# Patient Record
Sex: MALE | Race: WHITE | NOT HISPANIC OR LATINO | Employment: OTHER | URBAN - METROPOLITAN AREA
[De-identification: names, ages, dates, MRNs, and addresses within clinical notes are randomized per-mention and may not be internally consistent; named-entity substitution may affect disease eponyms.]

---

## 2023-04-14 ENCOUNTER — TELEPHONE (OUTPATIENT)
Dept: PRIMARY CARE | Facility: CLINIC | Age: 73
End: 2023-04-14
Payer: MEDICARE

## 2023-04-14 DIAGNOSIS — J43.2 CENTRILOBULAR EMPHYSEMA (MULTI): Primary | ICD-10-CM

## 2023-04-14 LAB
ALANINE AMINOTRANSFERASE (SGPT) (U/L) IN SER/PLAS: 12 U/L (ref 10–52)
ALBUMIN (G/DL) IN SER/PLAS: 3.9 G/DL (ref 3.4–5)
ALKALINE PHOSPHATASE (U/L) IN SER/PLAS: 66 U/L (ref 33–136)
ANION GAP IN SER/PLAS: 19 MMOL/L (ref 10–20)
ASPARTATE AMINOTRANSFERASE (SGOT) (U/L) IN SER/PLAS: 9 U/L (ref 9–39)
BASOPHILS (10*3/UL) IN BLOOD BY AUTOMATED COUNT: 0.05 X10E9/L (ref 0–0.1)
BASOPHILS/100 LEUKOCYTES IN BLOOD BY AUTOMATED COUNT: 0.6 % (ref 0–2)
BILIRUBIN TOTAL (MG/DL) IN SER/PLAS: 0.5 MG/DL (ref 0–1.2)
CALCIDIOL (25 OH VITAMIN D3) (NG/ML) IN SER/PLAS: 51 NG/ML
CALCIUM (MG/DL) IN SER/PLAS: 9 MG/DL (ref 8.6–10.3)
CARBON DIOXIDE, TOTAL (MMOL/L) IN SER/PLAS: 27 MMOL/L (ref 21–32)
CHLORIDE (MMOL/L) IN SER/PLAS: 104 MMOL/L (ref 98–107)
CHOLESTEROL (MG/DL) IN SER/PLAS: 112 MG/DL (ref 0–199)
CHOLESTEROL IN HDL (MG/DL) IN SER/PLAS: 26.6 MG/DL
CHOLESTEROL/HDL RATIO: 4.2
COBALAMIN (VITAMIN B12) (PG/ML) IN SER/PLAS: 1202 PG/ML (ref 211–911)
CREATININE (MG/DL) IN SER/PLAS: 12.03 MG/DL (ref 0.5–1.3)
EOSINOPHILS (10*3/UL) IN BLOOD BY AUTOMATED COUNT: 1.04 X10E9/L (ref 0–0.4)
EOSINOPHILS/100 LEUKOCYTES IN BLOOD BY AUTOMATED COUNT: 11.9 % (ref 0–6)
ERYTHROCYTE DISTRIBUTION WIDTH (RATIO) BY AUTOMATED COUNT: 16.7 % (ref 11.5–14.5)
ERYTHROCYTE MEAN CORPUSCULAR HEMOGLOBIN CONCENTRATION (G/DL) BY AUTOMATED: 30 G/DL (ref 32–36)
ERYTHROCYTE MEAN CORPUSCULAR VOLUME (FL) BY AUTOMATED COUNT: 102 FL (ref 80–100)
ERYTHROCYTES (10*6/UL) IN BLOOD BY AUTOMATED COUNT: 3.6 X10E12/L (ref 4.5–5.9)
GFR MALE: 4 ML/MIN/1.73M2
GLUCOSE (MG/DL) IN SER/PLAS: 88 MG/DL (ref 74–99)
HEMATOCRIT (%) IN BLOOD BY AUTOMATED COUNT: 36.7 % (ref 41–52)
HEMOGLOBIN (G/DL) IN BLOOD: 11 G/DL (ref 13.5–17.5)
IMMATURE GRANULOCYTES/100 LEUKOCYTES IN BLOOD BY AUTOMATED COUNT: 0.3 % (ref 0–0.9)
LDL: 53 MG/DL (ref 0–99)
LEUKOCYTES (10*3/UL) IN BLOOD BY AUTOMATED COUNT: 8.7 X10E9/L (ref 4.4–11.3)
LYMPHOCYTES (10*3/UL) IN BLOOD BY AUTOMATED COUNT: 1.23 X10E9/L (ref 0.8–3)
LYMPHOCYTES/100 LEUKOCYTES IN BLOOD BY AUTOMATED COUNT: 14.1 % (ref 13–44)
MONOCYTES (10*3/UL) IN BLOOD BY AUTOMATED COUNT: 0.86 X10E9/L (ref 0.05–0.8)
MONOCYTES/100 LEUKOCYTES IN BLOOD BY AUTOMATED COUNT: 9.9 % (ref 2–10)
NEUTROPHILS (10*3/UL) IN BLOOD BY AUTOMATED COUNT: 5.52 X10E9/L (ref 1.6–5.5)
NEUTROPHILS/100 LEUKOCYTES IN BLOOD BY AUTOMATED COUNT: 63.2 % (ref 40–80)
PLATELETS (10*3/UL) IN BLOOD AUTOMATED COUNT: 208 X10E9/L (ref 150–450)
POTASSIUM (MMOL/L) IN SER/PLAS: 3.9 MMOL/L (ref 3.5–5.3)
PROTEIN TOTAL: 6.6 G/DL (ref 6.4–8.2)
SODIUM (MMOL/L) IN SER/PLAS: 146 MMOL/L (ref 136–145)
THYROTROPIN (MIU/L) IN SER/PLAS BY DETECTION LIMIT <= 0.05 MIU/L: 1.58 MIU/L (ref 0.44–3.98)
THYROXINE (T4) FREE (NG/DL) IN SER/PLAS: 1.08 NG/DL (ref 0.61–1.12)
TRIGLYCERIDE (MG/DL) IN SER/PLAS: 161 MG/DL (ref 0–149)
UREA NITROGEN (MG/DL) IN SER/PLAS: 79 MG/DL (ref 6–23)
VLDL: 32 MG/DL (ref 0–40)

## 2023-04-17 PROBLEM — B35.1 ONYCHOMYCOSIS OF TOENAIL: Status: ACTIVE | Noted: 2023-04-17

## 2023-04-17 PROBLEM — I10 HYPERTENSION: Status: ACTIVE | Noted: 2023-04-17

## 2023-04-17 PROBLEM — J33.9 NASAL POLYPS: Status: ACTIVE | Noted: 2023-04-17

## 2023-04-17 PROBLEM — F17.200 NEEDS SMOKING CESSATION EDUCATION: Status: ACTIVE | Noted: 2023-04-17

## 2023-04-17 PROBLEM — I70.0 ATHEROSCLEROSIS OF AORTA (CMS-HCC): Status: ACTIVE | Noted: 2023-04-17

## 2023-04-17 PROBLEM — E05.00 GOITER WITH HYPERTHYROIDISM: Status: ACTIVE | Noted: 2023-04-17

## 2023-04-17 PROBLEM — I65.22 STENOSIS OF LEFT EXTERNAL CAROTID ARTERY: Status: ACTIVE | Noted: 2023-04-17

## 2023-04-17 PROBLEM — L29.8 UREMIC PRURITUS: Status: ACTIVE | Noted: 2023-04-17

## 2023-04-17 PROBLEM — E78.5 DYSLIPIDEMIA, GOAL LDL BELOW 70: Status: ACTIVE | Noted: 2023-04-17

## 2023-04-17 PROBLEM — J96.11 CHRONIC RESPIRATORY FAILURE WITH HYPOXIA (MULTI): Status: ACTIVE | Noted: 2023-04-17

## 2023-04-17 PROBLEM — G45.3 AMAUROSIS FUGAX: Status: ACTIVE | Noted: 2023-04-17

## 2023-04-17 PROBLEM — J30.2 PERENNIAL ALLERGIC RHINITIS WITH SEASONAL VARIATION: Status: ACTIVE | Noted: 2023-04-17

## 2023-04-17 PROBLEM — J43.2 CENTRILOBULAR EMPHYSEMA (MULTI): Status: ACTIVE | Noted: 2023-04-17

## 2023-04-17 PROBLEM — D51.3 OTHER DIETARY VITAMIN B12 DEFICIENCY ANEMIA: Status: ACTIVE | Noted: 2023-04-17

## 2023-04-17 PROBLEM — N18.6 END STAGE RENAL DISEASE (MULTI): Status: ACTIVE | Noted: 2023-04-17

## 2023-04-17 PROBLEM — I48.92 ATRIAL FLUTTER, PAROXYSMAL (MULTI): Status: ACTIVE | Noted: 2023-04-17

## 2023-04-17 PROBLEM — J30.89 PERENNIAL ALLERGIC RHINITIS WITH SEASONAL VARIATION: Status: ACTIVE | Noted: 2023-04-17

## 2023-04-17 PROBLEM — L24.9 IRRITANT DERMATITIS: Status: ACTIVE | Noted: 2023-04-17

## 2023-04-17 PROBLEM — J30.89 OTHER ALLERGIC RHINITIS: Status: ACTIVE | Noted: 2023-04-17

## 2023-04-17 PROBLEM — C44.319 BASAL CELL CARCINOMA (BCC) OF CHEEK: Status: ACTIVE | Noted: 2023-04-17

## 2023-04-17 PROBLEM — L29.89 UREMIC PRURITUS: Status: ACTIVE | Noted: 2023-04-17

## 2023-04-17 PROBLEM — J32.8 OTHER CHRONIC SINUSITIS: Status: ACTIVE | Noted: 2023-04-17

## 2023-04-17 PROBLEM — J44.9 COPD, SEVERE (MULTI): Status: ACTIVE | Noted: 2023-04-17

## 2023-04-17 PROBLEM — E55.9 VITAMIN D DEFICIENCY: Status: ACTIVE | Noted: 2023-04-17

## 2023-04-17 PROBLEM — E04.9 RETROSTERNAL THYROID GOITER: Status: ACTIVE | Noted: 2023-04-17

## 2023-04-17 PROBLEM — L85.3 XEROSIS OF SKIN: Status: ACTIVE | Noted: 2023-04-17

## 2023-04-17 RX ORDER — CALCIUM ACETATE 667 MG/1
CAPSULE ORAL
COMMUNITY
End: 2023-12-06 | Stop reason: WASHOUT

## 2023-04-17 RX ORDER — METOPROLOL TARTRATE 50 MG/1
1 TABLET ORAL 2 TIMES DAILY
COMMUNITY
Start: 2020-01-23 | End: 2023-10-04 | Stop reason: ENTERED-IN-ERROR

## 2023-04-17 RX ORDER — AMIODARONE HYDROCHLORIDE 200 MG/1
1 TABLET ORAL DAILY
COMMUNITY
End: 2023-04-25

## 2023-04-17 RX ORDER — FLUTICASONE FUROATE, UMECLIDINIUM BROMIDE AND VILANTEROL TRIFENATATE 100; 62.5; 25 UG/1; UG/1; UG/1
POWDER RESPIRATORY (INHALATION)
COMMUNITY
Start: 2020-01-23 | End: 2023-04-17 | Stop reason: SDUPTHER

## 2023-04-17 RX ORDER — TRIAMCINOLONE ACETONIDE 1 MG/G
CREAM TOPICAL 2 TIMES DAILY
COMMUNITY
Start: 2021-04-07 | End: 2023-04-25

## 2023-04-17 RX ORDER — APIXABAN 5 MG/1
5 TABLET, FILM COATED ORAL 2 TIMES DAILY
COMMUNITY
End: 2024-04-29 | Stop reason: SDUPTHER

## 2023-04-17 RX ORDER — IPRATROPIUM BROMIDE AND ALBUTEROL SULFATE 2.5; .5 MG/3ML; MG/3ML
3 SOLUTION RESPIRATORY (INHALATION) EVERY 4 HOURS PRN
COMMUNITY
Start: 2020-05-27 | End: 2023-04-25

## 2023-04-17 RX ORDER — FAMOTIDINE 40 MG/1
1 TABLET, FILM COATED ORAL 2 TIMES DAILY
COMMUNITY
Start: 2023-02-02 | End: 2023-11-15 | Stop reason: SDUPTHER

## 2023-04-17 RX ORDER — MONTELUKAST SODIUM 10 MG/1
10 TABLET ORAL NIGHTLY
COMMUNITY
End: 2023-06-07 | Stop reason: SDUPTHER

## 2023-04-17 RX ORDER — FUROSEMIDE 80 MG/1
1 TABLET ORAL 2 TIMES DAILY
COMMUNITY
Start: 2022-08-15 | End: 2023-11-15 | Stop reason: WASHOUT

## 2023-04-17 RX ORDER — EMOLLIENT COMBINATION NO.32
EMULSION, EXTENDED RELEASE TOPICAL
COMMUNITY
Start: 2021-09-27 | End: 2023-04-25

## 2023-04-17 RX ORDER — ERGOCALCIFEROL 1.25 MG/1
1 CAPSULE ORAL
COMMUNITY
End: 2023-11-15 | Stop reason: SDUPTHER

## 2023-04-17 RX ORDER — LISINOPRIL 20 MG/1
1 TABLET ORAL DAILY
COMMUNITY
Start: 2023-02-08 | End: 2023-10-04 | Stop reason: SDUPTHER

## 2023-04-17 RX ORDER — FLUTICASONE FUROATE, UMECLIDINIUM BROMIDE AND VILANTEROL TRIFENATATE 100; 62.5; 25 UG/1; UG/1; UG/1
1 POWDER RESPIRATORY (INHALATION)
Qty: 1 EACH | Refills: 11 | Status: SHIPPED | OUTPATIENT
Start: 2023-04-17 | End: 2024-04-29 | Stop reason: SDUPTHER

## 2023-04-17 RX ORDER — ASPIRIN 81 MG/1
1 TABLET ORAL DAILY
COMMUNITY
Start: 2020-05-27 | End: 2023-04-25

## 2023-04-17 RX ORDER — CHOLECALCIFEROL (VITAMIN D3) 25 MCG
1 TABLET,CHEWABLE ORAL DAILY
COMMUNITY
End: 2023-10-11

## 2023-04-17 RX ORDER — VIT B COMP NO.3/FOLIC/C/BIOTIN 1 MG-60 MG
1 TABLET ORAL DAILY
COMMUNITY
Start: 2023-02-10

## 2023-04-17 RX ORDER — WARFARIN 2.5 MG/1
TABLET ORAL
COMMUNITY
Start: 2020-05-26 | End: 2023-04-25

## 2023-04-17 RX ORDER — ATORVASTATIN CALCIUM 40 MG/1
40 TABLET, FILM COATED ORAL NIGHTLY
COMMUNITY
End: 2023-09-19

## 2023-04-26 ENCOUNTER — APPOINTMENT (OUTPATIENT)
Dept: PRIMARY CARE | Facility: CLINIC | Age: 73
End: 2023-04-26
Payer: MEDICARE

## 2023-06-07 DIAGNOSIS — J30.2 PERENNIAL ALLERGIC RHINITIS WITH SEASONAL VARIATION: Primary | ICD-10-CM

## 2023-06-07 DIAGNOSIS — J30.89 PERENNIAL ALLERGIC RHINITIS WITH SEASONAL VARIATION: Primary | ICD-10-CM

## 2023-06-07 RX ORDER — MONTELUKAST SODIUM 10 MG/1
10 TABLET ORAL NIGHTLY
Qty: 90 TABLET | Refills: 2 | Status: SHIPPED | OUTPATIENT
Start: 2023-06-07 | End: 2024-04-08

## 2023-06-26 PROBLEM — R33.8 ACUTE URINARY RETENTION: Status: ACTIVE | Noted: 2023-06-26

## 2023-06-26 PROBLEM — T83.018A: Status: ACTIVE | Noted: 2023-06-26

## 2023-06-26 RX ORDER — TRIAMCINOLONE ACETONIDE 1 MG/G
CREAM TOPICAL 2 TIMES DAILY
COMMUNITY
Start: 2021-04-07 | End: 2023-11-15 | Stop reason: WASHOUT

## 2023-06-26 RX ORDER — METHIMAZOLE 5 MG/1
5 TABLET ORAL
COMMUNITY
Start: 2016-11-02 | End: 2023-11-15 | Stop reason: SDUPTHER

## 2023-06-28 ENCOUNTER — APPOINTMENT (OUTPATIENT)
Dept: PRIMARY CARE | Facility: CLINIC | Age: 73
End: 2023-06-28
Payer: MEDICARE

## 2023-06-29 LAB
GRAM STAIN: NORMAL
TISSUE/WOUND CULTURE/SMEAR: NORMAL

## 2023-07-05 ENCOUNTER — PATIENT OUTREACH (OUTPATIENT)
Dept: PRIMARY CARE | Facility: CLINIC | Age: 73
End: 2023-07-05
Payer: MEDICARE

## 2023-07-05 NOTE — PROGRESS NOTES
Discharge Facility: [facility]  Discharge Diagnosis: Sepsis  Admission Date: 6-23-23  Discharge Date: 7-3-23    PCP Appointment Date: Pt. Declined apt. At this time. Pt. At dialysis   Specialist Appointment Date: Unknown   Hospital Encounter and Summary: Linked   See discharge assessment below for further details  Engagement  Call Start Time: 0921 (7/5/2023  9:23 AM)    Medications  Medications reviewed with patient/caregiver?: Yes (7/5/2023  9:23 AM)  Is the patient having any side effects they believe may be caused by any medication additions or changes?: No (7/5/2023  9:23 AM)  Does the patient have all medications ordered at discharge?: Yes (7/5/2023  9:23 AM)  Care Management Interventions: No intervention needed (7/5/2023  9:23 AM)  Is the patient taking all medications as directed (includes completed medication regime)?: Yes (7/5/2023  9:23 AM)  Care Management Interventions: Provided patient education (7/5/2023  9:23 AM)    Appointments  Does the patient have a primary care provider?: Yes (7/5/2023  9:23 AM)  Care Management Interventions: Educated patient on importance of making appointment (7/5/2023  9:23 AM)  Has the patient kept scheduled appointments due by today?: Yes (7/5/2023  9:23 AM)    Self Management  Has home health visited the patient within 72 hours of discharge?: Not applicable (7/5/2023  9:23 AM)    Patient Teaching  Does the patient have access to their discharge instructions?: Yes (7/5/2023  9:23 AM)  Care Management Interventions: Reviewed instructions with patient (7/5/2023  9:23 AM)  What is the patient's perception of their health status since discharge?: Improving (7/5/2023  9:23 AM)  Is the patient/caregiver able to teach back the hierarchy of who to call/visit for symptoms/problems? PCP, Specialist, Home Health nurse, Urgent Care, ED, 911: Yes (7/5/2023  9:23 AM)    Kaylie Dhillon LPN

## 2023-08-09 ENCOUNTER — TELEPHONE (OUTPATIENT)
Dept: PRIMARY CARE | Facility: CLINIC | Age: 73
End: 2023-08-09
Payer: MEDICARE

## 2023-08-09 ENCOUNTER — PATIENT OUTREACH (OUTPATIENT)
Dept: PRIMARY CARE | Facility: CLINIC | Age: 73
End: 2023-08-09
Payer: MEDICARE

## 2023-08-09 RX ORDER — LEVOFLOXACIN 500 MG/1
500 TABLET, FILM COATED ORAL
Qty: 3 TABLET | Refills: 0 | COMMUNITY
Start: 2023-08-08 | End: 2023-08-15 | Stop reason: ALTCHOICE

## 2023-08-09 RX ORDER — FLUTICASONE PROPIONATE 50 MCG
SPRAY, SUSPENSION (ML) NASAL
COMMUNITY
Start: 2023-08-03 | End: 2023-08-15 | Stop reason: ALTCHOICE

## 2023-08-09 RX ORDER — CEFEPIME HYDROCHLORIDE 1 G/1
INJECTION, POWDER, FOR SOLUTION INTRAMUSCULAR; INTRAVENOUS
COMMUNITY
Start: 2023-08-03 | End: 2023-12-06 | Stop reason: WASHOUT

## 2023-08-09 NOTE — PROGRESS NOTES
Discharge Facility: [facility]  Discharge Diagnosis: Dysphagia   Admission Date: 8-5-23  Discharge Date: 8-8-23    PCP Appointment Date:  TCM complete. Patient is NOT scheduled for a hospital follow up due to no available appointments, task to office.  Patient discharged (8-8-23).  In order to bill as a TCM, the patient needs to be seen by (8-22-23).    Moderately complex & follow-up within 14 days- bill [id number] for hospital follow up.   Highly complex and follow-up visit within 7 days-can bill [id number] for hospital follow up    *virtual follow up needs modifier added (95 or GT)   *AWV AND TCM CAN BE BILLED TOGETHER WITH 25 MODIFIER    Specialist Appointment Date:   PCP Requested Referral   Follow-Up Appointment   When: In 2 weeks   Patient/Parents to call for appointment?: Yes   Bo Palencia MD   [phone] [address] [facility] [address]   Hospital Encounter and Summary: not available at this time   See discharge assessment below for further details  Engagement  Call Start Time: 0931 (8/9/2023  9:35 AM)    Medications  Medications reviewed with patient/caregiver?: Yes (8/9/2023  9:35 AM)  Is the patient having any side effects they believe may be caused by any medication additions or changes?: No (8/9/2023  9:35 AM)  Does the patient have all medications ordered at discharge?: Yes (8/9/2023  9:35 AM)  Care Management Interventions: No intervention needed (8/9/2023  9:35 AM)  Is the patient taking all medications as directed (includes completed medication regime)?: Yes (8/9/2023  9:35 AM)  Care Management Interventions: Provided patient education (8/9/2023  9:35 AM)    Appointments  Does the patient have a primary care provider?: Yes (8/9/2023  9:35 AM)  Care Management Interventions: Educated patient on importance of making appointment (8/9/2023  9:35 AM)  Has the patient kept scheduled appointments due by today?: Not applicable (8/9/2023  9:35 AM)    Self Management  Has home health visited  the patient within 72 hours of discharge?: Not applicable (8/9/2023  9:35 AM)    Patient Teaching  Does the patient have access to their discharge instructions?: Yes (8/9/2023  9:35 AM)  Care Management Interventions: Reviewed instructions with patient (8/9/2023  9:35 AM)  What is the patient's perception of their health status since discharge?: Improving (8/9/2023  9:35 AM)  Is the patient/caregiver able to teach back the hierarchy of who to call/visit for symptoms/problems? PCP, Specialist, Home Health nurse, Urgent Care, ED, 911: Yes (8/9/2023  9:35 AM)    Kaylie Dhillon LPN

## 2023-08-15 ENCOUNTER — OFFICE VISIT (OUTPATIENT)
Dept: PRIMARY CARE | Facility: CLINIC | Age: 73
End: 2023-08-15
Payer: MEDICARE

## 2023-08-15 VITALS
SYSTOLIC BLOOD PRESSURE: 108 MMHG | WEIGHT: 171 LBS | DIASTOLIC BLOOD PRESSURE: 60 MMHG | HEIGHT: 69 IN | BODY MASS INDEX: 25.33 KG/M2 | HEART RATE: 72 BPM

## 2023-08-15 DIAGNOSIS — E05.00 GOITER WITH HYPERTHYROIDISM: ICD-10-CM

## 2023-08-15 DIAGNOSIS — I70.0 ATHEROSCLEROSIS OF AORTA (CMS-HCC): ICD-10-CM

## 2023-08-15 DIAGNOSIS — N18.6 END STAGE RENAL DISEASE (MULTI): Primary | ICD-10-CM

## 2023-08-15 DIAGNOSIS — J44.9 COPD, SEVERE (MULTI): ICD-10-CM

## 2023-08-15 DIAGNOSIS — J43.2 CENTRILOBULAR EMPHYSEMA (MULTI): ICD-10-CM

## 2023-08-15 DIAGNOSIS — J96.11 CHRONIC RESPIRATORY FAILURE WITH HYPOXIA (MULTI): ICD-10-CM

## 2023-08-15 DIAGNOSIS — E55.9 VITAMIN D DEFICIENCY: ICD-10-CM

## 2023-08-15 PROCEDURE — 1159F MED LIST DOCD IN RCRD: CPT | Performed by: INTERNAL MEDICINE

## 2023-08-15 PROCEDURE — 99496 TRANSJ CARE MGMT HIGH F2F 7D: CPT | Performed by: INTERNAL MEDICINE

## 2023-08-15 PROCEDURE — 3074F SYST BP LT 130 MM HG: CPT | Performed by: INTERNAL MEDICINE

## 2023-08-15 PROCEDURE — 1036F TOBACCO NON-USER: CPT | Performed by: INTERNAL MEDICINE

## 2023-08-15 PROCEDURE — 3078F DIAST BP <80 MM HG: CPT | Performed by: INTERNAL MEDICINE

## 2023-08-15 PROCEDURE — 1160F RVW MEDS BY RX/DR IN RCRD: CPT | Performed by: INTERNAL MEDICINE

## 2023-08-15 ASSESSMENT — ENCOUNTER SYMPTOMS
OCCASIONAL FEELINGS OF UNSTEADINESS: 0
LOSS OF SENSATION IN FEET: 0
DEPRESSION: 0

## 2023-08-15 ASSESSMENT — ANXIETY QUESTIONNAIRES
6. BECOMING EASILY ANNOYED OR IRRITABLE: NOT AT ALL
GAD7 TOTAL SCORE: 0
IF YOU CHECKED OFF ANY PROBLEMS ON THIS QUESTIONNAIRE, HOW DIFFICULT HAVE THESE PROBLEMS MADE IT FOR YOU TO DO YOUR WORK, TAKE CARE OF THINGS AT HOME, OR GET ALONG WITH OTHER PEOPLE: NOT DIFFICULT AT ALL
3. WORRYING TOO MUCH ABOUT DIFFERENT THINGS: NOT AT ALL
7. FEELING AFRAID AS IF SOMETHING AWFUL MIGHT HAPPEN: NOT AT ALL
1. FEELING NERVOUS, ANXIOUS, OR ON EDGE: NOT AT ALL
5. BEING SO RESTLESS THAT IT IS HARD TO SIT STILL: NOT AT ALL
4. TROUBLE RELAXING: NOT AT ALL
2. NOT BEING ABLE TO STOP OR CONTROL WORRYING: NOT AT ALL

## 2023-08-15 ASSESSMENT — PATIENT HEALTH QUESTIONNAIRE - PHQ9
1. LITTLE INTEREST OR PLEASURE IN DOING THINGS: NOT AT ALL
2. FEELING DOWN, DEPRESSED OR HOPELESS: NOT AT ALL
SUM OF ALL RESPONSES TO PHQ9 QUESTIONS 1 AND 2: 0

## 2023-08-15 NOTE — PROGRESS NOTES
"Patient: Renard Ward  : 1950  PCP: Leandro Bassett DO  MRN: [id number]  Program: No linked episodes     Renard Ward is a 73 y.o. male presenting today for follow-up after being discharged from the hospital 6 days ago. The main problem requiring admission was aspiration pneumonia . The discharge summary and/or Transitional Care Management documentation was reviewed. Medication reconciliation was performed as indicated via the \"Rafy as Reviewed\" timestamp.     Renard Ward was contacted by Transitional Care Management services two days after his discharge. This encounter and supporting documentation was reviewed.    The complexity of medical decision making for this patient's transitional care is high.    Physical Exam  Vitals and nursing note reviewed.   Constitutional:       General: He is not in acute distress.     Appearance: Normal appearance. He is well-developed. He is not toxic-appearing.   HENT:      Head: Normocephalic and atraumatic.      Right Ear: Tympanic membrane and external ear normal.      Left Ear: Tympanic membrane and external ear normal.      Nose: Nose normal.      Mouth/Throat:      Mouth: Mucous membranes are moist.      Pharynx: Oropharynx is clear. No oropharyngeal exudate or posterior oropharyngeal erythema.      Tonsils: No tonsillar exudate. 2+ on the right. 2+ on the left.   Eyes:      Extraocular Movements: Extraocular movements intact.      Conjunctiva/sclera: Conjunctivae normal.   Cardiovascular:      Rate and Rhythm: Normal rate and regular rhythm.      Pulses: Normal pulses.      Heart sounds: Normal heart sounds. No murmur heard.  Pulmonary:      Effort: Pulmonary effort is normal.      Breath sounds: Normal breath sounds.   Abdominal:      General: Abdomen is flat. Bowel sounds are normal.      Palpations: Abdomen is soft.   Musculoskeletal:      Cervical back: Neck supple.   Feet:      Right foot:      Skin integrity: Skin integrity normal. No ulcer, " blister, skin breakdown, erythema, warmth or callus.      Toenail Condition: Right toenails are normal.      Left foot:      Skin integrity: Skin integrity normal. No ulcer, blister, skin breakdown, erythema, warmth or callus.      Toenail Condition: Left toenails are normal.   Lymphadenopathy:      Cervical: No cervical adenopathy.   Skin:     General: Skin is warm and dry.      Capillary Refill: Capillary refill takes more than 3 seconds.      Findings: No rash.   Neurological:      Mental Status: He is alert. Mental status is at baseline.      Sensory: Sensation is intact.   Psychiatric:         Mood and Affect: Mood normal.         Behavior: Behavior normal.         Thought Content: Thought content normal.         Judgment: Judgment normal.         Assessment/Plan   Problem List Items Addressed This Visit       Atherosclerosis of aorta (CMS/HCC)    Centrilobular emphysema (CMS/HCC)    Chronic respiratory failure with hypoxia (CMS/HCC) - Primary    COPD, severe (CMS/HCC)    End stage renal disease (CMS/HCC)    Goiter with hyperthyroidism    Vitamin D deficiency       Review of Systems    Family History   Family history unknown: Yes       Engagement  Call Start Time: 0931 (8/9/2023  9:35 AM)    Medications  Medications reviewed with patient/caregiver?: Yes (8/9/2023  9:35 AM)  Is the patient having any side effects they believe may be caused by any medication additions or changes?: No (8/9/2023  9:35 AM)  Does the patient have all medications ordered at discharge?: Yes (8/9/2023  9:35 AM)  Care Management Interventions: No intervention needed (8/9/2023  9:35 AM)  Is the patient taking all medications as directed (includes completed medication regime)?: Yes (8/9/2023  9:35 AM)  Care Management Interventions: Provided patient education (8/9/2023  9:35 AM)    Appointments  Does the patient have a primary care provider?: Yes (8/9/2023  9:35 AM)  Care Management Interventions: Educated patient on importance of making  appointment (8/9/2023  9:35 AM)  Has the patient kept scheduled appointments due by today?: Not applicable (8/9/2023  9:35 AM)    Self Management  Has home health visited the patient within 72 hours of discharge?: Not applicable (8/9/2023  9:35 AM)    Patient Teaching  Does the patient have access to their discharge instructions?: Yes (8/9/2023  9:35 AM)  Care Management Interventions: Reviewed instructions with patient (8/9/2023  9:35 AM)  What is the patient's perception of their health status since discharge?: Improving (8/9/2023  9:35 AM)  Is the patient/caregiver able to teach back the hierarchy of who to call/visit for symptoms/problems? PCP, Specialist, Home Health nurse, Urgent Care, ED, 911: Yes (8/9/2023  9:35 AM)        No follow-ups on file.

## 2023-08-15 NOTE — PROGRESS NOTES
"Subjective   Patient ID: Renard Ward is a 73 y.o. male who presents for [facility] Follow up and Pneumonia.    HPI     Review of Systems    Objective   /60 (BP Location: Left arm, Patient Position: Sitting)   Pulse 72   Ht 1.753 m (5' 9\")   Wt 77.6 kg (171 lb)   BMI 25.25 kg/m²     Physical Exam    Assessment/Plan          "

## 2023-08-23 ENCOUNTER — PATIENT OUTREACH (OUTPATIENT)
Dept: PRIMARY CARE | Facility: CLINIC | Age: 73
End: 2023-08-23
Payer: MEDICARE

## 2023-08-23 NOTE — PROGRESS NOTES
Call regarding appt. with PCP on (8-15-23) after hospitalization.  At time of outreach call the patient feels as if their condition has (improved) since last visit.  Reviewed the PCP appointment with the pt and addressed any questions or concerns.   Kaylie Dhillon LPN

## 2023-09-05 ENCOUNTER — PATIENT OUTREACH (OUTPATIENT)
Dept: PRIMARY CARE | Facility: CLINIC | Age: 73
End: 2023-09-05
Payer: MEDICARE

## 2023-09-05 NOTE — PROGRESS NOTES
Discharge Facility:  [place]   Discharge Diagnosis: Hypokalemia  Admission Date: 8-29-23  Discharge Date: 9-2-23     PCP Appointment Date:  Specialist Appointment Date:   Hospital Encounter and Summary: Linked   See discharge assessment below for further details

## 2023-09-19 DIAGNOSIS — E78.5 DYSLIPIDEMIA, GOAL LDL BELOW 70: ICD-10-CM

## 2023-09-19 RX ORDER — ATORVASTATIN CALCIUM 40 MG/1
40 TABLET, FILM COATED ORAL NIGHTLY
Qty: 90 TABLET | Refills: 0 | Status: SHIPPED | OUTPATIENT
Start: 2023-09-19 | End: 2023-11-15 | Stop reason: SDUPTHER

## 2023-09-21 PROBLEM — I95.9 HYPOTENSION: Status: ACTIVE | Noted: 2023-09-21

## 2023-09-21 PROBLEM — I31.39 PERICARDIAL EFFUSION (HHS-HCC): Status: ACTIVE | Noted: 2023-06-08

## 2023-09-21 PROBLEM — J96.11 CHRONIC RESPIRATORY FAILURE WITH HYPOXIA (MULTI): Status: ACTIVE | Noted: 2023-09-21

## 2023-09-21 PROBLEM — H72.91 TYMPANIC MEMBRANE PERFORATION, RIGHT: Status: ACTIVE | Noted: 2023-08-06

## 2023-09-21 PROBLEM — J34.3 HYPERTROPHY OF NASAL TURBINATES: Status: ACTIVE | Noted: 2023-09-21

## 2023-09-21 PROBLEM — D72.829 LEUKOCYTOSIS: Status: ACTIVE | Noted: 2023-09-21

## 2023-09-21 PROBLEM — J96.21 ACUTE AND CHRONIC RESPIRATORY FAILURE WITH HYPOXIA (MULTI): Status: ACTIVE | Noted: 2020-05-27

## 2023-09-21 PROBLEM — E87.6 HYPOKALEMIA: Status: ACTIVE | Noted: 2023-09-21

## 2023-09-21 PROBLEM — Z99.2 PERITONEAL DIALYSIS CATHETER IN PLACE (CMS-HCC): Status: ACTIVE | Noted: 2020-09-24

## 2023-09-21 PROBLEM — R00.0 TACHYCARDIA: Status: ACTIVE | Noted: 2023-09-21

## 2023-09-21 PROBLEM — E83.51 HYPOCALCEMIA: Status: ACTIVE | Noted: 2023-09-21

## 2023-09-21 PROBLEM — E04.8 OTHER SPECIFIED NONTOXIC GOITER: Status: ACTIVE | Noted: 2020-05-27

## 2023-09-21 PROBLEM — N18.6 ESRD (END STAGE RENAL DISEASE) (MULTI): Status: ACTIVE | Noted: 2020-05-27

## 2023-09-21 PROBLEM — A41.9 SEPSIS (MULTI): Status: ACTIVE | Noted: 2023-09-21

## 2023-09-21 PROBLEM — I10 HTN (HYPERTENSION): Status: ACTIVE | Noted: 2020-05-27

## 2023-09-21 PROBLEM — R65.10 SIRS (SYSTEMIC INFLAMMATORY RESPONSE SYNDROME) (MULTI): Status: ACTIVE | Noted: 2023-09-21

## 2023-09-21 PROBLEM — N18.6 ANEMIA IN ESRD (END-STAGE RENAL DISEASE) (MULTI): Status: ACTIVE | Noted: 2023-09-21

## 2023-09-21 PROBLEM — R33.9 URINARY RETENTION: Status: ACTIVE | Noted: 2023-06-26

## 2023-09-21 PROBLEM — I48.92 ATRIAL FLUTTER (MULTI): Status: ACTIVE | Noted: 2023-06-06

## 2023-09-21 PROBLEM — E78.5 DYSLIPIDEMIA, GOAL LDL BELOW 70: Status: ACTIVE | Noted: 2023-09-21

## 2023-09-21 PROBLEM — D63.1 ANEMIA IN ESRD (END-STAGE RENAL DISEASE) (MULTI): Status: ACTIVE | Noted: 2023-09-21

## 2023-09-21 PROBLEM — J32.0 CHRONIC MAXILLARY SINUSITIS: Status: ACTIVE | Noted: 2023-04-17

## 2023-09-21 PROBLEM — J44.9 COPD (CHRONIC OBSTRUCTIVE PULMONARY DISEASE) (MULTI): Status: ACTIVE | Noted: 2020-05-27

## 2023-09-21 PROBLEM — J38.01 PARALYSIS OF RIGHT VOCAL CORD: Status: ACTIVE | Noted: 2023-08-06

## 2023-09-21 PROBLEM — E05.90 THYROTOXICOSIS, UNSPECIFIED WITHOUT THYROTOXIC CRISIS OR STORM: Status: ACTIVE | Noted: 2020-05-27

## 2023-09-21 PROBLEM — E05.90 HYPERTHYROIDISM: Status: ACTIVE | Noted: 2023-04-17

## 2023-09-21 PROBLEM — G45.9 TIA (TRANSIENT ISCHEMIC ATTACK): Status: ACTIVE | Noted: 2023-06-08

## 2023-09-21 LAB
ANION GAP IN SER/PLAS: 13 MMOL/L (ref 10–20)
CALCIUM (MG/DL) IN SER/PLAS: 8.8 MG/DL (ref 8.6–10.3)
CARBON DIOXIDE, TOTAL (MMOL/L) IN SER/PLAS: 32 MMOL/L (ref 21–32)
CHLORIDE (MMOL/L) IN SER/PLAS: 100 MMOL/L (ref 98–107)
CREATININE (MG/DL) IN SER/PLAS: 6.25 MG/DL (ref 0.5–1.3)
ERYTHROCYTE DISTRIBUTION WIDTH (RATIO) BY AUTOMATED COUNT: 15.8 % (ref 11.5–14.5)
ERYTHROCYTE MEAN CORPUSCULAR HEMOGLOBIN CONCENTRATION (G/DL) BY AUTOMATED: 30.4 G/DL (ref 32–36)
ERYTHROCYTE MEAN CORPUSCULAR VOLUME (FL) BY AUTOMATED COUNT: 96 FL (ref 80–100)
ERYTHROCYTES (10*6/UL) IN BLOOD BY AUTOMATED COUNT: 3.3 X10E12/L (ref 4.5–5.9)
GFR MALE: 9 ML/MIN/1.73M2
GLUCOSE (MG/DL) IN SER/PLAS: 81 MG/DL (ref 74–99)
HEMATOCRIT (%) IN BLOOD BY AUTOMATED COUNT: 31.6 % (ref 41–52)
HEMOGLOBIN (G/DL) IN BLOOD: 9.6 G/DL (ref 13.5–17.5)
INR IN PPP BY COAGULATION ASSAY: 1.4 (ref 0.9–1.1)
LEUKOCYTES (10*3/UL) IN BLOOD BY AUTOMATED COUNT: 8.9 X10E9/L (ref 4.4–11.3)
PLATELETS (10*3/UL) IN BLOOD AUTOMATED COUNT: 211 X10E9/L (ref 150–450)
POTASSIUM (MMOL/L) IN SER/PLAS: 4.7 MMOL/L (ref 3.5–5.3)
PROTHROMBIN TIME (PT) IN PPP BY COAGULATION ASSAY: 15.7 SEC (ref 9.8–12.8)
SODIUM (MMOL/L) IN SER/PLAS: 140 MMOL/L (ref 136–145)
UREA NITROGEN (MG/DL) IN SER/PLAS: 27 MG/DL (ref 6–23)

## 2023-09-21 RX ORDER — LISINOPRIL 10 MG/1
10 TABLET ORAL DAILY
COMMUNITY
End: 2023-10-04 | Stop reason: DRUGHIGH

## 2023-09-21 RX ORDER — CARVEDILOL 6.25 MG/1
6.25 TABLET ORAL 2 TIMES DAILY
COMMUNITY
Start: 2023-08-23 | End: 2023-12-06 | Stop reason: WASHOUT

## 2023-09-21 RX ORDER — TAMSULOSIN HYDROCHLORIDE 0.4 MG/1
0.4 CAPSULE ORAL NIGHTLY
COMMUNITY
Start: 2023-08-16 | End: 2023-11-15 | Stop reason: WASHOUT

## 2023-09-21 RX ORDER — TORSEMIDE 20 MG/1
20 TABLET ORAL 2 TIMES DAILY
COMMUNITY
Start: 2023-09-02 | End: 2024-05-15 | Stop reason: SDUPTHER

## 2023-09-22 ENCOUNTER — HOSPITAL ENCOUNTER (OUTPATIENT)
Dept: DATA CONVERSION | Facility: HOSPITAL | Age: 73
End: 2023-09-22
Attending: INTERNAL MEDICINE | Admitting: INTERNAL MEDICINE
Payer: MEDICARE

## 2023-09-22 DIAGNOSIS — I48.92 UNSPECIFIED ATRIAL FLUTTER (MULTI): ICD-10-CM

## 2023-09-29 VITALS — WEIGHT: 170.19 LBS | HEIGHT: 69 IN | BODY MASS INDEX: 25.21 KG/M2

## 2023-09-30 NOTE — H&P
History & Physical Reviewed:   I have reviewed the History and Physical dated:  01-Sep-2023   History and Physical reviewed and relevant findings noted. Patient examined to review pertinent physical  findings.: No significant changes   Home Medications Reviewed: no changes noted   Allergies Reviewed: no changes noted       Airway/Sedation Assessment:  ·  Emotional Status calm   ·  Neurologic alert & oriented x 3   ·  Respiratory clear to auscultation   ·  Cardiovascular rhythm & rate regular  right upper chest HD cath   ·  GI/ soft, nontender     · Pulses present: Pedal Left, Pedal Right, Radial Left, Radial Right    AS [facility] phys assess pulse FT left pedal 1+, right pedal 2+, radial 2+ bilateral     ·  Mouth Opening OK yes   ·  Neck Flexibility OK yes   ·  Loose Teeth no   ·  Oropharyngeal Classification Class III   ·  ASA PS Classification ASA III   ·  Sedation Plan moderate sedation       ERAS (Enhanced Recovery After Surgery):  ·  ERAS Patient: no     Consent:   COVID-19 Consent:  ·  COVID-19 Risk Consent Surgeon has reviewed key risks related to the risk of danae COVID-19 and if they contract COVID-19 what the risks are.     Assessment/Plan:   ·  Assessment and Plan    GENEVA MCGEE is a 73 year old male who presents for aflutter RFCA with Dr. Vega      Electronic Signatures:  Loco Velasquez (JENNY, APRN-CNP)  (Signed 22-Sep-2023 09:23)   Authored: History & Physical Reviewed, Airway/Sedation,  ERAS, Consent, Assessment/Plan, Note Completion      Last Updated: 22-Sep-2023 09:23 by Loco Velasquez (JENNY, APRN-CNP)

## 2023-10-04 ENCOUNTER — OFFICE VISIT (OUTPATIENT)
Dept: CARDIOLOGY | Facility: CLINIC | Age: 73
End: 2023-10-04
Payer: MEDICARE

## 2023-10-04 VITALS
BODY MASS INDEX: 25.48 KG/M2 | SYSTOLIC BLOOD PRESSURE: 130 MMHG | WEIGHT: 172 LBS | HEIGHT: 69 IN | HEART RATE: 78 BPM | DIASTOLIC BLOOD PRESSURE: 62 MMHG

## 2023-10-04 DIAGNOSIS — I48.3 TYPICAL ATRIAL FLUTTER (MULTI): Primary | ICD-10-CM

## 2023-10-04 DIAGNOSIS — I48.92 ATRIAL FLUTTER, PAROXYSMAL (MULTI): ICD-10-CM

## 2023-10-04 PROCEDURE — 3078F DIAST BP <80 MM HG: CPT | Performed by: NURSE PRACTITIONER

## 2023-10-04 PROCEDURE — 1160F RVW MEDS BY RX/DR IN RCRD: CPT | Performed by: NURSE PRACTITIONER

## 2023-10-04 PROCEDURE — 1159F MED LIST DOCD IN RCRD: CPT | Performed by: NURSE PRACTITIONER

## 2023-10-04 PROCEDURE — 99214 OFFICE O/P EST MOD 30 MIN: CPT | Performed by: NURSE PRACTITIONER

## 2023-10-04 PROCEDURE — 3075F SYST BP GE 130 - 139MM HG: CPT | Performed by: NURSE PRACTITIONER

## 2023-10-04 PROCEDURE — 1036F TOBACCO NON-USER: CPT | Performed by: NURSE PRACTITIONER

## 2023-10-04 NOTE — PATIENT INSTRUCTIONS
Patient to continue current medications with no interruption of Eliquis for 90 days after his ablation. We will follow up in 2 months

## 2023-10-04 NOTE — PROGRESS NOTES
Referred  for   Chief Complaint   Patient presents with    Rapid Heart Rate    Atrial Flutter        Renard Ward is a 73 y.o. year old male patient with    1. ESRD- currently using PD. He does have left subclavian port as he needed HD when he was hospitalized over this summer.    2. TIA-possibly related to disruption of oral anticoagulants which has been resumed now    3. Atrial flutter: s/p multiple cardioversions. They usually would last ~5 years each but recurrent AFL in 2020 and had CTI ablation in 2020. Patient claims he has done well until he had recent hospital admission in early July 2023 for sepsis from UTI and then again in early August for pneumonia. He denies palpitations though has intermittent dizziness for the past month. Follow up with general cardiology showed atrial flutter. Last ECG in sinus rhythm was June 2023. LV function was reduced 45-50%. After reviewed ECGs AFL was different than his prior AFL that was ablated, possible reverse typical atrial flutter now. He was agreeable to ablation as AAD therapy would be difficult due to CKD.     9/22/2023 Successful typical flutter ablation. During the EP study the arrhythmia appeared to typical flutter breaking through anterior aspect of prior CTI line    Patient here for follow up post ablation. He reports he has been feeling well. He had palpitations for a few hours the first or second day after his ablation though no further palpitations. He denies SOB or chest pain.       PMHx/PSHx: As above  Tobacco Quit 2020, prior 2 packs/day, Alcohol Denies, Caffeine use  Denies, Drug use  Denies  FamHx: Mother with valve replacement     Allergies:  Allergies   Allergen Reactions    Penicillins Anaphylaxis and Hives        Outpatient Medications:  Current Outpatient Medications   Medication Instructions    albuterol 90 mcg/actuation inhaler INHALE 2 PUFFS BY MOUTH FOUR TIMES A DAY AS NEEDED FOR SHORTNESS OF BREATH    atorvastatin (LIPITOR) 40 mg, oral,  "Nightly    calcium acetate (Phoslo) 667 mg capsule oral    carvedilol (COREG) 6.25 mg, oral, 2 times daily, (STOP METOPROLOL)    cefepime (Maxipime) 1 gram injection intravenous    cephalexin (Keflex) 500 mg capsule TAKE 1 CAPSULE BY MOUTH EVERY 12 HOURS FOR 14 DAYS. TAKE AFTER DIALYSIS AND DAYS OF DIALYSIS.    doxycycline (Vibramycin) 100 mg capsule TAKE 1 CAPSULE BY MOUTH EVERY 12 HOURS    Eliquis 5 mg, oral, 2 times daily    ergocalciferol (Vitamin D-2) 1.25 MG (03698 UT) capsule 1 capsule, oral    famotidine (Pepcid) 40 mg tablet 1 tablet, oral, 2 times daily    fluticasone-umeclidin-vilanter (Trelegy Ellipta) 100-62.5-25 mcg blister with device 1 puff, inhalation, Daily RT    furosemide (Lasix) 80 mg tablet 1 tablet, oral, 2 times daily    lisinopril 20 mg tablet 1 tablet, oral, Daily    lisinopril 10 mg, oral, Daily    methIMAzole (TAPAZOLE) 5 mg    montelukast (SINGULAIR) 10 mg, oral, Nightly    Mary Grace-Darryl Rx 1- mg-mg-mcg tablet 1 tablet, oral, Daily    tamsulosin (FLOMAX) 0.4 mg, oral, Nightly    torsemide (Demadex) 20 mg tablet TAKE 1 TABLET BY MOUTH TWO TIMES A DAY    torsemide (DEMADEX) 20 mg, oral, 2 times daily    triamcinolone (Kenalog) 0.1 % cream Topical, 2 times daily    Vitamin B-12 1,000 mcg tablet extended release 1 tablet, oral, Daily         Last Recorded Vitals:      10/19/2022     2:20 PM 1/20/2023     1:01 PM 6/15/2023     3:59 PM 8/15/2023    11:48 AM 8/15/2023    11:50 AM 9/22/2023    10:13 AM 10/4/2023    10:10 AM   Vitals   Systolic 132 149 128 108   130   Diastolic 60 77 62 60   62   Heart Rate 84 85 72 72   78   Temp  36.8 °C (98.3 °F)        Resp 18 18        Height (in) 1.753 m (5' 9\")  1.753 m (5' 9\") 1.753 m (5' 9\") 1.753 m (5' 9\") 1.752 m (5' 8.98\") 1.753 m (5' 9\")   Weight (lb) 211 207.5 197 171 171 170.2 172   BMI 31.16 kg/m2 30.64 kg/m2 29.09 kg/m2 25.25 kg/m2 25.25 kg/m2 25.15 kg/m2 25.4 kg/m2   BSA (m2) 2.16 m2 2.14 m2 2.09 m2 1.94 m2 1.94 m2 1.94 m2 1.95 m2   Visit " "Report    Report Report  Report    Visit Vitals  /62   Pulse 78   Ht 1.753 m (5' 9\")   Wt 78 kg (172 lb)   BMI 25.40 kg/m²   Smoking Status Former   BSA 1.95 m²        Physical Exam  Constitutional:       Appearance: Normal appearance.   Eyes:      Pupils: Pupils are equal, round, and reactive to light.   Cardiovascular:      Rate and Rhythm: Normal rate and regular rhythm.      Heart sounds: Normal heart sounds.   Pulmonary:      Effort: Pulmonary effort is normal.      Breath sounds: Normal breath sounds.   Musculoskeletal:         General: Normal range of motion.   Skin:     General: Skin is warm and dry.   Neurological:      Mental Status: He is alert and oriented to person, place, and time.           My Interpretation of Reviewed Study(s):  Echo(July/2020): LV function reduced of 45-50% EF   Prior ECGs (reviewed and my interpretation): ECG personally reviewed today showing sinus rhythm with heart rate of 78 bpm.    Lab Results   Component Value Date    WBC 8.9 09/21/2023    HGB 9.6 (L) 09/21/2023    HCT 31.6 (L) 09/21/2023     09/21/2023    ALT 13 08/29/2023    AST 12 08/29/2023     09/21/2023    K 4.7 09/21/2023     09/21/2023    CREATININE 6.25 (H) 09/21/2023    BUN 27 (H) 09/21/2023    CO2 32 09/21/2023    TSH 0.08 (L) 08/31/2023    INR 1.4 (H) 09/21/2023       Assessment  Patient maintaining sinus rhythm since his ablation last month. ECG today shows sinus rhythm. We will continue current medications. Also stressed the importance of continued Eliquis without interruption for 90 day post ablation to mitigate the increased risk of stroke. We will follow up in 2 months. Patient in agreement with plan.     PLAN  Continue carvedilol and Eliquis  Follow up in 2 months      Exclusive of any other services or procedures performed, Fidelia MOLINA APRN-CNP , spent 30 minutes in duration for this visit today.  This time consisted of chart review, obtaining history, and/or performing " the exam as documented above as well as documenting the clinical information for the encounter in the electronic record, discussing treatment options, plans, and/or goals with patient, family, and/or caregiver, refilling medications, updating the electronic record, ordering medicines, lab work, imaging, referrals, and/or procedures as documented above and communicating with other [facility] professionals. I have discussed the results of laboratory, radiology, and cardiology studies with the patient and their family/caregiver.

## 2023-10-11 DIAGNOSIS — D51.3 OTHER DIETARY VITAMIN B12 DEFICIENCY ANEMIA: ICD-10-CM

## 2023-10-11 RX ORDER — CHOLECALCIFEROL (VITAMIN D3) 25 MCG
1 TABLET,CHEWABLE ORAL DAILY
Qty: 90 TABLET | Refills: 0 | Status: SHIPPED | OUTPATIENT
Start: 2023-10-11 | End: 2023-11-15 | Stop reason: SDUPTHER

## 2023-10-20 ENCOUNTER — APPOINTMENT (OUTPATIENT)
Dept: PULMONOLOGY | Facility: HOSPITAL | Age: 73
End: 2023-10-20
Payer: MEDICARE

## 2023-11-10 DIAGNOSIS — Z23 FLU VACCINE NEED: ICD-10-CM

## 2023-11-15 ENCOUNTER — APPOINTMENT (OUTPATIENT)
Dept: PRIMARY CARE | Facility: CLINIC | Age: 73
End: 2023-11-15
Payer: MEDICARE

## 2023-11-15 ENCOUNTER — OFFICE VISIT (OUTPATIENT)
Dept: PRIMARY CARE | Facility: CLINIC | Age: 73
End: 2023-11-15
Payer: MEDICARE

## 2023-11-15 VITALS
HEART RATE: 68 BPM | SYSTOLIC BLOOD PRESSURE: 132 MMHG | BODY MASS INDEX: 26.36 KG/M2 | DIASTOLIC BLOOD PRESSURE: 70 MMHG | WEIGHT: 178 LBS | HEIGHT: 69 IN

## 2023-11-15 DIAGNOSIS — D63.1 ANEMIA IN ESRD (END-STAGE RENAL DISEASE) (MULTI): ICD-10-CM

## 2023-11-15 DIAGNOSIS — N18.6 ANEMIA IN ESRD (END-STAGE RENAL DISEASE) (MULTI): ICD-10-CM

## 2023-11-15 DIAGNOSIS — I48.92 ATRIAL FLUTTER, PAROXYSMAL (MULTI): ICD-10-CM

## 2023-11-15 DIAGNOSIS — N18.6 END STAGE RENAL DISEASE (MULTI): ICD-10-CM

## 2023-11-15 DIAGNOSIS — E78.5 DYSLIPIDEMIA, GOAL LDL BELOW 70: ICD-10-CM

## 2023-11-15 DIAGNOSIS — I70.0 ATHEROSCLEROSIS OF AORTA (CMS-HCC): ICD-10-CM

## 2023-11-15 DIAGNOSIS — N25.81 SECONDARY HYPERPARATHYROIDISM OF RENAL ORIGIN (MULTI): ICD-10-CM

## 2023-11-15 DIAGNOSIS — E05.00 GOITER WITH HYPERTHYROIDISM: ICD-10-CM

## 2023-11-15 DIAGNOSIS — E55.9 VITAMIN D DEFICIENCY: ICD-10-CM

## 2023-11-15 DIAGNOSIS — N18.6 ESRD (END STAGE RENAL DISEASE) (MULTI): ICD-10-CM

## 2023-11-15 DIAGNOSIS — J44.9 COPD, SEVERE (MULTI): ICD-10-CM

## 2023-11-15 DIAGNOSIS — D51.3 OTHER DIETARY VITAMIN B12 DEFICIENCY ANEMIA: ICD-10-CM

## 2023-11-15 DIAGNOSIS — Z00.00 MEDICARE ANNUAL WELLNESS VISIT, SUBSEQUENT: Primary | ICD-10-CM

## 2023-11-15 PROBLEM — E87.6 HYPOKALEMIA: Status: RESOLVED | Noted: 2023-09-21 | Resolved: 2023-11-15

## 2023-11-15 PROBLEM — I31.39 PERICARDIAL EFFUSION (HHS-HCC): Status: RESOLVED | Noted: 2023-06-08 | Resolved: 2023-11-15

## 2023-11-15 PROBLEM — E04.8 OTHER SPECIFIED NONTOXIC GOITER: Status: RESOLVED | Noted: 2020-05-27 | Resolved: 2023-11-15

## 2023-11-15 PROBLEM — R00.0 TACHYCARDIA: Status: RESOLVED | Noted: 2023-09-21 | Resolved: 2023-11-15

## 2023-11-15 PROBLEM — L29.89 UREMIC PRURITUS: Status: RESOLVED | Noted: 2023-04-17 | Resolved: 2023-11-15

## 2023-11-15 PROBLEM — J96.11 CHRONIC RESPIRATORY FAILURE WITH HYPOXIA (MULTI): Status: RESOLVED | Noted: 2023-09-21 | Resolved: 2023-11-15

## 2023-11-15 PROBLEM — L29.8 UREMIC PRURITUS: Status: RESOLVED | Noted: 2023-04-17 | Resolved: 2023-11-15

## 2023-11-15 PROBLEM — J96.21 ACUTE AND CHRONIC RESPIRATORY FAILURE WITH HYPOXIA (MULTI): Status: RESOLVED | Noted: 2020-05-27 | Resolved: 2023-11-15

## 2023-11-15 PROBLEM — A41.9 SEPSIS (MULTI): Status: RESOLVED | Noted: 2023-09-21 | Resolved: 2023-11-15

## 2023-11-15 PROBLEM — Z99.2 PERITONEAL DIALYSIS CATHETER IN PLACE (CMS-HCC): Status: RESOLVED | Noted: 2020-09-24 | Resolved: 2023-11-15

## 2023-11-15 PROBLEM — R33.9 URINARY RETENTION: Status: RESOLVED | Noted: 2023-06-26 | Resolved: 2023-11-15

## 2023-11-15 PROBLEM — E83.51 HYPOCALCEMIA: Status: RESOLVED | Noted: 2023-09-21 | Resolved: 2023-11-15

## 2023-11-15 PROBLEM — I10 HTN (HYPERTENSION): Status: RESOLVED | Noted: 2020-05-27 | Resolved: 2023-11-15

## 2023-11-15 PROBLEM — L24.9 IRRITANT DERMATITIS: Status: RESOLVED | Noted: 2023-04-17 | Resolved: 2023-11-15

## 2023-11-15 PROBLEM — E05.90 THYROTOXICOSIS, UNSPECIFIED WITHOUT THYROTOXIC CRISIS OR STORM: Status: RESOLVED | Noted: 2020-05-27 | Resolved: 2023-11-15

## 2023-11-15 PROBLEM — B35.1 ONYCHOMYCOSIS OF TOENAIL: Status: RESOLVED | Noted: 2023-04-17 | Resolved: 2023-11-15

## 2023-11-15 PROBLEM — T83.018A: Status: RESOLVED | Noted: 2023-06-26 | Resolved: 2023-11-15

## 2023-11-15 PROBLEM — I95.9 HYPOTENSION: Status: RESOLVED | Noted: 2023-09-21 | Resolved: 2023-11-15

## 2023-11-15 PROBLEM — J43.2 CENTRILOBULAR EMPHYSEMA (MULTI): Status: RESOLVED | Noted: 2023-04-17 | Resolved: 2023-11-15

## 2023-11-15 PROBLEM — R65.10 SIRS (SYSTEMIC INFLAMMATORY RESPONSE SYNDROME) (MULTI): Status: RESOLVED | Noted: 2023-09-21 | Resolved: 2023-11-15

## 2023-11-15 PROBLEM — D72.829 LEUKOCYTOSIS: Status: RESOLVED | Noted: 2023-09-21 | Resolved: 2023-11-15

## 2023-11-15 PROCEDURE — G0439 PPPS, SUBSEQ VISIT: HCPCS | Performed by: INTERNAL MEDICINE

## 2023-11-15 PROCEDURE — 1160F RVW MEDS BY RX/DR IN RCRD: CPT | Performed by: INTERNAL MEDICINE

## 2023-11-15 PROCEDURE — 99497 ADVNCD CARE PLAN 30 MIN: CPT | Performed by: INTERNAL MEDICINE

## 2023-11-15 PROCEDURE — 99397 PER PM REEVAL EST PAT 65+ YR: CPT | Performed by: INTERNAL MEDICINE

## 2023-11-15 PROCEDURE — G0446 INTENS BEHAVE THER CARDIO DX: HCPCS | Performed by: INTERNAL MEDICINE

## 2023-11-15 PROCEDURE — 99215 OFFICE O/P EST HI 40 MIN: CPT | Performed by: INTERNAL MEDICINE

## 2023-11-15 PROCEDURE — 1159F MED LIST DOCD IN RCRD: CPT | Performed by: INTERNAL MEDICINE

## 2023-11-15 PROCEDURE — G0444 DEPRESSION SCREEN ANNUAL: HCPCS | Performed by: INTERNAL MEDICINE

## 2023-11-15 PROCEDURE — 1170F FXNL STATUS ASSESSED: CPT | Performed by: INTERNAL MEDICINE

## 2023-11-15 PROCEDURE — 1036F TOBACCO NON-USER: CPT | Performed by: INTERNAL MEDICINE

## 2023-11-15 RX ORDER — METHIMAZOLE 5 MG/1
5 TABLET ORAL DAILY
Qty: 30 TABLET | Refills: 3 | Status: SHIPPED | OUTPATIENT
Start: 2023-11-15 | End: 2024-03-19 | Stop reason: SDUPTHER

## 2023-11-15 RX ORDER — ERGOCALCIFEROL 1.25 MG/1
1 CAPSULE ORAL
Qty: 12 CAPSULE | Refills: 3 | Status: SHIPPED | OUTPATIENT
Start: 2023-11-15 | End: 2024-04-04 | Stop reason: WASHOUT

## 2023-11-15 RX ORDER — FAMOTIDINE 40 MG/1
40 TABLET, FILM COATED ORAL 2 TIMES DAILY
Qty: 180 TABLET | Refills: 3 | Status: SHIPPED | OUTPATIENT
Start: 2023-11-15

## 2023-11-15 RX ORDER — ACETAMINOPHEN, DEXTROMETHORPHAN HBR, DOXYLAMINE SUCCINATE, PHENYLEPHRINE HCL 650; 20; 12.5; 1 MG/30ML; MG/30ML; MG/30ML; MG/30ML
1 SOLUTION ORAL DAILY
Qty: 90 TABLET | Refills: 0 | Status: SHIPPED | OUTPATIENT
Start: 2023-11-15 | End: 2024-04-29 | Stop reason: SDUPTHER

## 2023-11-15 RX ORDER — ATORVASTATIN CALCIUM 40 MG/1
40 TABLET, FILM COATED ORAL NIGHTLY
Qty: 90 TABLET | Refills: 0 | Status: SHIPPED | OUTPATIENT
Start: 2023-11-15 | End: 2023-12-06 | Stop reason: WASHOUT

## 2023-11-15 ASSESSMENT — ANXIETY QUESTIONNAIRES
5. BEING SO RESTLESS THAT IT IS HARD TO SIT STILL: NOT AT ALL
7. FEELING AFRAID AS IF SOMETHING AWFUL MIGHT HAPPEN: NOT AT ALL
1. FEELING NERVOUS, ANXIOUS, OR ON EDGE: NOT AT ALL
2. NOT BEING ABLE TO STOP OR CONTROL WORRYING: NOT AT ALL
IF YOU CHECKED OFF ANY PROBLEMS ON THIS QUESTIONNAIRE, HOW DIFFICULT HAVE THESE PROBLEMS MADE IT FOR YOU TO DO YOUR WORK, TAKE CARE OF THINGS AT HOME, OR GET ALONG WITH OTHER PEOPLE: NOT DIFFICULT AT ALL
6. BECOMING EASILY ANNOYED OR IRRITABLE: NOT AT ALL
3. WORRYING TOO MUCH ABOUT DIFFERENT THINGS: NOT AT ALL
4. TROUBLE RELAXING: NOT AT ALL
GAD7 TOTAL SCORE: 0

## 2023-11-15 ASSESSMENT — ACTIVITIES OF DAILY LIVING (ADL)
BATHING: INDEPENDENT
MANAGING_FINANCES: INDEPENDENT
TAKING_MEDICATION: INDEPENDENT
DRESSING: INDEPENDENT
GROCERY_SHOPPING: INDEPENDENT
DOING_HOUSEWORK: INDEPENDENT

## 2023-11-15 ASSESSMENT — ENCOUNTER SYMPTOMS
OCCASIONAL FEELINGS OF UNSTEADINESS: 0
LOSS OF SENSATION IN FEET: 0
DEPRESSION: 0

## 2023-11-15 NOTE — PROGRESS NOTES
"Depression and anxiety screening completed   PHQ9 score   GAD7 score   I spent 15 minutes obtaining and discussing depression screening using PHQ 2 questions with results documented in chart.  Screening using PHQ-9 and ALEXIA-7 scores were used for follow-up with treatment and referral plan discussed.      I spent 15 minutes face-to-face with this individual discussing the cardiovascular risk and behavioral therapies of nutritional choices exercise and elimination of habits contributing to risk .We agreed on a plan how they may be able to reduce  current cardiovascular risk.  Per patient with calculation greater than 10% aspirin use was discussed and encouraged unless known allergy or increased risk of bleeding contraindicates use patient's 10-year CV risk estimate calculates:I spent greater than 15 minutes discussing advance care planning including the explanation and discussion of advanced directives.  If patient does not have current up-to-date documents examples and information provided on how to create both living will and power of . [facility] toolkit was given to patient and was encouraged to work out completing these documents.Subjective   Reason for Visit: Renard Ward is an 73 y.o. male here for a Medicare Wellness visit.     Past Medical, Surgical, and Family History reviewed and updated in chart.         HPI    Patient Care Team:  Leandro Bassett DO as PCP - General  Leandro Bassett DO as PCP - [employer] PCP     Review of Systems    Objective   Vitals:  /70 (BP Location: Right arm, Patient Position: Sitting)   Pulse 68   Ht 1.753 m (5' 9\")   Wt 80.7 kg (178 lb)   BMI 26.29 kg/m²       Physical Exam    Assessment/Plan   Problem List Items Addressed This Visit       Atherosclerosis of aorta (CMS/HCC)    Relevant Medications    famotidine (Pepcid) 40 mg tablet    methIMAzole (Tapazole) 5 mg tablet    Other Relevant Orders    Tsh With Reflex To Free T4 If Abnormal    Lipid " Panel    PTH, intact    Vitamin D 25-Hydroxy,Total (for eval of Vitamin D levels)    Vitamin B12    Iron and TIBC    Follow Up In [facility] - PCP - Established    Atrial flutter, paroxysmal (CMS/HCC)    Current Assessment & Plan     Patient had cardio ablation completed on September 22, 2023 with good results current normal sinus rhythm continue with chronic anticoagulation with history of stroke on apixaban 5 mg twice a day consider reduction in dose in the setting of end-stage renal disease         ESRD (end stage renal disease) (CMS/HCC)    Overview     Last Assessment & Plan: Formatting of this note might be different from the original. Assessment: On peritoneal dialysis, reports compliance with treatments. Follows with local nephrologist.         Current Assessment & Plan     Patient currently on home hemodialysis and doing very well continue to monitor closely with nephrology         Relevant Medications    famotidine (Pepcid) 40 mg tablet    methIMAzole (Tapazole) 5 mg tablet    Other dietary vitamin B12 deficiency anemia    Current Assessment & Plan     Reevaluate B12 levels         Relevant Medications    cyanocobalamin, vitamin B-12, (Vitamin B-12) 1,000 mcg tablet extended release    famotidine (Pepcid) 40 mg tablet    methIMAzole (Tapazole) 5 mg tablet    Other Relevant Orders    Tsh With Reflex To Free T4 If Abnormal    Lipid Panel    PTH, intact    Vitamin D 25-Hydroxy,Total (for eval of Vitamin D levels)    Vitamin B12    Iron and TIBC    Follow Up In [facility] - PCP - Established    Vitamin D deficiency    Relevant Medications    ergocalciferol (Vitamin D-2) 1.25 MG (14871 UT) capsule    famotidine (Pepcid) 40 mg tablet    methIMAzole (Tapazole) 5 mg tablet    Other Relevant Orders    Tsh With Reflex To Free T4 If Abnormal    Lipid Panel    PTH, intact    Vitamin D 25-Hydroxy,Total (for eval of Vitamin D levels)    Vitamin B12    Iron and TIBC    Follow Up In [facility] - PCP - Established    Goiter with hyperthyroidism    Overview     >>OVERVIEW FOR HYPERTHYROIDISM WRITTEN ON 9/21/2023  4:09 AM BY LIZETT HARRINGTON    Last Assessment & Plan: Formatting of this note might be different from the original. Assessment: Continue methimazole (tapazole), denies any current thyroid symptoms. No results found for: TSH)         Current Assessment & Plan     Continue methimazole 5 mg daily reevaluate thyroid function         Relevant Medications    famotidine (Pepcid) 40 mg tablet    methIMAzole (Tapazole) 5 mg tablet    Other Relevant Orders    Tsh With Reflex To Free T4 If Abnormal    Lipid Panel    PTH, intact    Vitamin D 25-Hydroxy,Total (for eval of Vitamin D levels)    Vitamin B12    Iron and TIBC    Follow Up In [facility] - PCP - Established    Anemia in ESRD (end-stage renal disease) (CMS/formerly [facility])    Current Assessment & Plan     Could benefit from iron replacement therapy with hemoglobin of 9.6 consider Neupogen derivatives in the setting of dialysis and consider IV iron supplementation reevaluate with next blood work         Dyslipidemia, goal LDL below 70    Current Assessment & Plan     Reevaluate lipid profile with LDL goal less than 70 on atorvastatin 40 mg daily         Relevant Medications    atorvastatin (Lipitor) 40 mg tablet    famotidine (Pepcid) 40 mg tablet    methIMAzole (Tapazole) 5 mg tablet    Other Relevant Orders    Tsh With Reflex To Free T4 If Abnormal    Lipid Panel    PTH, intact    Vitamin D 25-Hydroxy,Total (for eval of Vitamin D levels)    Vitamin B12    Iron and TIBC    Follow Up In [facility] - PCP - Established    Medicare annual wellness visit, subsequent - Primary    Current Assessment & Plan     Up-to-date with screening vaccinations advance medical directives completed         Relevant Orders    Tsh With Reflex To Free T4 If Abnormal    Lipid Panel    PTH, intact    Vitamin D 25-Hydroxy,Total (for eval of Vitamin D levels)     Vitamin B12    Iron and TIBC    Follow Up In [facility] - PCP - Established    COPD, severe (CMS/[facility])    Overview     >>OVERVIEW FOR COPD (CHRONIC OBSTRUCTIVE PULMONARY DISEASE) (CMS/[facility]) WRITTEN ON 9/21/2023  4:09 AM BY LIZETT Nichole Assessment & Plan: Formatting of this note might be different from the original. Images from the original note were not included. Assessment: Severe COPD. Follows with Tona Dhillon APRN-CNP  pulmonology, last OV 1/20/2023. Compliant on inhalers. Denies recent exacerbations or hospitalizations. Previously required home oxygen use, no longer requires. Recent pulmonary testing PFT's below. Lungs CTA. No longer uses tobacco. 11/21/2022:         Current Assessment & Plan     Patient no longer requiring use of home oxygen maintaining oxygen levels with hemodialysis improvement in fluid overload continue with nebulizer therapy reformed smoker since last year and maintenance for over 1 year improvementCompliant on inhalers. Denies recent exacerbations or hospitalizations. Previously required home oxygen use, no longer requires. Recent pulmonary testing PFT's below. Lungs CTA. No longer uses tobacco. 11/21/2022: Continue on Trelegy inhaler         Relevant Medications    famotidine (Pepcid) 40 mg tablet    methIMAzole (Tapazole) 5 mg tablet    Other Relevant Orders    Tsh With Reflex To Free T4 If Abnormal    Lipid Panel    PTH, intact    Vitamin D 25-Hydroxy,Total (for eval of Vitamin D levels)    Vitamin B12    Iron and TIBC    Follow Up In [facility] - PCP - Established    RESOLVED: End stage renal disease (CMS/[facility])    Relevant Medications    famotidine (Pepcid) 40 mg tablet    methIMAzole (Tapazole) 5 mg tablet    Other Relevant Orders    Tsh With Reflex To Free T4 If Abnormal    Lipid Panel    PTH, intact    Vitamin D 25-Hydroxy,Total (for eval of Vitamin D levels)    Vitamin B12    Iron and TIBC    Follow Up In [facility] - PCP - Established     Secondary hyperparathyroidism of renal origin (CMS/HCC)    Current Assessment & Plan     Reevaluate vitamin D levels intact PTH continue management with nephrologist         Relevant Orders    Tsh With Reflex To Free T4 If Abnormal    Lipid Panel    PTH, intact    Vitamin D 25-Hydroxy,Total (for eval of Vitamin D levels)    Vitamin B12    Iron and TIBC    Follow Up In [facility] - PCP - Established

## 2023-11-15 NOTE — PROGRESS NOTES
"Subjective   Reason for Visit: Renard Ward is an 73 y.o. male here for a Medicare Wellness visit.          Reviewed all medications by prescribing practitioner or clinical pharmacist (such as prescriptions, OTCs, herbal therapies and supplements) and documented in the medical record.    HPI    Patient Care Team:  Leandro Bassett DO as PCP - General  Leandro Bassett DO as PCP - [employer] PCP     Review of Systems    Objective   Vitals:  /70 (BP Location: Right arm, Patient Position: Sitting)   Pulse 68   Ht 1.753 m (5' 9\")   Wt 80.7 kg (178 lb)   BMI 26.29 kg/m²       Physical Exam    Assessment/Plan   Problem List Items Addressed This Visit       Atherosclerosis of aorta (CMS/HCC)    Other dietary vitamin B12 deficiency anemia    Vitamin D deficiency    Chronic respiratory failure with hypoxia (CMS/HCC)    Dyslipidemia, goal LDL below 70     Other Visit Diagnoses       COPD, severe (CMS/HCC)        End stage renal disease (CMS/HCC)        Goiter with hyperthyroidism                   "

## 2023-11-17 PROBLEM — F17.200 NEEDS SMOKING CESSATION EDUCATION: Status: RESOLVED | Noted: 2023-04-17 | Resolved: 2023-11-17

## 2023-11-17 PROBLEM — N18.6 END STAGE RENAL DISEASE (MULTI): Status: RESOLVED | Noted: 2023-11-15 | Resolved: 2023-11-17

## 2023-11-17 PROBLEM — J44.9 COPD, SEVERE (MULTI): Status: ACTIVE | Noted: 2020-05-27

## 2023-11-17 NOTE — ASSESSMENT & PLAN NOTE
Patient had cardio ablation completed on September 22, 2023 with good results current normal sinus rhythm continue with chronic anticoagulation with history of stroke on apixaban 5 mg twice a day consider reduction in dose in the setting of end-stage renal disease

## 2023-11-17 NOTE — ASSESSMENT & PLAN NOTE
Patient no longer requiring use of home oxygen maintaining oxygen levels with hemodialysis improvement in fluid overload continue with nebulizer therapy reformed smoker since last year and maintenance for over 1 year improvementCompliant on inhalers. Denies recent exacerbations or hospitalizations. Previously required home oxygen use, no longer requires. Recent pulmonary testing PFT's below. Lungs CTA. No longer uses tobacco. 11/21/2022: Continue on Trelegy inhaler

## 2023-11-17 NOTE — ASSESSMENT & PLAN NOTE
Patient currently on home hemodialysis and doing very well continue to monitor closely with nephrology

## 2023-11-17 NOTE — ASSESSMENT & PLAN NOTE
>>ASSESSMENT AND PLAN FOR END STAGE RENAL FAILURE ON DIALYSIS (MULTI) WRITTEN ON 11/17/2023  8:33 AM BY DEVORAH MOSES, DO    Patient currently on home hemodialysis and doing very well continue to monitor closely with nephrology

## 2023-11-17 NOTE — ASSESSMENT & PLAN NOTE
Could benefit from iron replacement therapy with hemoglobin of 9.6 consider Neupogen derivatives in the setting of dialysis and consider IV iron supplementation reevaluate with next blood work

## 2023-12-04 NOTE — PROGRESS NOTES
Electrophysiology Follow up Visit    Renard Ward is a 73 y.o. year old male patient with  1. ESRD- current doing HD at home     2. TIA-possibly related to disruption of oral anticoagulants which has been resumed now     3. Atrial flutter: s/p multiple cardioversions. They usually would last ~5 years each but recurrent AFL in 2020 and had CTI ablation in 2020. Patient claims he has done well until he had recent hospital admission in early July 2023 for sepsis from UTI and then again in early August for pneumonia. He denies palpitations though has intermittent dizziness for the past month. Follow up with general cardiology showed atrial flutter. Last ECG in sinus rhythm was June 2023. LV function was reduced 45-50%. After reviewed ECGs AFL was different than his prior AFL that was ablated, possible reverse typical atrial flutter now. He was agreeable to ablation as AAD therapy would be difficult due to CKD.      9/22/2023 Successful typical flutter ablation. During the EP study the arrhythmia appeared to typical flutter breaking through anterior aspect of prior CTI line.    Patient here for follow-up for atrial flutter.  He reports he has been feeling well with no palpitation or symptoms of atrial flutter.  He is currently doing hemodialysis at home 4 days a week.  He reports heart rates are mainly in the 70s.       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), 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), Nicotine dependence, unspecified, uncomplicated (01/14/2020), Nontoxic multinodular goiter (11/19/2020), Obstructive sleep apnea (adult) (pediatric) (03/11/2020), Other allergic rhinitis (02/23/2021), Other fecal abnormalities (07/30/2021), Other hypersomnia (01/23/2020), Other hypersomnia (01/23/2020), Other ill-defined heart diseases (04/27/2020), Other pericardial effusion (noninflammatory) (06/18/2020), Other specified personal risk factors, not elsewhere classified (07/30/2020), Other specified symptoms and signs involving the circulatory and respiratory systems (10/10/2019), Personal history of diseases of the skin and subcutaneous tissue (11/19/2020), Personal history of nicotine dependence (08/18/2021), Personal history of other diseases of the digestive system, Personal history of other diseases of the nervous system and sense organs, Personal history of other endocrine, nutritional and metabolic disease (11/19/2020), Personal history of other endocrine, nutritional and metabolic disease (10/14/2020), Personal history of other endocrine, nutritional and metabolic disease (08/20/2020), Personal history of other infectious and parasitic diseases (07/06/2021), Personal history of other infectious and parasitic diseases (02/04/2021), Personal history of other specified conditions (04/12/2016), Personal history of other specified conditions (04/07/2016), Personal history of transient ischemic attack (TIA), and cerebral infarction without residual deficits, Pleural effusion, not elsewhere classified (07/30/2020), Pleural effusion, not elsewhere classified (03/11/2020), Pneumonia due to Streptococcus pneumoniae (CMS/HCC) (03/11/2020), Post-traumatic stress disorder, unspecified, Rash and other nonspecific skin eruption (03/25/2021), Secondary polycythemia (01/23/2020), and Tinea unguium (08/25/2020).    Social History   reports  "that he has quit smoking. His smoking use included cigarettes. He has never used smokeless tobacco. He reports that he does not currently use alcohol. He reports that he does not use drugs.      FamHx:   Family History   Family history unknown: Yes       Allergies   Allergen Reactions    Penicillins Anaphylaxis and Hives        Outpatient Medications:  Current Outpatient Medications   Medication Instructions    atorvastatin (LIPITOR) 40 mg, oral, Nightly    calcium acetate (Phoslo) 667 mg capsule oral    carvedilol (COREG) 6.25 mg, oral, 2 times daily, (STOP METOPROLOL)    cefepime (Maxipime) 1 gram injection intravenous    cyanocobalamin (vitamin B-12) (VITAMIN B-12) 1,000 mcg, oral, Daily    Eliquis 5 mg, oral, 2 times daily    ergocalciferol (VITAMIN D-2) 1,250 mcg, oral, Weekly    famotidine (PEPCID) 40 mg, oral, 2 times daily    fluticasone-umeclidin-vilanter (Trelegy Ellipta) 100-62.5-25 mcg blister with device 1 puff, inhalation, Daily RT    methIMAzole (TAPAZOLE) 5 mg, oral, Daily    montelukast (SINGULAIR) 10 mg, oral, Nightly    Mary Grace-Darryl Rx 1- mg-mg-mcg tablet 1 tablet, oral, Daily    torsemide (DEMADEX) 20 mg, oral, 2 times daily         Last Recorded Vitals: .vital      6/15/2023     3:59 PM 8/15/2023    11:48 AM 8/15/2023    11:50 AM 9/22/2023    10:13 AM 10/4/2023    10:10 AM 11/15/2023     2:55 PM 12/6/2023     8:47 AM   Vitals   Systolic 128 108   130 132 156   Diastolic 62 60   62 70 84   Heart Rate 72 72   78 68 82   Height (in) 1.753 m (5' 9\") 1.753 m (5' 9\") 1.753 m (5' 9\") 1.752 m (5' 8.98\") 1.753 m (5' 9\") 1.753 m (5' 9\") 1.753 m (5' 9\")   Weight (lb) 197 171 171 170.2 172 178 179.2   BMI 29.09 kg/m2 25.25 kg/m2 25.25 kg/m2 25.15 kg/m2 25.4 kg/m2 26.29 kg/m2 26.46 kg/m2   BSA (m2) 2.09 m2 1.94 m2 1.94 m2 1.94 m2 1.95 m2 1.98 m2 1.99 m2   Visit Report  Report Report  Report Report Report    Visit Vitals  /84   Pulse 82   Ht 1.753 m (5' 9\")   Wt 81.3 kg (179 lb 3.2 oz)   BMI 26.46 " kg/m²   Smoking Status Former   BSA 1.99 m²        Physical Exam  Constitutional:       Appearance: Normal appearance.   Eyes:      Pupils: Pupils are equal, round, and reactive to light.   Cardiovascular:      Rate and Rhythm: Normal rate and regular rhythm.      Heart sounds: Normal heart sounds.   Pulmonary:      Effort: Pulmonary effort is normal.      Breath sounds: Normal breath sounds.   Musculoskeletal:         General: Normal range of motion.   Skin:     General: Skin is warm and dry.   Neurological:      Mental Status: He is alert and oriented to person, place, and time.        My Interpretation of Reviewed Study(s):  Echo(July/2020):   CONCLUSIONS:   1. The left ventricular systolic function is mildly decreased with a 45-50% estimated ejection fraction.   2. RVSP within normal limits.   3. There is global hypokinesis of the left ventricle with minor regional variations.  ECGs (reviewed and my interpretation):   12/6/2023 sinus rhythm with rate of 76 bpm, with PAC    Lab Results   Component Value Date    WBC 8.9 09/21/2023    HGB 9.6 (L) 09/21/2023    HCT 31.6 (L) 09/21/2023     09/21/2023    ALT 13 08/29/2023    AST 12 08/29/2023     09/21/2023    K 4.7 09/21/2023     09/21/2023    CREATININE 6.25 (H) 09/21/2023    BUN 27 (H) 09/21/2023    CO2 32 09/21/2023    TSH 0.08 (L) 08/31/2023    INR 1.4 (H) 09/21/2023       Assessment  1.  Atrial flutter: Patient post ablation from September.  ECG today shows sinus rhythm with PAC.  Patient with no recurrence of atrial arrhythmias since his ablation.  He will continue to follow general cardiology and can be referred back if further concern arises.    PLAN  Continue to follow with general cardiology as scheduled      Exclusive of any other services or procedures performed, Fidelia MOLINA APRN-CNP , spent 30 minutes in duration for this visit today.  This time consisted of chart review, obtaining history, and/or performing the exam as  documented above as well as documenting the clinical information for the encounter in the electronic record, discussing treatment options, plans, and/or goals with patient, family, and/or caregiver, refilling medications, updating the electronic record, ordering medicines, lab work, imaging, referrals, and/or procedures as documented above and communicating with other [facility] professionals. I have discussed the results of laboratory, radiology, and cardiology studies with the patient and their family/caregiver.

## 2023-12-06 ENCOUNTER — OFFICE VISIT (OUTPATIENT)
Dept: CARDIOLOGY | Facility: CLINIC | Age: 73
End: 2023-12-06
Payer: MEDICARE

## 2023-12-06 VITALS
SYSTOLIC BLOOD PRESSURE: 156 MMHG | WEIGHT: 179.2 LBS | HEART RATE: 82 BPM | HEIGHT: 69 IN | DIASTOLIC BLOOD PRESSURE: 84 MMHG | BODY MASS INDEX: 26.54 KG/M2

## 2023-12-06 DIAGNOSIS — E78.5 DYSLIPIDEMIA, GOAL LDL BELOW 70: ICD-10-CM

## 2023-12-06 DIAGNOSIS — I48.3 TYPICAL ATRIAL FLUTTER (MULTI): Primary | ICD-10-CM

## 2023-12-06 PROCEDURE — 1159F MED LIST DOCD IN RCRD: CPT | Performed by: NURSE PRACTITIONER

## 2023-12-06 PROCEDURE — 1160F RVW MEDS BY RX/DR IN RCRD: CPT | Performed by: NURSE PRACTITIONER

## 2023-12-06 PROCEDURE — 1036F TOBACCO NON-USER: CPT | Performed by: NURSE PRACTITIONER

## 2023-12-06 PROCEDURE — 93000 ELECTROCARDIOGRAM COMPLETE: CPT | Performed by: INTERNAL MEDICINE

## 2023-12-06 PROCEDURE — 99214 OFFICE O/P EST MOD 30 MIN: CPT | Performed by: NURSE PRACTITIONER

## 2023-12-06 RX ORDER — ATORVASTATIN CALCIUM 40 MG/1
40 TABLET, FILM COATED ORAL NIGHTLY
Qty: 90 TABLET | Refills: 3 | Status: SHIPPED | OUTPATIENT
Start: 2023-12-06

## 2024-01-19 ENCOUNTER — TELEPHONE (OUTPATIENT)
Dept: CARDIOLOGY | Facility: CLINIC | Age: 74
End: 2024-01-19
Payer: MEDICARE

## 2024-01-19 NOTE — TELEPHONE ENCOUNTER
He called saying he thinks he may be out of rhythm.  Patient post ablation from September.   He saw Fidelia 12/6/23 and was in SR at that time.  He tells me he has been moderately short of breath and noted his pulse ox has been reading low 90s.  Also he has been somewhat congested and has a productive cough.  I told him we can get him in for an EKG on Monday but there could be more going on like an infection or something else.  He should be seen in ER over the weekend if he has more symptoms.  He verbalized understanding.

## 2024-01-22 ENCOUNTER — CLINICAL SUPPORT (OUTPATIENT)
Dept: CARDIOLOGY | Facility: CLINIC | Age: 74
End: 2024-01-22
Payer: MEDICARE

## 2024-01-22 ENCOUNTER — DOCUMENTATION (OUTPATIENT)
Dept: CARDIOLOGY | Facility: CLINIC | Age: 74
End: 2024-01-22

## 2024-01-22 VITALS — DIASTOLIC BLOOD PRESSURE: 76 MMHG | SYSTOLIC BLOOD PRESSURE: 160 MMHG

## 2024-01-22 DIAGNOSIS — I48.92 ATRIAL FLUTTER, PAROXYSMAL (MULTI): ICD-10-CM

## 2024-01-22 NOTE — PROGRESS NOTES
1/22/24  0910  Patient in for an EKG due to being in AF over the weekend for approximately 30 minutes.  EKG shows sinus rhythm at 85 BPM.  O@ sats on supplemental 02 at 99%.    Patient reports he doesn't use his supplemental 02 very often but has been SOB; his dialysis nurse mentioned maybe needing to take extra fluid off.  Instructed patient to notify his PCP for continued SOB; patient verbalized understanding.

## 2024-02-20 ENCOUNTER — APPOINTMENT (OUTPATIENT)
Dept: CARDIOLOGY | Facility: CLINIC | Age: 74
End: 2024-02-20
Payer: MEDICARE

## 2024-02-21 ENCOUNTER — APPOINTMENT (OUTPATIENT)
Dept: PRIMARY CARE | Facility: CLINIC | Age: 74
End: 2024-02-21
Payer: MEDICARE

## 2024-03-05 ENCOUNTER — APPOINTMENT (OUTPATIENT)
Dept: CARDIOLOGY | Facility: HOSPITAL | Age: 74
DRG: 177 | End: 2024-03-05
Payer: MEDICARE

## 2024-03-05 ENCOUNTER — APPOINTMENT (OUTPATIENT)
Dept: RADIOLOGY | Facility: HOSPITAL | Age: 74
DRG: 177 | End: 2024-03-05
Payer: MEDICARE

## 2024-03-05 ENCOUNTER — HOSPITAL ENCOUNTER (INPATIENT)
Facility: HOSPITAL | Age: 74
LOS: 2 days | Discharge: HOME | DRG: 177 | End: 2024-03-07
Attending: EMERGENCY MEDICINE | Admitting: INTERNAL MEDICINE
Payer: MEDICARE

## 2024-03-05 DIAGNOSIS — J15.9 COMMUNITY ACQUIRED BACTERIAL PNEUMONIA: Primary | ICD-10-CM

## 2024-03-05 DIAGNOSIS — J18.9 COMMUNITY ACQUIRED PNEUMONIA, UNSPECIFIED LATERALITY: ICD-10-CM

## 2024-03-05 DIAGNOSIS — J96.01 ACUTE RESPIRATORY FAILURE WITH HYPOXIA (MULTI): ICD-10-CM

## 2024-03-05 LAB
ALBUMIN SERPL BCP-MCNC: 3.7 G/DL (ref 3.4–5)
ALP SERPL-CCNC: 56 U/L (ref 33–136)
ALT SERPL W P-5'-P-CCNC: 15 U/L (ref 10–52)
ANION GAP BLDV CALCULATED.4IONS-SCNC: 8 MMOL/L (ref 10–25)
ANION GAP SERPL CALC-SCNC: 12 MMOL/L (ref 10–20)
AST SERPL W P-5'-P-CCNC: 9 U/L (ref 9–39)
BASE EXCESS BLDV CALC-SCNC: 6.2 MMOL/L (ref -2–3)
BASOPHILS # BLD AUTO: 0.07 X10*3/UL (ref 0–0.1)
BASOPHILS NFR BLD AUTO: 0.7 %
BILIRUB SERPL-MCNC: 0.4 MG/DL (ref 0–1.2)
BNP SERPL-MCNC: 852 PG/ML (ref 0–99)
BODY TEMPERATURE: 37 DEGREES CELSIUS
BUN SERPL-MCNC: 37 MG/DL (ref 6–23)
CA-I BLDV-SCNC: 1.19 MMOL/L (ref 1.1–1.33)
CALCIUM SERPL-MCNC: 8.9 MG/DL (ref 8.6–10.3)
CARDIAC TROPONIN I PNL SERPL HS: 19 NG/L (ref 0–20)
CARDIAC TROPONIN I PNL SERPL HS: 20 NG/L (ref 0–20)
CHLORIDE BLDV-SCNC: 101 MMOL/L (ref 98–107)
CHLORIDE SERPL-SCNC: 102 MMOL/L (ref 98–107)
CO2 SERPL-SCNC: 30 MMOL/L (ref 21–32)
CREAT SERPL-MCNC: 5.45 MG/DL (ref 0.5–1.3)
EGFRCR SERPLBLD CKD-EPI 2021: 10 ML/MIN/1.73M*2
EOSINOPHIL # BLD AUTO: 0.89 X10*3/UL (ref 0–0.4)
EOSINOPHIL NFR BLD AUTO: 9.5 %
ERYTHROCYTE [DISTWIDTH] IN BLOOD BY AUTOMATED COUNT: 16.1 % (ref 11.5–14.5)
FLUAV RNA RESP QL NAA+PROBE: NOT DETECTED
FLUBV RNA RESP QL NAA+PROBE: NOT DETECTED
GLUCOSE BLDV-MCNC: 83 MG/DL (ref 74–99)
GLUCOSE SERPL-MCNC: 86 MG/DL (ref 74–99)
HCO3 BLDV-SCNC: 33.7 MMOL/L (ref 22–26)
HCT VFR BLD AUTO: 33.3 % (ref 41–52)
HCT VFR BLD EST: 32 % (ref 41–52)
HGB BLD-MCNC: 10.5 G/DL (ref 13.5–17.5)
HGB BLDV-MCNC: 10.5 G/DL (ref 13.5–17.5)
IMM GRANULOCYTES # BLD AUTO: 0.04 X10*3/UL (ref 0–0.5)
IMM GRANULOCYTES NFR BLD AUTO: 0.4 % (ref 0–0.9)
INHALED O2 CONCENTRATION: 40 %
INR PPP: 1.5 (ref 0.9–1.1)
LACTATE BLDV-SCNC: 1.2 MMOL/L (ref 0.4–2)
LACTATE SERPL-SCNC: 0.6 MMOL/L (ref 0.4–2)
LYMPHOCYTES # BLD AUTO: 1.01 X10*3/UL (ref 0.8–3)
LYMPHOCYTES NFR BLD AUTO: 10.8 %
MAGNESIUM SERPL-MCNC: 1.6 MG/DL (ref 1.6–2.4)
MCH RBC QN AUTO: 30.1 PG (ref 26–34)
MCHC RBC AUTO-ENTMCNC: 31.5 G/DL (ref 32–36)
MCV RBC AUTO: 95 FL (ref 80–100)
MONOCYTES # BLD AUTO: 0.73 X10*3/UL (ref 0.05–0.8)
MONOCYTES NFR BLD AUTO: 7.8 %
NEUTROPHILS # BLD AUTO: 6.6 X10*3/UL (ref 1.6–5.5)
NEUTROPHILS NFR BLD AUTO: 70.8 %
NRBC BLD-RTO: 0 /100 WBCS (ref 0–0)
OXYHGB MFR BLDV: 43 % (ref 45–75)
PCO2 BLDV: 64 MM HG (ref 41–51)
PH BLDV: 7.33 PH (ref 7.33–7.43)
PLATELET # BLD AUTO: 194 X10*3/UL (ref 150–450)
PO2 BLDV: 33 MM HG (ref 35–45)
POTASSIUM BLDV-SCNC: 5.1 MMOL/L (ref 3.5–5.3)
POTASSIUM SERPL-SCNC: 5 MMOL/L (ref 3.5–5.3)
PROT SERPL-MCNC: 6 G/DL (ref 6.4–8.2)
PROTHROMBIN TIME: 16.9 SECONDS (ref 9.8–12.8)
RBC # BLD AUTO: 3.49 X10*6/UL (ref 4.5–5.9)
RSV RNA RESP QL NAA+PROBE: NOT DETECTED
SAO2 % BLDV: 44 % (ref 45–75)
SARS-COV-2 RNA RESP QL NAA+PROBE: NOT DETECTED
SODIUM BLDV-SCNC: 138 MMOL/L (ref 136–145)
SODIUM SERPL-SCNC: 139 MMOL/L (ref 136–145)
WBC # BLD AUTO: 9.3 X10*3/UL (ref 4.4–11.3)

## 2024-03-05 PROCEDURE — 82330 ASSAY OF CALCIUM: CPT | Performed by: EMERGENCY MEDICINE

## 2024-03-05 PROCEDURE — 93005 ELECTROCARDIOGRAM TRACING: CPT

## 2024-03-05 PROCEDURE — 84484 ASSAY OF TROPONIN QUANT: CPT | Performed by: EMERGENCY MEDICINE

## 2024-03-05 PROCEDURE — 85610 PROTHROMBIN TIME: CPT | Performed by: EMERGENCY MEDICINE

## 2024-03-05 PROCEDURE — 87040 BLOOD CULTURE FOR BACTERIA: CPT | Mod: PORLAB | Performed by: EMERGENCY MEDICINE

## 2024-03-05 PROCEDURE — 76604 US EXAM CHEST: CPT | Performed by: EMERGENCY MEDICINE

## 2024-03-05 PROCEDURE — 83880 ASSAY OF NATRIURETIC PEPTIDE: CPT | Performed by: EMERGENCY MEDICINE

## 2024-03-05 PROCEDURE — 2500000001 HC RX 250 WO HCPCS SELF ADMINISTERED DRUGS (ALT 637 FOR MEDICARE OP): Performed by: INTERNAL MEDICINE

## 2024-03-05 PROCEDURE — 99285 EMERGENCY DEPT VISIT HI MDM: CPT | Mod: 25

## 2024-03-05 PROCEDURE — 2500000002 HC RX 250 W HCPCS SELF ADMINISTERED DRUGS (ALT 637 FOR MEDICARE OP, ALT 636 FOR OP/ED): Performed by: INTERNAL MEDICINE

## 2024-03-05 PROCEDURE — 83605 ASSAY OF LACTIC ACID: CPT | Performed by: EMERGENCY MEDICINE

## 2024-03-05 PROCEDURE — 71045 X-RAY EXAM CHEST 1 VIEW: CPT | Performed by: RADIOLOGY

## 2024-03-05 PROCEDURE — 87637 SARSCOV2&INF A&B&RSV AMP PRB: CPT | Performed by: EMERGENCY MEDICINE

## 2024-03-05 PROCEDURE — 1200000002 HC GENERAL ROOM WITH TELEMETRY DAILY

## 2024-03-05 PROCEDURE — 96365 THER/PROPH/DIAG IV INF INIT: CPT | Mod: 59

## 2024-03-05 PROCEDURE — 85025 COMPLETE CBC W/AUTO DIFF WBC: CPT | Performed by: EMERGENCY MEDICINE

## 2024-03-05 PROCEDURE — 2500000004 HC RX 250 GENERAL PHARMACY W/ HCPCS (ALT 636 FOR OP/ED): Performed by: EMERGENCY MEDICINE

## 2024-03-05 PROCEDURE — 83735 ASSAY OF MAGNESIUM: CPT | Performed by: EMERGENCY MEDICINE

## 2024-03-05 PROCEDURE — 71045 X-RAY EXAM CHEST 1 VIEW: CPT

## 2024-03-05 PROCEDURE — 99223 1ST HOSP IP/OBS HIGH 75: CPT | Performed by: INTERNAL MEDICINE

## 2024-03-05 PROCEDURE — 84132 ASSAY OF SERUM POTASSIUM: CPT | Performed by: EMERGENCY MEDICINE

## 2024-03-05 PROCEDURE — 36415 COLL VENOUS BLD VENIPUNCTURE: CPT | Performed by: EMERGENCY MEDICINE

## 2024-03-05 PROCEDURE — 96367 TX/PROPH/DG ADDL SEQ IV INF: CPT

## 2024-03-05 RX ORDER — LEVOFLOXACIN 500 MG/1
500 TABLET, FILM COATED ORAL
Status: DISCONTINUED | OUTPATIENT
Start: 2024-03-07 | End: 2024-03-07 | Stop reason: HOSPADM

## 2024-03-05 RX ORDER — LOSARTAN POTASSIUM 100 MG/1
100 TABLET ORAL DAILY
COMMUNITY
End: 2024-04-21 | Stop reason: ENTERED-IN-ERROR

## 2024-03-05 RX ORDER — TALC
3 POWDER (GRAM) TOPICAL NIGHTLY
Status: DISCONTINUED | OUTPATIENT
Start: 2024-03-05 | End: 2024-03-07 | Stop reason: HOSPADM

## 2024-03-05 RX ORDER — ATORVASTATIN CALCIUM 40 MG/1
40 TABLET, FILM COATED ORAL NIGHTLY
Status: DISCONTINUED | OUTPATIENT
Start: 2024-03-05 | End: 2024-03-07 | Stop reason: HOSPADM

## 2024-03-05 RX ORDER — CARVEDILOL 6.25 MG/1
1 TABLET ORAL
COMMUNITY
End: 2024-05-15 | Stop reason: SDUPTHER

## 2024-03-05 RX ORDER — FLUTICASONE FUROATE AND VILANTEROL 200; 25 UG/1; UG/1
1 POWDER RESPIRATORY (INHALATION)
Status: DISCONTINUED | OUTPATIENT
Start: 2024-03-05 | End: 2024-03-07 | Stop reason: HOSPADM

## 2024-03-05 RX ORDER — METHIMAZOLE 5 MG/1
5 TABLET ORAL DAILY
Status: DISCONTINUED | OUTPATIENT
Start: 2024-03-05 | End: 2024-03-07 | Stop reason: HOSPADM

## 2024-03-05 RX ORDER — ACETAMINOPHEN 160 MG/5ML
650 SOLUTION ORAL EVERY 4 HOURS PRN
Status: DISCONTINUED | OUTPATIENT
Start: 2024-03-05 | End: 2024-03-07 | Stop reason: HOSPADM

## 2024-03-05 RX ORDER — FAMOTIDINE 20 MG/1
40 TABLET, FILM COATED ORAL 2 TIMES DAILY
Status: DISCONTINUED | OUTPATIENT
Start: 2024-03-05 | End: 2024-03-05 | Stop reason: DRUGHIGH

## 2024-03-05 RX ORDER — ACETAMINOPHEN 325 MG/1
650 TABLET ORAL EVERY 4 HOURS PRN
Status: DISCONTINUED | OUTPATIENT
Start: 2024-03-05 | End: 2024-03-07 | Stop reason: HOSPADM

## 2024-03-05 RX ORDER — MONTELUKAST SODIUM 10 MG/1
10 TABLET ORAL NIGHTLY
Status: DISCONTINUED | OUTPATIENT
Start: 2024-03-05 | End: 2024-03-07 | Stop reason: HOSPADM

## 2024-03-05 RX ORDER — ACETAMINOPHEN 650 MG/1
650 SUPPOSITORY RECTAL EVERY 4 HOURS PRN
Status: DISCONTINUED | OUTPATIENT
Start: 2024-03-05 | End: 2024-03-07 | Stop reason: HOSPADM

## 2024-03-05 RX ORDER — POLYETHYLENE GLYCOL 3350 17 G/17G
17 POWDER, FOR SOLUTION ORAL
Status: DISCONTINUED | OUTPATIENT
Start: 2024-03-05 | End: 2024-03-07 | Stop reason: HOSPADM

## 2024-03-05 RX ORDER — FAMOTIDINE 20 MG/1
20 TABLET, FILM COATED ORAL NIGHTLY
Status: DISCONTINUED | OUTPATIENT
Start: 2024-03-05 | End: 2024-03-07 | Stop reason: HOSPADM

## 2024-03-05 RX ORDER — LEVOFLOXACIN 5 MG/ML
750 INJECTION, SOLUTION INTRAVENOUS ONCE
Status: COMPLETED | OUTPATIENT
Start: 2024-03-05 | End: 2024-03-05

## 2024-03-05 RX ADMIN — ATORVASTATIN CALCIUM 40 MG: 40 TABLET, FILM COATED ORAL at 21:35

## 2024-03-05 RX ADMIN — FLUTICASONE FUROATE AND VILANTEROL TRIFENATATE 1 PUFF: 200; 25 POWDER RESPIRATORY (INHALATION) at 21:36

## 2024-03-05 RX ADMIN — Medication 3 MG: at 21:35

## 2024-03-05 RX ADMIN — FAMOTIDINE 20 MG: 20 TABLET ORAL at 21:35

## 2024-03-05 RX ADMIN — TIOTROPIUM BROMIDE INHALATION SPRAY 2 PUFF: 3.12 SPRAY, METERED RESPIRATORY (INHALATION) at 21:36

## 2024-03-05 RX ADMIN — APIXABAN 5 MG: 5 TABLET, FILM COATED ORAL at 21:35

## 2024-03-05 RX ADMIN — ERTAPENEM SODIUM 750 MG: 1 INJECTION, POWDER, LYOPHILIZED, FOR SOLUTION INTRAMUSCULAR; INTRAVENOUS at 12:46

## 2024-03-05 RX ADMIN — MONTELUKAST 10 MG: 10 TABLET, FILM COATED ORAL at 21:35

## 2024-03-05 RX ADMIN — LEVOFLOXACIN 750 MG: 750 INJECTION, SOLUTION INTRAVENOUS at 13:30

## 2024-03-05 SDOH — SOCIAL STABILITY: SOCIAL INSECURITY: ABUSE: ADULT

## 2024-03-05 SDOH — SOCIAL STABILITY: SOCIAL INSECURITY: WERE YOU ABLE TO COMPLETE ALL THE BEHAVIORAL HEALTH SCREENINGS?: YES

## 2024-03-05 SDOH — SOCIAL STABILITY: SOCIAL INSECURITY: HAVE YOU HAD THOUGHTS OF HARMING ANYONE ELSE?: NO

## 2024-03-05 ASSESSMENT — ACTIVITIES OF DAILY LIVING (ADL)
HEARING - RIGHT EAR: HEARING AID
PATIENT'S MEMORY ADEQUATE TO SAFELY COMPLETE DAILY ACTIVITIES?: YES
JUDGMENT_ADEQUATE_SAFELY_COMPLETE_DAILY_ACTIVITIES: YES
ADEQUATE_TO_COMPLETE_ADL: YES
WALKS IN HOME: INDEPENDENT
GROOMING: INDEPENDENT
TOILETING: INDEPENDENT
HEARING - LEFT EAR: HEARING AID
DRESSING YOURSELF: INDEPENDENT
FEEDING YOURSELF: INDEPENDENT
BATHING: INDEPENDENT
LACK_OF_TRANSPORTATION: NO

## 2024-03-05 ASSESSMENT — PATIENT HEALTH QUESTIONNAIRE - PHQ9
1. LITTLE INTEREST OR PLEASURE IN DOING THINGS: NOT AT ALL
2. FEELING DOWN, DEPRESSED OR HOPELESS: NOT AT ALL
SUM OF ALL RESPONSES TO PHQ9 QUESTIONS 1 & 2: 0

## 2024-03-05 ASSESSMENT — PAIN SCALES - GENERAL
PAINLEVEL_OUTOF10: 0 - NO PAIN

## 2024-03-05 ASSESSMENT — COGNITIVE AND FUNCTIONAL STATUS - GENERAL
DAILY ACTIVITIY SCORE: 24
MOBILITY SCORE: 24
DAILY ACTIVITIY SCORE: 24
MOBILITY SCORE: 24
PATIENT BASELINE BEDBOUND: NO

## 2024-03-05 ASSESSMENT — COLUMBIA-SUICIDE SEVERITY RATING SCALE - C-SSRS
2. HAVE YOU ACTUALLY HAD ANY THOUGHTS OF KILLING YOURSELF?: NO
6. HAVE YOU EVER DONE ANYTHING, STARTED TO DO ANYTHING, OR PREPARED TO DO ANYTHING TO END YOUR LIFE?: NO
1. IN THE PAST MONTH, HAVE YOU WISHED YOU WERE DEAD OR WISHED YOU COULD GO TO SLEEP AND NOT WAKE UP?: NO

## 2024-03-05 ASSESSMENT — LIFESTYLE VARIABLES
EVER FELT BAD OR GUILTY ABOUT YOUR DRINKING: NO
AUDIT-C TOTAL SCORE: 0
HAVE YOU EVER FELT YOU SHOULD CUT DOWN ON YOUR DRINKING: NO
EVER HAD A DRINK FIRST THING IN THE MORNING TO STEADY YOUR NERVES TO GET RID OF A HANGOVER: NO
HOW OFTEN DO YOU HAVE 6 OR MORE DRINKS ON ONE OCCASION: NEVER
HOW MANY STANDARD DRINKS CONTAINING ALCOHOL DO YOU HAVE ON A TYPICAL DAY: PATIENT DOES NOT DRINK
SKIP TO QUESTIONS 9-10: 1
HAVE PEOPLE ANNOYED YOU BY CRITICIZING YOUR DRINKING: NO
AUDIT-C TOTAL SCORE: 0
HOW OFTEN DO YOU HAVE A DRINK CONTAINING ALCOHOL: NEVER

## 2024-03-05 ASSESSMENT — ENCOUNTER SYMPTOMS
AGITATION: 0
SHORTNESS OF BREATH: 1
WEAKNESS: 1
CONFUSION: 0
FEVER: 0
COUGH: 1
CHEST TIGHTNESS: 0
FATIGUE: 1
NAUSEA: 0
DIARRHEA: 0
TROUBLE SWALLOWING: 0
APNEA: 1
DIZZINESS: 0
CHOKING: 0

## 2024-03-05 ASSESSMENT — PAIN - FUNCTIONAL ASSESSMENT
PAIN_FUNCTIONAL_ASSESSMENT: 0-10
PAIN_FUNCTIONAL_ASSESSMENT: 0-10

## 2024-03-05 NOTE — ED PROVIDER NOTES
HPI   Chief Complaint   Patient presents with    Shortness of Breath       HPI     HISTORY OF PRESENT ILLNESS:  Patient is 73 years old who presents emergency department 1 week of worsening shortness of breath.  He states that he is having shortness of breath at baseline and is worsened with exertion.  Patient feels better when he leans forward.  The patient also has had worsening orthopnea and has been sleeping in recliner for the last 3 days.  He receives home dialysis 4 times a week, typically Monday Tuesday Thursday Friday.  Patient's last dialysis session was yesterday. Patient states that his dry weight is 80 kg and that he is currently weighing 80 kg.    Past Medical History: ESRD, CHF (45%), afib on eliquis, CAD, COPD not currently on oxygen, pericarditis  Past Surgical History: Hemodialysis via right subclavian line  Family History: family history not pertinent to presenting problem or chief complaint  Social History: Non-smoker currently, former    __________________________________________________________  PHYSICAL EXAM:    Appearance: Alert, oriented , cooperative   Skin: Intact,  dry skin, no lesions, rash, petechiae or purpura.   Eyes: PERRLA, EOMs intact,  Conjunctiva pink with no redness or exudates.    HENT: Normocephalic, atraumatic. Nares patent   Neck: Supple. Trachea at midline.   Pulmonary: Diminished breath sounds bilaterally.  Patient is on 2 L nasal cannula oxygen  Cardiac: Regular rate and rhythm, no rubs, murmurs, or gallops. No JVD,   Abdomen: Abdomen is soft, nontender, and nondistended.  No palpable organomegaly.  No rebound or guarding.  No CVA tenderness. Nonsurgical abdomen  Genitourinary: Exam deferred.  Musculoskeletal: no edema, pain, cyanosis, or deformity in extremities. Pulses full and equal.   Neurological:  Cranial nerves are grossly intact, grossly normal sensation, no weakness, no focal findings  identified.    __________________________________________________________  MEDICAL DECISION MAKING:    Patient was seen and examined. Differential diagnosis for the diagnosis for patient's shortness of breath includes fluid overload, infection such as flu or COVID, pneumonia.  Patient has been having worsening shortness of breath.  Overall, it does sound like it is likely fluid overload in nature as he is having associated orthopnea.  Workup was initiated.  Patient was not in hypercapnic respiratory failure.  He did have a BNP elevation 852 compared to his prior of 102.  The patient troponins were negative.  Viral swabs are negative.  His chest x-ray was showing that there is bibasilar pneumonia present.  Patient at this point was started on broad-spectrum antibiotics.  Patient case discussed with IMS for admission as he likely also has a component of possible fluid overload with his BNP elevation.  Patient was admitted at this time.    Chronic Medical Conditions Significantly Affecting Care: End-stage renal disease, COPD on baseline oxygen, hypothyroidism    External Records Reviewed: I reviewed recent and relevant outside records including: Patient's cardiology note from December 6, 2023    Owen David  Emergency Medicine               [place] Coma Scale Score: 15                     Patient History   Past Medical History:   Diagnosis Date    Abnormal level of blood mineral 02/22/2021    Abnormal level of blood mineral    Acute diastolic (congestive) heart failure (CMS/[facility]) 06/18/2020    Acute diastolic congestive heart failure    Acute kidney failure, unspecified (CMS/[facility]) 06/16/2020    ALMAS (acute kidney injury)    Acute respiratory failure with hypoxia (CMS/[facility]) 03/11/2020    Acute respiratory failure with hypoxia    Cellulitis of left toe 08/20/2020    Cellulitis of great toe of left foot    Chronic obstructive pulmonary disease, unspecified (CMS/[facility]) 09/21/2022    COPD, severe    Chronic respiratory failure  with hypoxia (CMS/[facility]) 09/21/2023    Cor pulmonale (chronic) (CMS/[facility]) 01/03/2022    Cor pulmonale, chronic    Encounter for screening for malignant neoplasm of colon 10/19/2022    Colon cancer screening    Infective pericarditis 06/16/2020    Acute viral pericarditis    Iron deficiency anemia secondary to blood loss (chronic) 11/19/2020    Iron deficiency anemia due to chronic blood loss    Nicotine dependence, unspecified, uncomplicated 01/14/2020    Needs smoking cessation education    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    Nontoxic multinodular goiter    Obstructive sleep apnea (adult) (pediatric) 03/11/2020    LEO and COPD overlap syndrome    Other allergic rhinitis 02/23/2021    Other allergic rhinitis    Other fecal abnormalities 07/30/2021    Positive occult stool blood test    Other hypersomnia 01/23/2020    Excessive daytime sleepiness    Other hypersomnia 01/23/2020    Daytime hypersomnolence    Other ill-defined heart diseases 04/27/2020    Grade I diastolic dysfunction    Other pericardial effusion (noninflammatory) 06/18/2020    Pericardial effusion without cardiac tamponade    Other specified personal risk factors, not elsewhere classified 07/30/2020    At risk for amiodarone toxicity with long term use    Other specified symptoms and signs involving the circulatory and respiratory systems 10/10/2019    Bruit of left carotid artery    Personal history of diseases of the skin and subcutaneous tissue 11/19/2020    History of psoriasis    Personal history of nicotine dependence 08/18/2021    History of tobacco use disorder    Personal history of other diseases of the digestive system     History of esophageal reflux    Personal history of other diseases of the nervous system and sense organs     History of obstructive sleep apnea    Personal history of other endocrine, nutritional and metabolic disease 11/19/2020    History of vitamin D deficiency    Personal history of other endocrine, nutritional  and metabolic disease 10/14/2020    History of hyperlipidemia    Personal history of other endocrine, nutritional and metabolic disease 08/20/2020    History of hyperthyroidism    Personal history of other infectious and parasitic diseases 07/06/2021    History of Clostridioides difficile colitis    Personal history of other infectious and parasitic diseases 02/04/2021    History of scabies    Personal history of other specified conditions 04/12/2016    History of snoring    Personal history of other specified conditions 04/07/2016    History of dizziness    Personal history of transient ischemic attack (TIA), and cerebral infarction without residual deficits     History of stroke    Pleural effusion, not elsewhere classified 07/30/2020    Bilateral pleural effusion    Pleural effusion, not elsewhere classified 03/11/2020    Pleural effusion, left    Pneumonia due to Streptococcus pneumoniae (CMS/HCC) 03/11/2020    Pneumonia of both lower lobes due to Streptococcus pneumoniae    Post-traumatic stress disorder, unspecified     PTSD (post-traumatic stress disorder)    Rash and other nonspecific skin eruption 03/25/2021    Rash    Secondary polycythemia 01/23/2020    Polycythemia secondary to smoking    Tinea unguium 08/25/2020    Onychomycosis of toenail     Past Surgical History:   Procedure Laterality Date    APPENDECTOMY  04/07/2016    Appendectomy    IR CVC TUNNELED  6/28/2023    IR CVC TUNNELED 6/28/2023 POR ANGIO     Family History   Family history unknown: Yes     Social History     Tobacco Use    Smoking status: Former     Types: Cigarettes    Smokeless tobacco: Never   Vaping Use    Vaping Use: Never used   Substance Use Topics    Alcohol use: Not Currently    Drug use: Never       Physical Exam   ED Triage Vitals [03/05/24 0913]   Temperature Heart Rate Respirations BP   36.7 °C (98 °F) 81 20 (!) 181/80      Pulse Ox Temp src Heart Rate Source Patient Position   94 % -- -- --      BP Location FiO2 (%)     --  --       Physical Exam    ED Course & [facility]   ED Course as of 03/07/24 0950   e Mar 05, 2024   0957 Patient twelve-lead EKG inter by myself shows sinus rhythm, ventricular rate 80, normal parable, normal axis, normal QRS duration, normal QT, nonspecific T wave changes anterior leads, no STEMI. [WJ]   1054 BNP(!): 852  BNP is markedly elevated when compared to prior of 100 [WJ]   1055 XR chest 1 view  My independent interpretation prior to radiology read is that there is vascular congestion in addition to likely pleural effusions and bilateral [WJ]   1151 Sepsis recognized at this time, as the patient has chest x-ray results concerning for a pneumonia.  Blood cultures into be acquired pneumonia antibiotics were ordered.  Given new oxygen requirement, patient will be admitted. [WJ]      ED Course User Index  [WJ] Owen Hernandez DO         Diagnoses as of 03/07/24 0950   Community acquired bacterial pneumonia   Acute respiratory failure with hypoxia (CMS/HCC)       Medical Decision Making      Procedure    Performed by: Owen Hernandez DO  Authorized by: Owen Hernandez DO  Cardiac Indications: orthopnea dyspnea              Respiratory Indications: shortness of breath  Procedure: Cardiac Ultrasound    Findings:   Views: parasternal long, parasternal short, apical four and subxiphoid  The pericardial space was visualized and was NEGATIVE for a significant pericardial effusion.  Activity: Ventricular contractions were visualized.  LV: LV systolic function was NORMAL.  RV: RV size was NORMAL.    Impression:  Cardiac: The focused cardiac ultrasound exam was NORMAL.  Procedure: Thoracic Ultrasound    Findings:  R Lung Sliding: The RIGHT chest was evaluated and LUNG SLIDING was visualized.  L Lung Sliding: The LEFT chest was evaluated and LUNG SLIDING was visualized.  R Effusion: The RIGHT chest was evaluated and there was a PLEURAL EFFUSION.  L Effusion: The LEFT chest was evaluated and there was a PLEURAL  EFFUSION.            Impression:  Thorax: The focused thoracic ultrasound exam had ABNORMAL findings as specified.           Owen Hernandez,   03/07/24 0952

## 2024-03-05 NOTE — PROGRESS NOTES
Renard Ward is a 73 y.o. male admitted for Community acquired bacterial pneumonia. Pharmacy reviewed the patient's aohmg-di-wrhicfmps medications and allergies for accuracy.    The list below reflects the PTA list prior to pharmacy medication history. A summary a changes to the PTA medication list has been listed below. Please review each medication in order reconciliation for additional clarification and justification.    Source of information:    Medications added:   CARVEDILOL 6.25 MG  LOSARTAN 100 MG---NOT STARTED    Medications modified:    Medications to be removed:    Medications of concern:      Prior to Admission Medications   Prescriptions Last Dose Informant Patient Reported? Taking?   Eliquis 5 mg tablet   Yes No   Sig: Take 1 tablet (5 mg) by mouth 2 times a day.   Mary Grace-Darryl Rx 1- mg-mg-mcg tablet   Yes No   Sig: Take 1 tablet by mouth once daily.   atorvastatin (Lipitor) 40 mg tablet   No No   Sig: Take 1 tablet (40 mg) by mouth once daily at bedtime.   cyanocobalamin, vitamin B-12, (Vitamin B-12) 1,000 mcg tablet extended release   No No   Sig: Take 1 tablet (1,000 mcg) by mouth once daily.   ergocalciferol (Vitamin D-2) 1.25 MG (34450 UT) capsule   No No   Sig: Take 1 capsule (1,250 mcg) by mouth 1 (one) time per week.   famotidine (Pepcid) 40 mg tablet   No No   Sig: Take 1 tablet (40 mg) by mouth 2 times a day.   fluticasone-umeclidin-vilanter (Trelegy Ellipta) 100-62.5-25 mcg blister with device   No No   Sig: Inhale 1 puff once daily.   methIMAzole (Tapazole) 5 mg tablet   No No   Sig: Take 1 tablet (5 mg) by mouth once daily.   montelukast (Singulair) 10 mg tablet   No No   Sig: Take 1 tablet (10 mg) by mouth once daily at bedtime.   torsemide (Demadex) 20 mg tablet   Yes No   Sig: Take 1 tablet (20 mg) by mouth twice a day.      Facility-Administered Medications: None       Debbie Aleman [facility]

## 2024-03-05 NOTE — H&P
History Of Present Illness:  Renard Ward is a 73 y.o. male with PMHx s/f COPD, ESRD, HTN, and atrial flutter post two ablations presenting with pneumonia.    Presents to the ED due to worsening SOB over the past week. His wife was present and states this has been ongoing for approximately a month. He has been on 2L of home oxygen this past month due to his SOB. Patient states his shortness of breath is present at rest and worsens with exertion. Reports a cough but denies chest pain, fever, chills, nausea, and vomiting. States he has had pneumonia in the past and has received two doses of the vaccine. Of note, he receives home dialysis four times a week and his last session was yesterday. He does have an anaphylactic reaction to penicillins.     ED Course (Summary):   Vitals on presentation: Temp 36.7, pulse 81, resp 20, /80 and SpO2 94  Labs: Cr 5.45, , Absolute neutrophils 6.60   Imaging: Chest X-ray--Bibasilar pneumonia with small bilateral pleural effusions.   Interventions: supplemental oxygen, blood culture ordered, Invanz and levaquin started    ED Course:  ED Course as of 03/05/24 1244   Tue Mar 05, 2024   0957 Patient twelve-lead EKG inter by myself shows sinus rhythm, ventricular rate 80, normal parable, normal axis, normal QRS duration, normal QT, nonspecific T wave changes anterior leads, no STEMI. [WJ]   1054 BNP(!): 852  BNP is markedly elevated when compared to prior of 100 [WJ]   1055 XR chest 1 view  My independent interpretation prior to radiology read is that there is vascular congestion in addition to likely pleural effusions and bilateral [WJ]   1151 Sepsis recognized at this time, as the patient has chest x-ray results concerning for a pneumonia.  Blood cultures into be acquired pneumonia antibiotics were ordered.  Given new oxygen requirement, patient will be admitted. [WJ]      ED Course User Index  [WJ] Owen Hernandez DO         Diagnoses as of 03/05/24 1244   [facility], later [facility]  acquired bacterial pneumonia   Acute respiratory failure with hypoxia (CMS/HCC)     Relevant Results  Results for orders placed or performed during the hospital encounter of 03/05/24 (from the past 24 hour(s))   CBC and Auto Differential   Result Value Ref Range    WBC 9.3 4.4 - 11.3 x10*3/uL    nRBC 0.0 0.0 - 0.0 /100 WBCs    RBC 3.49 (L) 4.50 - 5.90 x10*6/uL    Hemoglobin 10.5 (L) 13.5 - 17.5 g/dL    Hematocrit 33.3 (L) 41.0 - 52.0 %    MCV 95 80 - 100 fL    MCH 30.1 26.0 - 34.0 pg    MCHC 31.5 (L) 32.0 - 36.0 g/dL    RDW 16.1 (H) 11.5 - 14.5 %    Platelets 194 150 - 450 x10*3/uL    Neutrophils % 70.8 40.0 - 80.0 %    Immature Granulocytes %, Automated 0.4 0.0 - 0.9 %    Lymphocytes % 10.8 13.0 - 44.0 %    Monocytes % 7.8 2.0 - 10.0 %    Eosinophils % 9.5 0.0 - 6.0 %    Basophils % 0.7 0.0 - 2.0 %    Neutrophils Absolute 6.60 (H) 1.60 - 5.50 x10*3/uL    Immature Granulocytes Absolute, Automated 0.04 0.00 - 0.50 x10*3/uL    Lymphocytes Absolute 1.01 0.80 - 3.00 x10*3/uL    Monocytes Absolute 0.73 0.05 - 0.80 x10*3/uL    Eosinophils Absolute 0.89 (H) 0.00 - 0.40 x10*3/uL    Basophils Absolute 0.07 0.00 - 0.10 x10*3/uL   Protime-INR   Result Value Ref Range    Protime 16.9 (H) 9.8 - 12.8 seconds    INR 1.5 (H) 0.9 - 1.1   B-Type Natriuretic Peptide   Result Value Ref Range     (H) 0 - 99 pg/mL   Blood Gas Venous Full Panel   Result Value Ref Range    POCT pH, Venous 7.33 7.33 - 7.43 pH    POCT pCO2, Venous 64 (H) 41 - 51 mm Hg    POCT pO2, Venous 33 (L) 35 - 45 mm Hg    POCT SO2, Venous 44 (L) 45 - 75 %    POCT Oxy Hemoglobin, Venous 43.0 (L) 45.0 - 75.0 %    POCT Hematocrit Calculated, Venous 32.0 (L) 41.0 - 52.0 %    POCT Sodium, Venous 138 136 - 145 mmol/L    POCT Potassium, Venous 5.1 3.5 - 5.3 mmol/L    POCT Chloride, Venous 101 98 - 107 mmol/L    POCT Ionized Calicum, Venous 1.19 1.10 - 1.33 mmol/L    POCT Glucose, Venous 83 74 - 99 mg/dL    POCT Lactate, Venous 1.2 0.4 - 2.0 mmol/L    POCT Base Excess,  Venous 6.2 (H) -2.0 - 3.0 mmol/L    POCT HCO3 Calculated, Venous 33.7 (H) 22.0 - 26.0 mmol/L    POCT Hemoglobin, Venous 10.5 (L) 13.5 - 17.5 g/dL    POCT Anion Gap, Venous 8.0 (L) 10.0 - 25.0 mmol/L    Patient Temperature 37.0 degrees Celsius    FiO2 40 %   Troponin I, High Sensitivity, Initial   Result Value Ref Range    Troponin I, High Sensitivity 19 0 - 20 ng/L   Sars-CoV-2 and Influenza A/B PCR   Result Value Ref Range    Flu A Result Not Detected Not Detected    Flu B Result Not Detected Not Detected    Coronavirus 2019, PCR Not Detected Not Detected   RSV PCR   Result Value Ref Range    RSV PCR Not Detected Not Detected   Comprehensive Metabolic Panel   Result Value Ref Range    Glucose 86 74 - 99 mg/dL    Sodium 139 136 - 145 mmol/L    Potassium 5.0 3.5 - 5.3 mmol/L    Chloride 102 98 - 107 mmol/L    Bicarbonate 30 21 - 32 mmol/L    Anion Gap 12 10 - 20 mmol/L    Urea Nitrogen 37 (H) 6 - 23 mg/dL    Creatinine 5.45 (H) 0.50 - 1.30 mg/dL    eGFR 10 (L) >60 mL/min/1.73m*2    Calcium 8.9 8.6 - 10.3 mg/dL    Albumin 3.7 3.4 - 5.0 g/dL    Alkaline Phosphatase 56 33 - 136 U/L    Total Protein 6.0 (L) 6.4 - 8.2 g/dL    AST 9 9 - 39 U/L    Bilirubin, Total 0.4 0.0 - 1.2 mg/dL    ALT 15 10 - 52 U/L   Magnesium   Result Value Ref Range    Magnesium 1.60 1.60 - 2.40 mg/dL   Troponin, High Sensitivity, 1 Hour   Result Value Ref Range    Troponin I, High Sensitivity 20 0 - 20 ng/L      XR chest 1 view    Result Date: 3/5/2024  Interpreted By:  Kamila Kelly,   08/29/2023   STUDY: XR CHEST 1 VIEW;  3/5/2024 10:49 am   INDICATION: Signs/Symptoms:SOB.   COMPARISON: 08/29/2023   ACCESSION NUMBER(S): [id number]   ORDERING CLINICIAN: ELIZABETH DANIELLE   FINDINGS: CARDIOMEDIASTINAL SILHOUETTE: The heart is enlarged. There is deviation of the trachea to the right with no compromise of the lumen likely secondary to an enlarged left lobe of the thyroid with substernal extension. There is a right internal jugular dual-lumen catheter  with the tip in the superior vena cava.   LUNGS: There is bibasilar pneumonia with small bilateral pleural effusions.   ABDOMEN: No remarkable upper abdominal findings.     BONES: No acute osseous changes.       Bibasilar pneumonia with small bilateral pleural effusions.   MACRO: None   Signed by: Kamila Kelly 3/5/2024 10:58 AM Dictation workstation:   [id number]     Scheduled medications:  ertapenem, 750 mg, intravenous, Once  levoFLOXacin, 750 mg, intravenous, Once      Continuous medications:     PRN medications:  PRN medications: oxygen      Past Medical History  He has a past medical history of Abnormal level of blood mineral (02/22/2021), Acute diastolic (congestive) heart failure (CMS/HCC) (06/18/2020), Acute kidney failure, unspecified (CMS/[facility]) (06/16/2020), Acute respiratory failure with hypoxia (CMS/[facility]) (03/11/2020), Cellulitis of left toe (08/20/2020), Chronic obstructive pulmonary disease, unspecified (CMS/[facility]) (09/21/2022), Chronic respiratory failure with hypoxia (CMS/[facility]) (09/21/2023), Cor pulmonale (chronic) (CMS/[facility]) (01/03/2022), Encounter for screening for malignant neoplasm of colon (10/19/2022), Infective pericarditis (06/16/2020), 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), Nicotine dependence, unspecified, uncomplicated (01/14/2020), Nontoxic multinodular goiter (11/19/2020), Obstructive sleep apnea (adult) (pediatric) (03/11/2020), Other allergic rhinitis (02/23/2021), Other fecal abnormalities (07/30/2021), Other hypersomnia (01/23/2020), Other hypersomnia (01/23/2020), Other ill-defined heart diseases (04/27/2020), Other pericardial effusion (noninflammatory) (06/18/2020), Other specified personal risk factors, not elsewhere classified (07/30/2020), Other specified symptoms and signs involving the circulatory and respiratory systems (10/10/2019), Personal history of diseases of the skin and subcutaneous tissue (11/19/2020), Personal history of nicotine  dependence (08/18/2021), Personal history of other diseases of the digestive system, Personal history of other diseases of the nervous system and sense organs, Personal history of other endocrine, nutritional and metabolic disease (11/19/2020), Personal history of other endocrine, nutritional and metabolic disease (10/14/2020), Personal history of other endocrine, nutritional and metabolic disease (08/20/2020), Personal history of other infectious and parasitic diseases (07/06/2021), Personal history of other infectious and parasitic diseases (02/04/2021), Personal history of other specified conditions (04/12/2016), Personal history of other specified conditions (04/07/2016), Personal history of transient ischemic attack (TIA), and cerebral infarction without residual deficits, Pleural effusion, not elsewhere classified (07/30/2020), Pleural effusion, not elsewhere classified (03/11/2020), Pneumonia due to Streptococcus pneumoniae (CMS/[facility]) (03/11/2020), Post-traumatic stress disorder, unspecified, Rash and other nonspecific skin eruption (03/25/2021), Secondary polycythemia (01/23/2020), and Tinea unguium (08/25/2020).    Surgical History  He has a past surgical history that includes Appendectomy (04/07/2016) and IR CVC tunneled (6/28/2023).     Social History  He reports that he has quit smoking. His smoking use included cigarettes. He has never used smokeless tobacco. He reports that he does not currently use alcohol. He reports that he does not use drugs.    Family History  Family History   Family history unknown: Yes        Allergies  Penicillins    Code Status  No Order     Review of Systems   Constitutional:  Positive for fatigue. Negative for fever.   HENT:  Negative for trouble swallowing.    Respiratory:  Positive for apnea, cough and shortness of breath. Negative for choking and chest tightness.    Cardiovascular:  Negative for chest pain and leg swelling.   Gastrointestinal:  Negative for diarrhea and  nausea.   Neurological:  Positive for weakness. Negative for dizziness.   Psychiatric/Behavioral:  Negative for agitation, behavioral problems and confusion.        Last Recorded Vitals  /75   Pulse 71   Temp 36.7 °C (98 °F)   Resp 20   Wt 79.4 kg (175 lb)   SpO2 97%      Physical Exam  Constitutional:       General: He is not in acute distress.     Appearance: He is ill-appearing.   HENT:      Head: Normocephalic and atraumatic.      Mouth/Throat:      Mouth: Mucous membranes are dry.   Eyes:      Extraocular Movements: Extraocular movements intact.   Cardiovascular:      Rate and Rhythm: Normal rate and regular rhythm.   Pulmonary:      Breath sounds: Decreased air movement present. Examination of the right-lower field reveals decreased breath sounds. Examination of the left-lower field reveals decreased breath sounds. Decreased breath sounds present.   Abdominal:      General: Abdomen is flat.      Palpations: Abdomen is soft.   Musculoskeletal:      Right lower leg: No edema.      Left lower leg: No edema.   Skin:     General: Skin is dry.   Neurological:      General: No focal deficit present.      Mental Status: He is alert and oriented to person, place, and time.   Psychiatric:         Mood and Affect: Mood normal.         Behavior: Behavior normal.         Assessment/Plan   Principal Problem:    Community acquired bacterial pneumonia  Active Problems:    CAP (community acquired pneumonia)        73 y.o. male with PMHx s/f COPD, ESRD, HTN, and atrial flutter post two ablations presenting with community acquired pneumonia.      Acute Hypoxemic Respiratory Failure 2/2 Community Acquired Pneumonia  -continue Levaquin (anaphylaxis to PCNs)   -Wean as able     COPD  -not overtly in exacerbation but is at risk with PNA   -Hold off on steroids for now.     End Stage Renal Disease  -neph consulted   -home meds     Hypertension   -Continue home regimen    History of Atrial Flutter  -EKG completed and  patient in sinus rhythm  -Continue Eliquis and HI statin         Preeti Bermudez    I have independently examined and interviewed patient and have reviewed patients chart. I have addended the assessment and plan with my recommendations.    Virgilio Watson MD PhD

## 2024-03-05 NOTE — CARE PLAN
Problem: Pain - Adult  Goal: Verbalizes/displays adequate comfort level or baseline comfort level  Outcome: Progressing     Problem: Safety - Adult  Goal: Free from fall injury  Outcome: Progressing     Problem: Discharge Planning  Goal: Discharge to home or other facility with appropriate resources  Outcome: Progressing     Problem: Chronic Conditions and Co-morbidities  Goal: Patient's chronic conditions and co-morbidity symptoms are monitored and maintained or improved  Outcome: Progressing     Problem: Respiratory  Goal: Clear secretions with interventions this shift  Outcome: Progressing  Goal: Minimize anxiety/maximize coping throughout shift  Outcome: Progressing  Goal: Minimal/no exertional discomfort or dyspnea this shift  Outcome: Progressing  Goal: No signs of respiratory distress (eg. Use of accessory muscles. Peds grunting)  Outcome: Progressing  Goal: Patent airway maintained this shift  Outcome: Progressing  Goal: Tolerate mechanical ventilation evidenced by VS/agitation level this shift  Outcome: Progressing  Goal: Tolerate pulmonary toileting this shift  Outcome: Progressing  Goal: Verbalize decreased shortness of breath this shift  Outcome: Progressing  Goal: Wean oxygen to maintain O2 saturation per order/standard this shift  Outcome: Progressing  Goal: Increase self care and/or family involvement in next 24 hours  Outcome: Progressing     Problem: Pain  Goal: My pain/discomfort is manageable  Outcome: Progressing     Problem: Safety  Goal: Patient will be injury free during hospitalization  Outcome: Progressing  Goal: I will remain free of falls  Outcome: Progressing     Problem: Daily Care  Goal: Daily care needs are met  Outcome: Progressing     Problem: Psychosocial Needs  Goal: Demonstrates ability to cope with hospitalization/illness  Outcome: Progressing  Goal: Collaborate with me, my family, and caregiver to identify my specific goals  Outcome: Progressing     Problem: Discharge  Barriers  Goal: My discharge needs are met  Outcome: Progressing   The patient's goals for the shift include      The clinical goals for the shift include pox > 93%

## 2024-03-06 ENCOUNTER — APPOINTMENT (OUTPATIENT)
Dept: DIALYSIS | Facility: HOSPITAL | Age: 74
End: 2024-03-06
Payer: MEDICARE

## 2024-03-06 PROCEDURE — 5A1D70Z PERFORMANCE OF URINARY FILTRATION, INTERMITTENT, LESS THAN 6 HOURS PER DAY: ICD-10-PCS | Performed by: INTERNAL MEDICINE

## 2024-03-06 PROCEDURE — 99233 SBSQ HOSP IP/OBS HIGH 50: CPT | Performed by: INTERNAL MEDICINE

## 2024-03-06 PROCEDURE — 1200000002 HC GENERAL ROOM WITH TELEMETRY DAILY

## 2024-03-06 PROCEDURE — 8010000001 HC DIALYSIS - HEMODIALYSIS PER DAY

## 2024-03-06 PROCEDURE — 2500000001 HC RX 250 WO HCPCS SELF ADMINISTERED DRUGS (ALT 637 FOR MEDICARE OP): Performed by: INTERNAL MEDICINE

## 2024-03-06 PROCEDURE — 2500000005 HC RX 250 GENERAL PHARMACY W/O HCPCS: Performed by: INTERNAL MEDICINE

## 2024-03-06 RX ORDER — HEPARIN SODIUM 1000 [USP'U]/ML
1800 INJECTION, SOLUTION INTRAVENOUS; SUBCUTANEOUS
Status: DISCONTINUED | OUTPATIENT
Start: 2024-03-07 | End: 2024-03-07 | Stop reason: HOSPADM

## 2024-03-06 RX ADMIN — METHIMAZOLE 5 MG: 5 TABLET ORAL at 07:34

## 2024-03-06 RX ADMIN — HEPARIN SODIUM 1800 UNITS: 1000 INJECTION INTRAVENOUS; SUBCUTANEOUS at 13:32

## 2024-03-06 RX ADMIN — ACETAMINOPHEN 650 MG: 325 TABLET ORAL at 13:52

## 2024-03-06 RX ADMIN — ATORVASTATIN CALCIUM 40 MG: 40 TABLET, FILM COATED ORAL at 20:02

## 2024-03-06 RX ADMIN — FLUTICASONE FUROATE AND VILANTEROL TRIFENATATE 1 PUFF: 200; 25 POWDER RESPIRATORY (INHALATION) at 16:50

## 2024-03-06 RX ADMIN — MONTELUKAST 10 MG: 10 TABLET, FILM COATED ORAL at 20:02

## 2024-03-06 RX ADMIN — FAMOTIDINE 20 MG: 20 TABLET ORAL at 20:02

## 2024-03-06 RX ADMIN — APIXABAN 5 MG: 5 TABLET, FILM COATED ORAL at 07:34

## 2024-03-06 RX ADMIN — APIXABAN 5 MG: 5 TABLET, FILM COATED ORAL at 20:02

## 2024-03-06 RX ADMIN — TIOTROPIUM BROMIDE INHALATION SPRAY 2 PUFF: 3.12 SPRAY, METERED RESPIRATORY (INHALATION) at 16:50

## 2024-03-06 RX ADMIN — Medication 2 L/MIN: at 13:52

## 2024-03-06 RX ADMIN — HEPARIN SODIUM 1800 UNITS: 1000 INJECTION INTRAVENOUS; SUBCUTANEOUS at 13:33

## 2024-03-06 RX ADMIN — Medication 3 MG: at 20:02

## 2024-03-06 ASSESSMENT — PAIN SCALES - GENERAL
PAINLEVEL_OUTOF10: 5 - MODERATE PAIN
PAINLEVEL_OUTOF10: 0 - NO PAIN
PAINLEVEL_OUTOF10: 3

## 2024-03-06 ASSESSMENT — COGNITIVE AND FUNCTIONAL STATUS - GENERAL
WALKING IN HOSPITAL ROOM: A LITTLE
CLIMB 3 TO 5 STEPS WITH RAILING: A LITTLE
MOBILITY SCORE: 22
DRESSING REGULAR LOWER BODY CLOTHING: A LITTLE
DAILY ACTIVITIY SCORE: 23

## 2024-03-06 ASSESSMENT — ENCOUNTER SYMPTOMS
LIGHT-HEADEDNESS: 0
DIZZINESS: 0
CHILLS: 0
BLOOD IN STOOL: 0
POLYDIPSIA: 0
DIARRHEA: 0
WHEEZING: 1
COLOR CHANGE: 0
SHORTNESS OF BREATH: 1
COUGH: 1
MYALGIAS: 0
FREQUENCY: 0
FATIGUE: 0
AGITATION: 0
HEMATURIA: 0
NAUSEA: 0
DYSURIA: 0
NECK STIFFNESS: 0
FEVER: 0

## 2024-03-06 ASSESSMENT — PAIN - FUNCTIONAL ASSESSMENT
PAIN_FUNCTIONAL_ASSESSMENT: 0-10
PAIN_FUNCTIONAL_ASSESSMENT: 0-10
PAIN_FUNCTIONAL_ASSESSMENT: NO/DENIES PAIN

## 2024-03-06 ASSESSMENT — PAIN DESCRIPTION - LOCATION: LOCATION: HEAD

## 2024-03-06 NOTE — NURSING NOTE
Pt returned to room via bed from dialysis now. Bed locked, in low position, bed alarm on, call light within reach. Pt reoriented to room and call light system, pt verbalizes understanding.

## 2024-03-06 NOTE — CONSULTS
Service:   Service: Anesthesiology     Consult:  Consult requested by (Attending Name): Silvia   Reason: cardiac ablation with possible cardioversion  under general anesthesia     History of Present Illness:   HPI:    GENEVA MCGEE is a 73 year old Male           Allergies:  ·  penicillin : Anaphylaxis, Hives/Urticaria      Assessment:    Anesthesiology Preop Evaluation:  (late entry)  72yo male for Cardiac Ablation with possible Cardioversion under General Anesthesia:    PMH:  HTN, HLD, A fib, A flutter, CHD, pericardial effusion and tamponade 2020, pericardiocentesis, COPD, hx home O2 use,    Pneumonia 7/23 hosp x 2wks, TOBIAS, bronchitis hx, environmental allergies, ARF, ATN, ESRF on HD, BPH, TIA x2 (no residua),     GERD, dysphagia, obesity, L goiter, anemia, coagulopathy INR 1.4  PSH:  AP, thoracentesis, dialysis catheter (required 2 days of postop ventilator support after AP)  Meds:  Methimazole, montelukast, atorvastatin, furosemide, Eliquis, Trelegy ellipta, Albuterol, Ca  Allergic: PCN (anaphylaxis)  SH:  Former smoker, occas EtOH, no drug use    PE:  Airway:  Mallampati 2, airway difficulty not anticipated, poor dentition, neck mobile  lungs CTA bilat  cor: RR no murmurs    Labs: Hct 31, lytes wnl, BUN/Cr 27/6.2  glc 81, INR 1.4  ASA 4  Plan:  Cardiac Ablation under Moderate Sedation; Cardioversion (if indicated) under General Anesthesia  Pt understands risks, benefits and alternatives.  Questions were answered.  Pt agrees.    Selvin Arce MD  Anesthesiology and Perioperative Medicine      Consult Status:  Consult Status    (select all that apply): initial  consult complete, will follow       Electronic Signatures:  Selvin Arce)  (Signed 22-Sep-2023 14:13)   Authored: Service, History of Present Illness, Allergies,  Assessment/Recommendations, Note Completion      Last Updated: 22-Sep-2023 14:13 by Selvin Arce)

## 2024-03-06 NOTE — CARE PLAN
Problem: Pain - Adult  Goal: Verbalizes/displays adequate comfort level or baseline comfort level  Outcome: Progressing   The patient's goals for the shift include  pt will remain free of injury    The clinical goals for the shift include pox > 93%

## 2024-03-06 NOTE — CARE PLAN
The patient's goals for the shift include to remain comfortable during this shift     The clinical goals for the shift include pt will remain free of injury    Over the shift, the patient did not make progress toward the following goals. Barriers to progression include weakness. Recommendations to address these barriers include assistance with ambulation.

## 2024-03-06 NOTE — PROGRESS NOTES
Renard Ward is a 73 y.o. male on day 1 of admission presenting with Community acquired bacterial pneumonia.    Subjective   Renard Ward is a 73 y.o. male with PMHx s/f COPD, ESRD, HTN, and atrial flutter post two ablations presenting with pneumonia.     Presents to the ED due to worsening SOB over the past week. His wife was present and states this has been ongoing for approximately a month. He has been on 2L of home oxygen this past month due to his SOB. Patient states his shortness of breath is present at rest and worsens with exertion. Reports a cough but denies chest pain, fever, chills, nausea, and vomiting. States he has had pneumonia in the past and has received two doses of the vaccine. Of note, he receives home dialysis four times a week and his last session was yesterday. He does have an anaphylactic reaction to penicillins.      ED Course (Summary):   Vitals on presentation: Temp 36.7, pulse 81, resp 20, /80 and SpO2 94  Labs: Cr 5.45, , Absolute neutrophils 6.60   Imaging: Chest X-ray--Bibasilar pneumonia with small bilateral pleural effusions.   Interventions: supplemental oxygen, blood culture ordered, Invanz and levaquin started               Objective     A Comprehensive greater than 10 system review of systems was carried out.  Pertinent positives and negatives are noted above.  Otherwise negative for contributory information.     Constitutional:       General: He is not in acute distress.  HENT:      Head: Normocephalic and atraumatic.      Mouth/Throat:   Eyes:      Extraocular Movements: Extraocular movements intact.   Cardiovascular:      Rate and Rhythm: Normal rate and regular rhythm.   Pulmonary:      Breath sounds: Decreased air movement present. Examination of the right-lower field reveals decreased breath sounds. Examination of the left-lower field reveals decreased breath sounds. Decreased breath sounds present.   Abdominal:      General: Abdomen is flat.       "Palpations: Abdomen is soft.   Musculoskeletal:      Right lower leg: No edema.      Left lower leg: No edema.   Skin:     General: Skin is dry.   Neurological:      General: No focal deficit present.      Mental Status: He is alert and oriented to person, place, and time.   Psychiatric:         Mood and Affect: Mood normal.         Behavior: Behavior normal.     Last Recorded Vitals  Blood pressure 144/68, pulse 79, temperature 36.3 °C (97.3 °F), temperature source Temporal, resp. rate 17, height 1.753 m (5' 9.02\"), weight 79.4 kg (175 lb), SpO2 93 %.  Intake/Output last 3 Shifts:  I/O last 3 completed shifts:  In: 571 (7.2 mL/kg) [P.O.:371; IV Piggyback:200]  Out: 300 (3.8 mL/kg) [Urine:300 (0.1 mL/kg/hr)]  Weight: 79.4 kg     Relevant Results                             Assessment/Plan   Acute Hypoxemic Respiratory Failure 2/2 Community Acquired Pneumonia  -continue Levaquin (anaphylaxis to PCNs) D2 (renally dosed)   -Wean as able      COPD  -not overtly in exacerbation but is at risk with PNA   -Hold off on steroids for now.      End Stage Renal Disease  -neph consulted   -home meds      Hypertension   -Continue home regimen     History of Atrial Flutter  -EKG completed and patient in sinus rhythm  -Continue Eliquis and HI statin      Virgilio Watson MD PhD      "

## 2024-03-06 NOTE — PROGRESS NOTES
03/06/24 1536   Discharge Planning   Living Arrangements Spouse/significant other   Support Systems Spouse/significant other   Assistance Needed none   Type of Residence Private residence   Home or Post Acute Services None   Patient expects to be discharged to: home   Does the patient need discharge transport arranged? No     Discharge planning assessment completed with patient and spouse at bedside. Patient confirmed his PCP is Dr Bassett. Patient is currently on supplemental oxygen, which is new for him. Patient was on home O2 for some time at home, however he no longer qualified and equipment was returned to the supplying company. Patient states that over the last couple of days he realized he was hypoxic and found an old [employer] machine that really is not in working order, but patient tried to use to help himself. If unable to wean O2, patient will need a home O2 eval before discharging home. TCC following.

## 2024-03-06 NOTE — PRE-PROCEDURE NOTE
Report from Sending RN:    Report From: YADIEL COURTNEY  Recent Surgery of Procedure: No  Baseline Level of Consciousness (LOC): X4  Oxygen Use: Yes  Type: NASAL  Diabetic: Yes  Last BP Med Given Day of Dialysis: SEE MAR  Last Pain Med Given: SEE MAR  Lab Tests to be Obtained with Dialysis: No  Blood Transfusion to be Given During Dialysis: No  Available IV Access: Yes  Medications to be Administered During Dialysis: No  Continuous IV Infusion Running: No  Restraints on Currently or in the Last 24 Hours: No  Hand-Off Communication: ADIS SARAVIA

## 2024-03-06 NOTE — CONSULTS
[facility]     Nephrology Consultation Note      Reason for consultation:  ESRD     Chief Complaint:   Dyspnea    History of Presenting Illness      Patient is a  73 y.o.  male  with past medical history of  end-stage renal disease on home hemodialysis modality 4 days a week.  History of COPD chronic respiratory failure atrial fibrillation on chronic anticoagulation.  Patient was admitted with worsening shortness of breath.  Patient is a vaccine not believer  He has been compliant with his home dialysis regimen.  Denied any swelling and has been hovering around his dry weight.  Patient has a known surgically created fistula which is maturing and is not ready for unsupervised use.  Other past relevant history includes congestive heart failure, atrial fibrillation coronary artery disease COPD.  Patient denies any fevers.  Has had some dry cough with minimal expectoration.  Did have some orthopnea symptoms.  Underwent dialysis today with 3 L removal.  Typical dry weight for the patient is close to 176 pounds here postdialysis patient weighed 170.4 pounds.  Volume removal was not associated with any significant improvement in symptoms    Past Medical/Surgical History      Past Medical History:   Diagnosis Date    Abnormal level of blood mineral 02/22/2021    Abnormal level of blood mineral    Acute diastolic (congestive) heart failure (CMS/[facility]) 06/18/2020    Acute diastolic congestive heart failure    Acute kidney failure, unspecified (CMS/[facility]) 06/16/2020    ALMAS (acute kidney injury)    Acute respiratory failure with hypoxia (CMS/[facility]) 03/11/2020    Acute respiratory failure with hypoxia    Cellulitis of left toe 08/20/2020    Cellulitis of great toe of left foot    Chronic obstructive pulmonary disease, unspecified (CMS/[facility]) 09/21/2022    COPD, severe    Chronic respiratory failure with hypoxia (CMS/[facility]) 09/21/2023    Cor pulmonale (chronic) (CMS/[facility]) 01/03/2022    Cor pulmonale, chronic     Encounter for screening for malignant neoplasm of colon 10/19/2022    Colon cancer screening    Infective pericarditis 06/16/2020    Acute viral pericarditis    Iron deficiency anemia secondary to blood loss (chronic) 11/19/2020    Iron deficiency anemia due to chronic blood loss    Nicotine dependence, unspecified, uncomplicated 01/14/2020    Needs smoking cessation education    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    Nontoxic multinodular goiter    Obstructive sleep apnea (adult) (pediatric) 03/11/2020    LEO and COPD overlap syndrome    Other allergic rhinitis 02/23/2021    Other allergic rhinitis    Other fecal abnormalities 07/30/2021    Positive occult stool blood test    Other hypersomnia 01/23/2020    Excessive daytime sleepiness    Other hypersomnia 01/23/2020    Daytime hypersomnolence    Other ill-defined heart diseases 04/27/2020    Grade I diastolic dysfunction    Other pericardial effusion (noninflammatory) 06/18/2020    Pericardial effusion without cardiac tamponade    Other specified personal risk factors, not elsewhere classified 07/30/2020    At risk for amiodarone toxicity with long term use    Other specified symptoms and signs involving the circulatory and respiratory systems 10/10/2019    Bruit of left carotid artery    Personal history of diseases of the skin and subcutaneous tissue 11/19/2020    History of psoriasis    Personal history of nicotine dependence 08/18/2021    History of tobacco use disorder    Personal history of other diseases of the digestive system     History of esophageal reflux    Personal history of other diseases of the nervous system and sense organs     History of obstructive sleep apnea    Personal history of other endocrine, nutritional and metabolic disease 11/19/2020    History of vitamin D deficiency    Personal history of other endocrine, nutritional and metabolic disease 10/14/2020    History of hyperlipidemia    Personal history of other endocrine,  nutritional and metabolic disease 08/20/2020    History of hyperthyroidism    Personal history of other infectious and parasitic diseases 07/06/2021    History of Clostridioides difficile colitis    Personal history of other infectious and parasitic diseases 02/04/2021    History of scabies    Personal history of other specified conditions 04/12/2016    History of snoring    Personal history of other specified conditions 04/07/2016    History of dizziness    Personal history of transient ischemic attack (TIA), and cerebral infarction without residual deficits     History of stroke    Pleural effusion, not elsewhere classified 07/30/2020    Bilateral pleural effusion    Pleural effusion, not elsewhere classified 03/11/2020    Pleural effusion, left    Pneumonia due to Streptococcus pneumoniae (CMS/HCC) 03/11/2020    Pneumonia of both lower lobes due to Streptococcus pneumoniae    Post-traumatic stress disorder, unspecified     PTSD (post-traumatic stress disorder)    Rash and other nonspecific skin eruption 03/25/2021    Rash    Secondary polycythemia 01/23/2020    Polycythemia secondary to smoking    Tinea unguium 08/25/2020    Onychomycosis of toenail     Past Surgical History:   Procedure Laterality Date    APPENDECTOMY  04/07/2016    Appendectomy    IR CVC TUNNELED  6/28/2023    IR CVC TUNNELED 6/28/2023 POR ANGIO         Review of Systems         Review of Systems   Constitutional:  Negative for chills, fatigue and fever.   HENT:  Negative for congestion.    Eyes:  Negative for visual disturbance.   Respiratory:  Positive for cough, shortness of breath and wheezing.    Cardiovascular:  Negative for chest pain and leg swelling.   Gastrointestinal:  Negative for blood in stool, diarrhea and nausea.   Endocrine: Negative for polydipsia and polyuria.   Genitourinary:  Negative for dysuria, frequency, hematuria and urgency.   Musculoskeletal:  Negative for myalgias and neck stiffness.   Skin:  Negative for color  change, pallor and rash.   Allergic/Immunologic: Negative for immunocompromised state.   Neurological:  Negative for dizziness and light-headedness.   Psychiatric/Behavioral:  Negative for agitation.         Allergies     Penicillins      Family History       Negative  for Kidney Disease  Social History      Social History     Socioeconomic History    Marital status:      Spouse name: Not on file    Number of children: Not on file    Years of education: Not on file    Highest education level: Not on file   Occupational History    Not on file   Tobacco Use    Smoking status: Former     Types: Cigarettes    Smokeless tobacco: Never   Vaping Use    Vaping Use: Never used   Substance and Sexual Activity    Alcohol use: Not Currently    Drug use: Never    Sexual activity: Not on file   Other Topics Concern    Not on file   Social History Narrative    Not on file     Social Determinants of Health     Financial Resource Strain: Low Risk  (3/5/2024)    Overall Financial Resource Strain (CARDIA)     Difficulty of Paying Living Expenses: Not hard at all   Food Insecurity: Not on file   Transportation Needs: No Transportation Needs (3/5/2024)    PRAPARE - Transportation     Lack of Transportation (Medical): No     Lack of Transportation (Non-Medical): No   Physical Activity: Not on file   Stress: Not on file   Social Connections: Not on file   Intimate Partner Violence: Not on file   Housing Stability: Low Risk  (3/5/2024)    Housing Stability Vital Sign     Unable to Pay for Housing in the Last Year: No     Number of Places Lived in the Last Year: 1     Unstable Housing in the Last Year: No           Medications      Medications Prior to Admission   Medication Sig Dispense Refill Last Dose    atorvastatin (Lipitor) 40 mg tablet Take 1 tablet (40 mg) by mouth once daily at bedtime. 90 tablet 3     carvedilol (Coreg) 6.25 mg tablet Take 1 tablet (6.25 mg) by mouth 2 times a day with meals.       cyanocobalamin, vitamin  B-12, (Vitamin B-12) 1,000 mcg tablet extended release Take 1 tablet (1,000 mcg) by mouth once daily. 90 tablet 0     Eliquis 5 mg tablet Take 1 tablet (5 mg) by mouth 2 times a day.       ergocalciferol (Vitamin D-2) 1.25 MG (26142 UT) capsule Take 1 capsule (1,250 mcg) by mouth 1 (one) time per week. 12 capsule 3     famotidine (Pepcid) 40 mg tablet Take 1 tablet (40 mg) by mouth 2 times a day. 180 tablet 3     fluticasone-umeclidin-vilanter (Trelegy Ellipta) 100-62.5-25 mcg blister with device Inhale 1 puff once daily. 1 each 11     losartan (Cozaar) 100 mg tablet Take 1 tablet (100 mg) by mouth once daily.       methIMAzole (Tapazole) 5 mg tablet Take 1 tablet (5 mg) by mouth once daily. 30 tablet 3     montelukast (Singulair) 10 mg tablet Take 1 tablet (10 mg) by mouth once daily at bedtime. 90 tablet 2     Mary Grace-Darryl Rx 1- mg-mg-mcg tablet Take 1 tablet by mouth once daily.       torsemide (Demadex) 20 mg tablet Take 1 tablet (20 mg) by mouth twice a day.            Current Medications:     apixaban, 5 mg, oral, BID  atorvastatin, 40 mg, oral, Nightly  famotidine, 20 mg, oral, Nightly  tiotropium, 2 puff, inhalation, Daily   And  fluticasone furoate-vilanteroL, 1 puff, inhalation, Daily  [START ON 3/7/2024] heparin, 1,800 Units, intra-catheter, After Dialysis  [START ON 3/7/2024] heparin, 1,800 Units, intra-catheter, After Dialysis  [START ON 3/7/2024] levoFLOXacin, 500 mg, oral, q48h  melatonin, 3 mg, oral, Nightly  methIMAzole, 5 mg, oral, Daily  montelukast, 10 mg, oral, Nightly      Continuous Infusions:   PRN Meds:PRN medications: acetaminophen **OR** acetaminophen **OR** acetaminophen, oxygen, polyethylene glycol    Physical Exam          Vitals 24HR  Vitals:    03/06/24 0500 03/06/24 0957 03/06/24 1334 03/06/24 1414   BP: 152/74   144/68   BP Location: Right arm   Right arm   Patient Position: Lying   Lying   Pulse: 77 73 76 79   Resp: 18   17   Temp: 36.6 °C (97.9 °F) 36.6 °C (97.9 °F) 36.5 °C  "(97.7 °F) 36.3 °C (97.3 °F)   TempSrc: Temporal Temporal Temporal Temporal   SpO2: 95%   93%   Weight:       Height:  1.753 m (5' 9.02\")             Input / Output:  24 HR:   Intake/Output Summary (Last 24 hours) at 3/6/2024 1527  Last data filed at 3/6/2024 1354  Gross per 24 hour   Intake 1921 ml   Output 7000 ml   Net -5079 ml     IV Intake: P.O. (mL): 150 mL      Joy:        Physical Exam  Constitutional:       Appearance: Normal appearance. He is not toxic-appearing.   HENT:      Head: Normocephalic and atraumatic.      Right Ear: External ear normal.      Mouth/Throat:      Mouth: Mucous membranes are moist.      Pharynx: Oropharynx is clear.   Eyes:      General: No scleral icterus.     Conjunctiva/sclera: Conjunctivae normal.      Pupils: Pupils are equal, round, and reactive to light.   Neck:      Vascular: No carotid bruit.   Cardiovascular:      Rate and Rhythm: Normal rate and regular rhythm.      Pulses: Normal pulses.      Heart sounds: Normal heart sounds.   Pulmonary:      Effort: Pulmonary effort is normal.      Breath sounds: Wheezing and rales present.   Abdominal:      Palpations: Abdomen is soft.      Tenderness: There is no abdominal tenderness.   Musculoskeletal:         General: No swelling.      Cervical back: No rigidity.      Right lower leg: No edema.      Left lower leg: No edema.   Neurological:      General: No focal deficit present.      Mental Status: He is alert and oriented to person, place, and time. Mental status is at baseline.   Psychiatric:         Mood and Affect: Mood normal.         Behavior: Behavior normal.         Thought Content: Thought content normal.         Judgment: Judgment normal.            Data        Results from last 7 days   Lab Units 03/05/24  1100   SODIUM mmol/L 139   POTASSIUM mmol/L 5.0   CHLORIDE mmol/L 102   CO2 mmol/L 30   BUN mg/dL 37*   CREATININE mg/dL 5.45*   EGFR mL/min/1.73m*2 10*   GLUCOSE mg/dL 86   CALCIUM mg/dL 8.9          " "      Imaging:  Results from last 7 days   Lab Units 03/05/24  1020   WBC AUTO x10*3/uL 9.3   HEMOGLOBIN g/dL 10.5*   HEMATOCRIT % 33.3*   PLATELETS AUTO x10*3/uL 194           )No results found for: \"COLORU\", \"LABPH\", \"GLUCOSEU\", \"UROBILINOGEN\", \"BILIRUBINUR\"         Assessment   73 y.o. male with     End-stage renal disease N18.6  Hypertension I12  Dependence on hemodialysis Z99.2  Acute on chronic hypoxemic  respiratory failure  Atrial fibrillation chronic  Anemia of chronic kidney disease    RECOMMENDATIONS:    Will perform dialysis per Monday Wednesday Friday schedule while inpatient, if patient is discharged we will resume 4 days a week outpatient home hemodialysis.  Defer antibiotic therapy to primary service.  Volume is well-managed at this point.  Hypertension is well-controlled.,  Hemoglobin is at an adequate level  Resume home antihypertensive agents.  We will carefully follow  Thank you for asking us to participate in the management of your patient, please do not hesitate to contact me for any concerns regarding my recommendations as outlined above. I can be easily reached via Perfect Serve     SIGNATURE:  Jean-Paul Duffy MD PATIENT NAME: Renard Ward   DATE: March 6, 2024 MRN: [id number]         [facility]  [phone]        "

## 2024-03-06 NOTE — POST-PROCEDURE NOTE
Report to Receiving RN:    Report To: Mehnaz Lewis  Time Report Called: 0619  Hand-Off Communication: Last bp ,vss pt alert ,3 liters removed  Complications During Treatment: No  Ultrafiltration Treatment: No  Medications Administered During Dialysis: No  Blood Products Administered During Dialysis: No  Labs Sent During Dialysis: No  Heparin Drip Rate Changes: No    Electronic Signatures:    Last Updated: 1:40 PM by ADIS CAMPOS

## 2024-03-07 VITALS
SYSTOLIC BLOOD PRESSURE: 155 MMHG | HEART RATE: 86 BPM | DIASTOLIC BLOOD PRESSURE: 64 MMHG | RESPIRATION RATE: 14 BRPM | BODY MASS INDEX: 25.24 KG/M2 | OXYGEN SATURATION: 90 % | HEIGHT: 69 IN | TEMPERATURE: 98.8 F | WEIGHT: 170.4 LBS

## 2024-03-07 PROBLEM — J18.9 CAP (COMMUNITY ACQUIRED PNEUMONIA): Status: RESOLVED | Noted: 2024-03-05 | Resolved: 2024-03-07

## 2024-03-07 PROBLEM — J15.9 COMMUNITY ACQUIRED BACTERIAL PNEUMONIA: Status: RESOLVED | Noted: 2024-03-05 | Resolved: 2024-03-07

## 2024-03-07 PROCEDURE — 99239 HOSP IP/OBS DSCHRG MGMT >30: CPT | Performed by: INTERNAL MEDICINE

## 2024-03-07 PROCEDURE — 2500000001 HC RX 250 WO HCPCS SELF ADMINISTERED DRUGS (ALT 637 FOR MEDICARE OP): Performed by: INTERNAL MEDICINE

## 2024-03-07 PROCEDURE — 2500000004 HC RX 250 GENERAL PHARMACY W/ HCPCS (ALT 636 FOR OP/ED): Performed by: INTERNAL MEDICINE

## 2024-03-07 PROCEDURE — 94761 N-INVAS EAR/PLS OXIMETRY MLT: CPT

## 2024-03-07 RX ORDER — LEVOFLOXACIN 500 MG/1
500 TABLET, FILM COATED ORAL
Qty: 3 TABLET | Refills: 0 | Status: SHIPPED | OUTPATIENT
Start: 2024-03-09 | End: 2024-03-15

## 2024-03-07 RX ADMIN — ACETAMINOPHEN 650 MG: 325 TABLET ORAL at 11:50

## 2024-03-07 RX ADMIN — METHIMAZOLE 5 MG: 5 TABLET ORAL at 08:49

## 2024-03-07 RX ADMIN — APIXABAN 5 MG: 5 TABLET, FILM COATED ORAL at 08:49

## 2024-03-07 RX ADMIN — LEVOFLOXACIN 500 MG: 500 TABLET, FILM COATED ORAL at 08:49

## 2024-03-07 ASSESSMENT — COGNITIVE AND FUNCTIONAL STATUS - GENERAL
CLIMB 3 TO 5 STEPS WITH RAILING: A LITTLE
WALKING IN HOSPITAL ROOM: A LITTLE
DAILY ACTIVITIY SCORE: 23
DRESSING REGULAR LOWER BODY CLOTHING: A LITTLE
MOBILITY SCORE: 22

## 2024-03-07 ASSESSMENT — PAIN SCALES - GENERAL
PAINLEVEL_OUTOF10: 0 - NO PAIN
PAINLEVEL_OUTOF10: 3
PAINLEVEL_OUTOF10: 0 - NO PAIN

## 2024-03-07 ASSESSMENT — PAIN - FUNCTIONAL ASSESSMENT
PAIN_FUNCTIONAL_ASSESSMENT: 0-10
PAIN_FUNCTIONAL_ASSESSMENT: 0-10

## 2024-03-07 NOTE — PROGRESS NOTES
Patient to be discharged home today. Home O2 eval is done with patient requiring 4L with exertion. Home O2 is being set up through [facility]. Nursing was notified to contact [facility] when patient is discharging.

## 2024-03-07 NOTE — DOCUMENTATION CLARIFICATION NOTE
PATIENT:               GENEVA MCGEE  ACCT #:                  [id number]  MRN:                       [id number]  :                       1950  ADMIT DATE:       3/5/2024 9:11 AM  DISCH DATE:  RESPONDING PROVIDER #:        [id number]          PROVIDER RESPONSE TEXT:    Aspiration PNA    CDI QUERY TEXT:    [facility]_Pneumonia Specificity        Instruction:    Based on your assessment of the patient and the clinical information, please provide the requested documentation by clicking on the appropriate radio button and enter any additional information if prompted.    Question: Please further specify the type of pneumonia being treated    When answering this query, please exercise your independent professional judgment. The fact that a question is being asked, does not imply that any particular answer is desired or expected.    The patient's clinical indicators include:  Clinical Information:  - 74 yo male admitted with AOC hypoxic respiratory failure and pneumonia. PMH includes ESRD on 4 day a wk home HD, CHF, Afib, Aflutter, CAD, COPD on 2l recently at home, pericarditis,  and is an exsmoker.    Clinical Indicators:  - 3/5 CXR:  Bibasilar pneumonia with small bilateral pleural effusions.    Treatment:  Invanz and Levaquin.    Risk Factors: COPD, home HD, allergy to PCN.  Options provided:  -- Aspiration PNA  -- Gram Negative PNA  -- Pseudomonas PNA  -- Viral PNA  -- MRSA PNA  -- Other - I will add my own diagnosis  -- Refer to Clinical Documentation Reviewer    Query created by: Katelyn Gonzalez on 3/7/2024 1:48 PM      Electronically signed by:  SANIA CYR MD 3/7/2024 2:05 PM

## 2024-03-07 NOTE — DISCHARGE SUMMARY
Discharge Diagnosis  Community acquired bacterial pneumonia  ESRD  Acute on chronic hypoxic respiratory failure secondary to pneumonia and fluid overload      This discharge took greater than 35 minutes.    Test Results Pending At Discharge  Pending Labs       Order Current Status    Blood Culture Preliminary result    Blood Culture Preliminary result            Hospital Course   Renard Ward is a 73 y.o. male with PMHx s/f COPD, ESRD, HTN, and atrial flutter post two ablations presenting with pneumonia.     Presents to the ED due to worsening SOB over the past week. His wife was present and states this has been ongoing for approximately a month. He has been on 2L of home oxygen this past month due to his SOB. Patient states his shortness of breath is present at rest and worsens with exertion. Reports a cough but denies chest pain, fever, chills, nausea, and vomiting. States he has had pneumonia in the past and has received two doses of the vaccine. Of note, he receives home dialysis four times a week and his last session was yesterday. He does have an anaphylactic reaction to penicillins.      ED Course (Summary):   Vitals on presentation: Temp 36.7, pulse 81, resp 20, /80 and SpO2 94  Labs: Cr 5.45, , Absolute neutrophils 6.60   Imaging: Chest X-ray--Bibasilar pneumonia with small bilateral pleural effusions.   Interventions: supplemental oxygen, blood culture ordered, Invanz and levaquin started    Patient condition gradually improved with treatment.  Patient felt his back to his baseline and desired going home.  Blood cultures has remained negative.  Cleared by nephrology for discharge.  Will obtain home oxygen evaluation and discharge patient home on oxygen if indicated.  He will continue on Levaquin for 6 more days to complete the course.          Pertinent Physical Exam At Time of Discharge  Physical Exam  Patient is awake and orient, not in apparent distress  Eyes: PERRLA, no conjunctival  congestion  Chest: Bilateral Air entry, no crackles or wheezing  Heart: s1S2 regular, no murmur  Abdomen: Soft, non tender, BS present  Ext:    Home Medications     Medication List      START taking these medications     levoFLOXacin 500 mg tablet; Commonly known as: Levaquin; Take 1 tablet   (500 mg) by mouth every other day for 6 days. Do not start before March 9, 2024.; Start taking on: March 9, 2024     CONTINUE taking these medications     atorvastatin 40 mg tablet; Commonly known as: Lipitor; Take 1 tablet (40   mg) by mouth once daily at bedtime.   carvedilol 6.25 mg tablet; Commonly known as: Coreg   cyanocobalamin (vitamin B-12) 1,000 mcg tablet extended release;   Commonly known as: Vitamin B-12; Take 1 tablet (1,000 mcg) by mouth once   daily.   Eliquis 5 mg tablet; Generic drug: apixaban   ergocalciferol 1.25 MG (45980 UT) capsule; Commonly known as: Vitamin   D-2; Take 1 capsule (1,250 mcg) by mouth 1 (one) time per week.   famotidine 40 mg tablet; Commonly known as: Pepcid; Take 1 tablet (40   mg) by mouth 2 times a day.   losartan 100 mg tablet; Commonly known as: Cozaar   methIMAzole 5 mg tablet; Commonly known as: Tapazole; Take 1 tablet (5   mg) by mouth once daily.   montelukast 10 mg tablet; Commonly known as: Singulair; Take 1 tablet   (10 mg) by mouth once daily at bedtime.   Mary Grace-Darryl Rx 1- mg-mg-mcg tablet; Generic drug: B complex-vitamin   C-folic acid   torsemide 20 mg tablet; Commonly known as: Demadex   Trelegy Ellipta 100-62.5-25 mcg blister with device; Generic drug:   fluticasone-umeclidin-vilanter; Inhale 1 puff once daily.       Outpatient Follow-Up  No follow-ups on file.     Marya Levin MD  3/7/2024  12:34 PM

## 2024-03-07 NOTE — CARE PLAN
Home O2 Evaluation:  COMPLETE    SpO2 on room air at rest:     88%    SpO2 on room air with exertion:     78%    SpO2 on oxygen at rest:     92% WITH 1L OF OXYGEN    SpO2 on oxygen with exertion:     87% WITH 3L OF OXYGEN    SpO2 on 4L/min O2 with exertion:    97%    Ambulation distance:      250ft    Recommended O2 device: NC    Recommended O2 L/min: 4L WITH EXERTION     Recommended FiO2 %: 36

## 2024-03-07 NOTE — NURSING NOTE
Patient discharged per physician order. Patient verbalized understanding of discharge instructions and copy of instructions sent with patient. [facility]yanka at [facility] notified of discharge. IV taken out x1 per policy with catheter tip intact.

## 2024-03-08 ENCOUNTER — PATIENT OUTREACH (OUTPATIENT)
Dept: PRIMARY CARE | Facility: CLINIC | Age: 74
End: 2024-03-08
Payer: MEDICARE

## 2024-03-08 NOTE — NURSING NOTE
PNEUMONIA Follow up Call    The Patient discharged With the following Diagnosis: Pneumonia:        How is your breathing? better   How is your activity? better   How is your cough?  usual amount   Mucus: mucus: usual amount and color   How often Are you using a Quick Relief/ Rescue Inhaler / Nebulizer:   not applicable

## 2024-03-08 NOTE — PROGRESS NOTES
[facility]     Nephrology Consult progress Note      Reason for consultation:  ESRD     Chief Complaint:   Dyspnea    Interval History    Patient is doing  better     ROS     Denies chest pain,  c/o improvement in shortness of breath,   No  Nausea, vomiting , diarrhea, fever or chills.     No change in Past Medical History          Review of Systems                         Vitals 24HR  Vitals:    03/06/24 2100 03/07/24 0100 03/07/24 0411 03/07/24 1315   BP: 151/77 146/79 116/64 155/64   BP Location: Right arm Right arm Right arm Right arm   Patient Position: Lying Lying Lying Lying   Pulse: 83 78 76 86   Resp: 18 18 18 14   Temp: 36.6 °C (97.9 °F) 36.6 °C (97.8 °F) 36.8 °C (98.2 °F) 37.1 °C (98.8 °F)   TempSrc: Temporal Temporal Temporal Temporal   SpO2: 94% 95% 95% 90%   Weight:       Height:               Input / Output:  24 HR:   Intake/Output Summary (Last 24 hours) at 3/7/2024 2028  Last data filed at 3/7/2024 1518  Gross per 24 hour   Intake 840 ml   Output 150 ml   Net 690 ml       IV Intake: P.O. (mL): 480 mL      Joy:        Physical Exam  Constitutional:       Appearance: Normal appearance. He is not toxic-appearing.   HENT:      Head: Normocephalic and atraumatic.      Right Ear: External ear normal.   Eyes:      General: No scleral icterus.     Conjunctiva/sclera: Conjunctivae normal.   Neck:      Vascular: No carotid bruit.   Cardiovascular:      Rate and Rhythm: Normal rate and regular rhythm.      Pulses: Normal pulses.      Heart sounds: Normal heart sounds.   Pulmonary:      Effort: Pulmonary effort is normal.      Breath sounds: Wheezing and rales present.   Abdominal:      Palpations: Abdomen is soft.      Tenderness: There is no abdominal tenderness.   Musculoskeletal:         General: No swelling.      Cervical back: No rigidity.      Right lower leg: No edema.      Left lower leg: No edema.   Neurological:      Mental Status: He is alert and oriented to person, place,  "and time.   Psychiatric:         Mood and Affect: Mood normal.         Behavior: Behavior normal.         Thought Content: Thought content normal.         Judgment: Judgment normal.          Data        Results from last 7 days   Lab Units 03/05/24  1100   SODIUM mmol/L 139   POTASSIUM mmol/L 5.0   CHLORIDE mmol/L 102   CO2 mmol/L 30   BUN mg/dL 37*   CREATININE mg/dL 5.45*   EGFR mL/min/1.73m*2 10*   GLUCOSE mg/dL 86   CALCIUM mg/dL 8.9                  Imaging:  Results from last 7 days   Lab Units 03/05/24  1020   WBC AUTO x10*3/uL 9.3   HEMOGLOBIN g/dL 10.5*   HEMATOCRIT % 33.3*   PLATELETS AUTO x10*3/uL 194             )No results found for: \"COLORU\", \"LABPH\", \"GLUCOSEU\", \"UROBILINOGEN\", \"BILIRUBINUR\"         Assessment   73 y.o. male with     End-stage renal disease N18.6  Hypertension I12  Dependence on hemodialysis Z99.2  Acute on chronic hypoxemic  respiratory failure  Atrial fibrillation chronic  Anemia of chronic kidney disease    RECOMMENDATIONS:    Will perform dialysis per Monday Wednesday Friday schedule while inpatient, if patient is discharged we will resume 4 days a week outpatient home hemodialysis.  Defer antibiotic therapy to primary service.  Volume is well-managed at this point.  Hypertension is well-controlled.,  Hemoglobin is at an adequate level  Resume home antihypertensive agents.  We will carefully follow  Thank you for asking us to participate in the management of your patient, please do not hesitate to contact me for any concerns regarding my recommendations as outlined above. I can be easily reached via Perfect Serve   "

## 2024-03-08 NOTE — PROGRESS NOTES
Discharge Facility:   [facility]   Discharge Diagnosis:   Community acquired bacterial pneumonia  ESRD  Acute on chronic hypoxic respiratory failure secondary to pneumonia and fluid overload  Admission Date:  3/5/24   Discharge Date:   3/7/24    PCP Appointment Date: 4/4/24 -   Specialist Appointment Date:   3/28/24 cardio   Hospital Encounter and Summary: Linked  See discharge assessment below for further details    Engagement  Call Start Time: 1100 (3/8/2024 11:33 AM)    Medications  Medications reviewed with patient/caregiver?: Yes (3/8/2024 11:33 AM)  Is the patient having any side effects they believe may be caused by any medication additions or changes?: No (3/8/2024 11:33 AM)  Does the patient have all medications ordered at discharge?: Yes (3/8/2024 11:33 AM)  Prescription Comments: new script for  levoFLOXacin 500 mg tablet pt states he will  tomorrow (3/8/2024 11:33 AM)  Is the patient taking all medications as directed (includes completed medication regime)?: Yes (3/8/2024 11:33 AM)  Care Management Interventions: Provided patient education (3/8/2024 11:33 AM)  Medication Comments: Pt denies problems obtaining or affording medication  - pt drives  and lives with spouse (3/8/2024 11:33 AM)    Appointments  Does the patient have a primary care provider?: Yes (3/8/2024 11:33 AM)  Care Management Interventions: Educated patient on importance of making appointment (3/8/2024 11:33 AM)  Has the patient kept scheduled appointments due by today?: Yes (3/8/2024 11:33 AM)    Self Management  What is the home health agency?: denies need for [facility] (3/8/2024 11:33 AM)  Has home health visited the patient within 72 hours of discharge?: Not applicable (3/8/2024 11:33 AM)  What Durable Medical Equipment (DME) was ordered?: pt has home o2 and home dialysis (3/8/2024 11:33 AM)  Has all Durable Medical Equipment (DME) been delivered?: Yes (3/8/2024 11:33 AM)    Patient Teaching  Does the patient have  access to their discharge instructions?: Yes (3/8/2024 11:33 AM)  Care Management Interventions: Reviewed instructions with patient (3/8/2024 11:33 AM)  What is the patient's perception of their health status since discharge?: Improving (3/8/2024 11:33 AM)  Patient/Caregiver Education Comments: This CM spoke with pt via phone. Pt reports doing well at home since discharge. New meds reviewed. Pt denies CP and SOB. Pt aware of my availability for non-emergent concerns. Contact info provided to patient (3/8/2024 11:33 AM)

## 2024-03-09 LAB
BACTERIA BLD CULT: NORMAL
BACTERIA BLD CULT: NORMAL

## 2024-03-15 DIAGNOSIS — I50.31 ACUTE DIASTOLIC (CONGESTIVE) HEART FAILURE (MULTI): Primary | ICD-10-CM

## 2024-03-15 DIAGNOSIS — J44.9 COPD, SEVERE (MULTI): ICD-10-CM

## 2024-03-15 DIAGNOSIS — G45.9 TIA (TRANSIENT ISCHEMIC ATTACK): ICD-10-CM

## 2024-03-16 LAB
ATRIAL RATE: 81 BPM
P AXIS: 74 DEGREES
PR INTERVAL: 162 MS
Q ONSET: 249 MS
QRS COUNT: 13 BEATS
QRS DURATION: 102 MS
QT INTERVAL: 367 MS
QTC CALCULATION(BAZETT): 424 MS
QTC FREDERICIA: 404 MS
R AXIS: 90 DEGREES
T AXIS: 21 DEGREES
T OFFSET: 433 MS
VENTRICULAR RATE: 80 BPM

## 2024-03-19 ENCOUNTER — TELEPHONE (OUTPATIENT)
Dept: PRIMARY CARE | Facility: CLINIC | Age: 74
End: 2024-03-19
Payer: MEDICARE

## 2024-03-19 DIAGNOSIS — E05.00 GOITER WITH HYPERTHYROIDISM: ICD-10-CM

## 2024-03-19 DIAGNOSIS — J44.9 COPD, SEVERE (MULTI): ICD-10-CM

## 2024-03-19 DIAGNOSIS — I70.0 ATHEROSCLEROSIS OF AORTA (CMS-HCC): ICD-10-CM

## 2024-03-19 DIAGNOSIS — E55.9 VITAMIN D DEFICIENCY: ICD-10-CM

## 2024-03-19 DIAGNOSIS — N18.6 END STAGE RENAL DISEASE (MULTI): ICD-10-CM

## 2024-03-19 DIAGNOSIS — E78.5 DYSLIPIDEMIA, GOAL LDL BELOW 70: ICD-10-CM

## 2024-03-19 DIAGNOSIS — D51.3 OTHER DIETARY VITAMIN B12 DEFICIENCY ANEMIA: ICD-10-CM

## 2024-03-19 RX ORDER — METHIMAZOLE 5 MG/1
5 TABLET ORAL DAILY
Qty: 30 TABLET | Refills: 3 | Status: SHIPPED | OUTPATIENT
Start: 2024-03-19

## 2024-03-19 NOTE — TELEPHONE ENCOUNTER
Med Refill   methIMAzole (Tapazole) 5 mg tablet [[id number]]     [id number] - [place], [place] - [address]  [address], [facility] [address]  Phone: [phone]  Fax: [phone]

## 2024-03-23 PROBLEM — R06.83 SNORING: Status: ACTIVE | Noted: 2024-03-23

## 2024-03-23 PROBLEM — L03.032 CELLULITIS OF LEFT TOE: Status: ACTIVE | Noted: 2024-03-23

## 2024-03-23 PROBLEM — R78.81 BACTEREMIA: Status: ACTIVE | Noted: 2024-03-23

## 2024-03-23 PROBLEM — Z91.89 OTHER SPECIFIED PERSONAL RISK FACTORS, NOT ELSEWHERE CLASSIFIED: Status: ACTIVE | Noted: 2024-03-23

## 2024-03-23 PROBLEM — F43.10 POSTTRAUMATIC STRESS DISORDER: Status: ACTIVE | Noted: 2018-11-19

## 2024-03-23 PROBLEM — R05.8 COUGH PRODUCTIVE OF YELLOW SPUTUM: Status: ACTIVE | Noted: 2024-03-23

## 2024-03-23 PROBLEM — Z86.69 PERSONAL HISTORY OF OTHER DISEASES OF THE NERVOUS SYSTEM AND SENSE ORGANS: Status: ACTIVE | Noted: 2024-03-23

## 2024-03-23 PROBLEM — R79.0 ABNORMAL LEVEL OF BLOOD MINERAL: Status: ACTIVE | Noted: 2024-03-23

## 2024-03-23 PROBLEM — G47.19 OTHER HYPERSOMNIA: Status: ACTIVE | Noted: 2024-03-23

## 2024-03-23 PROBLEM — J90 PLEURAL EFFUSION, NOT ELSEWHERE CLASSIFIED: Status: ACTIVE | Noted: 2024-03-23

## 2024-03-23 PROBLEM — I27.81 COR PULMONALE (CHRONIC) (MULTI): Status: ACTIVE | Noted: 2024-03-23

## 2024-03-23 PROBLEM — I30.1: Status: ACTIVE | Noted: 2024-03-23

## 2024-03-23 PROBLEM — Z86.19 PERSONAL HISTORY OF OTHER INFECTIOUS AND PARASITIC DISEASES: Status: ACTIVE | Noted: 2024-03-23

## 2024-03-23 PROBLEM — Z86.39 PERSONAL HISTORY OF OTHER ENDOCRINE, NUTRITIONAL AND METABOLIC DISEASE: Status: ACTIVE | Noted: 2024-03-23

## 2024-03-23 PROBLEM — Z87.891 PERSONAL HISTORY OF NICOTINE DEPENDENCE: Status: ACTIVE | Noted: 2024-03-23

## 2024-03-23 PROBLEM — Z20.822 CONTACT WITH AND (SUSPECTED) EXPOSURE TO COVID-19: Status: ACTIVE | Noted: 2023-07-03

## 2024-03-23 PROBLEM — A04.72 COLITIS DUE TO CLOSTRIDIOIDES DIFFICILE: Status: ACTIVE | Noted: 2024-03-23

## 2024-03-23 PROBLEM — H60.509 ACUTE NON-INFECTIVE OTITIS EXTERNA: Status: ACTIVE | Noted: 2024-03-23

## 2024-03-23 PROBLEM — N39.0 URINARY TRACT INFECTION: Status: ACTIVE | Noted: 2023-08-06

## 2024-03-23 PROBLEM — G47.33 OBSTRUCTIVE SLEEP APNEA (ADULT) (PEDIATRIC): Status: ACTIVE | Noted: 2024-03-23

## 2024-03-23 PROBLEM — Z87.19 PERSONAL HISTORY OF OTHER DISEASES OF THE DIGESTIVE SYSTEM: Status: ACTIVE | Noted: 2024-03-23

## 2024-03-23 PROBLEM — D51.8 DIETARY VITAMIN B12 DEFICIENCY ANEMIA: Status: ACTIVE | Noted: 2023-04-17

## 2024-03-23 PROBLEM — Z87.2 PERSONAL HISTORY OF DISEASES OF THE SKIN AND SUBCUTANEOUS TISSUE: Status: ACTIVE | Noted: 2024-03-23

## 2024-03-23 PROBLEM — Z86.73 HISTORY OF CEREBROVASCULAR ACCIDENT: Status: ACTIVE | Noted: 2024-03-23

## 2024-03-23 PROBLEM — E04.2 NONTOXIC MULTINODULAR GOITER: Status: ACTIVE | Noted: 2024-03-23

## 2024-03-23 PROBLEM — Z87.898 PERSONAL HISTORY OF OTHER SPECIFIED CONDITIONS: Status: ACTIVE | Noted: 2024-03-23

## 2024-03-23 PROBLEM — R10.9 ABDOMINAL PAIN: Status: ACTIVE | Noted: 2023-06-17

## 2024-03-23 PROBLEM — R40.0 DAYTIME SOMNOLENCE: Status: ACTIVE | Noted: 2024-03-23

## 2024-03-23 PROBLEM — N17.9 ACUTE KIDNEY FAILURE, UNSPECIFIED (CMS-HCC): Status: ACTIVE | Noted: 2024-03-23

## 2024-03-23 PROBLEM — R19.5 OTHER FECAL ABNORMALITIES: Status: ACTIVE | Noted: 2024-03-23

## 2024-03-23 PROBLEM — R21 RASH AND OTHER NONSPECIFIC SKIN ERUPTION: Status: ACTIVE | Noted: 2024-03-23

## 2024-03-23 PROBLEM — D75.1 SECONDARY POLYCYTHEMIA: Status: ACTIVE | Noted: 2024-03-23

## 2024-03-23 PROBLEM — J13 PNEUMONIA DUE TO STREPTOCOCCUS PNEUMONIAE (CMS-HCC): Status: ACTIVE | Noted: 2024-03-23

## 2024-03-23 PROBLEM — D50.0 IRON DEFICIENCY ANEMIA DUE TO CHRONIC BLOOD LOSS: Status: ACTIVE | Noted: 2024-03-23
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
## 2024-03-23 PROBLEM — R42 DIZZINESS: Status: ACTIVE | Noted: 2023-08-06

## 2024-03-23 PROBLEM — I51.89 DIASTOLIC DYSFUNCTION: Status: ACTIVE | Noted: 2024-03-23

## 2024-03-23 PROBLEM — R09.89 OTHER SPECIFIED SYMPTOMS AND SIGNS INVOLVING THE CIRCULATORY AND RESPIRATORY SYSTEMS: Status: ACTIVE | Noted: 2024-03-23

## 2024-03-23 RX ORDER — HYDROXYZINE HYDROCHLORIDE 25 MG/1
25 TABLET, FILM COATED ORAL 3 TIMES DAILY
COMMUNITY
Start: 2021-02-22 | End: 2024-03-28 | Stop reason: WASHOUT

## 2024-03-23 RX ORDER — CEFEPIME HYDROCHLORIDE 1 G/1
INJECTION, POWDER, FOR SOLUTION INTRAMUSCULAR; INTRAVENOUS
COMMUNITY
Start: 2023-08-03 | End: 2024-03-28 | Stop reason: WASHOUT

## 2024-03-23 RX ORDER — CALCIUM ACETATE 667 MG/1
CAPSULE ORAL
COMMUNITY
Start: 2020-07-17 | End: 2024-04-04 | Stop reason: ALTCHOICE

## 2024-03-23 RX ORDER — CETIRIZINE HYDROCHLORIDE 10 MG/1
10 TABLET ORAL EVERY 12 HOURS
COMMUNITY
Start: 2020-03-11 | End: 2024-03-28 | Stop reason: WASHOUT

## 2024-03-23 RX ORDER — ALBUTEROL SULFATE 90 UG/1
2 AEROSOL, METERED RESPIRATORY (INHALATION) EVERY 4 HOURS PRN
COMMUNITY
Start: 2020-01-18 | End: 2024-03-28 | Stop reason: WASHOUT

## 2024-03-23 RX ORDER — BETAMETHASONE DIPROPIONATE 0.5 MG/G
1 CREAM TOPICAL EVERY 12 HOURS
COMMUNITY
Start: 2021-02-22 | End: 2024-03-28 | Stop reason: WASHOUT

## 2024-03-23 RX ORDER — FLUTICASONE PROPIONATE 50 MCG
1 SPRAY, SUSPENSION (ML) NASAL DAILY
COMMUNITY
Start: 2023-08-03 | End: 2024-03-28 | Stop reason: WASHOUT

## 2024-03-25 NOTE — PROGRESS NOTES
Chief Complaint/Reason for Visit:   Patient is coming in for 4 month cardiovascular follow up.      History Of Present Illness:    Mr. Ward is coming in today as a 4-month cardiovascular follow-up.  He was also recently hospitalized from March 5 through March 7 for pneumonia.  We have followed this patient previously for atrial flutter, atheromatous aortic disease, hypertension, tobacco use disorder, and a prior TIA.    Patient denies any chest pain, pressure, palpitations, orthopnea, or edema.  He had recent pneumonia and is now back on home oxygen.  He reports that he is slowly improving.  He still has some mild dyspnea on exertion and an occasional cough.  He is taking his medication as prescribed.  Patient tells me his home blood pressures are at his baseline which is 140 systolic.  He is on home hemodialysis 4 nights per week.    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), 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), Nicotine dependence, unspecified, uncomplicated (01/14/2020), Nontoxic multinodular goiter (11/19/2020), Obstructive sleep apnea (adult) (pediatric) (03/11/2020), Other allergic rhinitis (02/23/2021), Other fecal abnormalities (07/30/2021), Other hypersomnia (01/23/2020), Other hypersomnia (01/23/2020), Other ill-defined heart diseases (04/27/2020), Other pericardial effusion (noninflammatory)  (06/18/2020), Other specified personal risk factors, not elsewhere classified (07/30/2020), Other specified symptoms and signs involving the circulatory and respiratory systems (10/10/2019), Personal history of diseases of the skin and subcutaneous tissue (11/19/2020), Personal history of nicotine dependence (08/18/2021), Personal history of other diseases of the digestive system, Personal history of other diseases of the nervous system and sense organs, Personal history of other endocrine, nutritional and metabolic disease (11/19/2020), Personal history of other endocrine, nutritional and metabolic disease (10/14/2020), Personal history of other endocrine, nutritional and metabolic disease (08/20/2020), Personal history of other infectious and parasitic diseases (07/06/2021), Personal history of other infectious and parasitic diseases (02/04/2021), Personal history of other specified conditions (04/12/2016), Personal history of other specified conditions (04/07/2016), Personal history of transient ischemic attack (TIA), and cerebral infarction without residual deficits, Pleural effusion, not elsewhere classified (07/30/2020), Pleural effusion, not elsewhere classified (03/11/2020), Pneumonia due to Streptococcus pneumoniae (CMS/HCC) (03/11/2020), Post-traumatic stress disorder, unspecified, Rash and other nonspecific skin eruption (03/25/2021), Secondary polycythemia (01/23/2020), and Tinea unguium (08/25/2020).    Past Surgical History:  He has a past surgical history that includes Appendectomy (04/07/2016) and IR CVC tunneled (6/28/2023).      Social History:  He reports that he has quit smoking. His smoking use included cigarettes. He has never used smokeless tobacco. He reports that he does not currently use alcohol. He reports that he does not use drugs.    Family History:  Family History   Family history unknown: Yes        Allergies:  Penicillins    Medications:  Current Outpatient Medications   Medication  "Instructions    atorvastatin (LIPITOR) 40 mg, oral, Nightly    calcium acetate (Phoslo) 667 mg capsule oral    carvedilol (Coreg) 6.25 mg tablet 1 tablet, oral, 2 times daily with meals    cyanocobalamin (vitamin B-12) (VITAMIN B-12) 1,000 mcg, oral, Daily    Eliquis 5 mg, oral, 2 times daily    ergocalciferol (VITAMIN D-2) 1,250 mcg, oral, Weekly    famotidine (PEPCID) 40 mg, oral, 2 times daily    fluticasone-umeclidin-vilanter (Trelegy Ellipta) 100-62.5-25 mcg blister with device 1 puff, inhalation, Daily RT    losartan (COZAAR) 100 mg, oral, Daily    methIMAzole (TAPAZOLE) 5 mg, oral, Daily    montelukast (SINGULAIR) 10 mg, oral, Nightly    Mary Grace-Darryl Rx 1- mg-mg-mcg tablet 1 tablet, oral, Daily    torsemide (DEMADEX) 20 mg, oral, 2 times daily       Review of Systems:  Constitutional: not feeling tired.   Eyes: no eyesight problems.   ENT: no hearing loss.   Cardiovascular: no chest pain,~no tightness or heavy pressure,~no shortness of breath,~no palpitations~and~no lower extremity edema.   Respiratory: Recent pneumonia, and now on O2. Slowly improving.    Gastrointestinal: no blood in stools.   Genitourinary: no hematuria~. Home hemodialysis 4 nights per week for past ~5 months  Musculoskeletal: arthralgias.   Skin: no skin rashes.   Neurological: no frequent falls~and~no dizziness.   Psychiatric: no confusion~and~no memory lapses or loss.   Endocrine: thyroid disorder, but~no diabetes mellitus.     Vitals  Visit Vitals  /62   Pulse 96   Ht 1.753 m (5' 9\")   Wt 83.9 kg (185 lb)   SpO2 92%   BMI 27.32 kg/m²   Smoking Status Former   BSA 2.02 m²          Physical Exam:  Constitutional: alert and in no acute distress.   Eyes: no erythema, swelling or discharge from the eye .   Ears, Nose, Mouth, and Throat: external inspection of ears and nose is normal .   Neck: neck is supple, symmetric, trachea midline, no masses .   Pulmonary: no increased work of breathing or signs of respiratory distress " ~and~lungs clear to auscultation.    Cardiovascular:  + L carotid bruit. No JVD. the heart rate was normal the rhythm was regular,~normal S1,~normal S2,~no S3,~no S4 no murmurs were heard.,~pedal pulses 2+ bilaterally ~and~no edema .   Abdomen: abdomen non-tender, no masses ~and~slightly firm, nontender, active bowel sounds.   Musculoskeletal: normal gait. LUE with AV fistula, + thrill  Skin: skin warm and dry, normal skin turgor .   Neurologic: non-focal neurologic examination.   Psychiatric oriented to person, place and time ~and~normal mood and affect      Last Labs:  CBC -  Lab Results   Component Value Date    WBC 9.3 03/05/2024    HGB 10.5 (L) 03/05/2024    HCT 33.3 (L) 03/05/2024    MCV 95 03/05/2024     03/05/2024     Lab Results   Component Value Date    GLUCOSE 86 03/05/2024    CALCIUM 8.9 03/05/2024     03/05/2024    K 5.0 03/05/2024    CO2 30 03/05/2024     03/05/2024    BUN 37 (H) 03/05/2024    CREATININE 5.45 (H) 03/05/2024      CMP -  Lab Results   Component Value Date    CALCIUM 8.9 03/05/2024    PHOS CANCELED 08/06/2023    PROT 6.0 (L) 03/05/2024    ALBUMIN 3.7 03/05/2024    AST 9 03/05/2024    ALT 15 03/05/2024    ALKPHOS 56 03/05/2024    BILITOT 0.4 03/05/2024       LIPID PANEL -   Lab Results   Component Value Date    CHOL 112 04/14/2023    TRIG 161 (H) 04/14/2023    HDL 26.6 (A) 04/14/2023    CHHDL 4.2 04/14/2023    LDLF 53 04/14/2023    VLDL 32 04/14/2023       Lab Results   Component Value Date     (H) 03/05/2024    HGBA1C 6.1 12/10/2019       Last Cardiology Tests:  Echo:7-9-20  CONCLUSIONS:   1. The left ventricular systolic function is mildly decreased with a 45-50% estimated ejection fraction.   2. RVSP within normal limits.   3. There is global hypokinesis of the left ventricle with minor regional variations.       Lab review: I have personally reviewed the laboratory result(s)     Assessment/Plan:  Paroxysmal atrial flutter: Patient has a history of recurrent  atrial flutter.  He has had 2 prior ablations.  He follows with the EP service as well.  Patient on exam today is regular.  He is on carvedilol 6.25 twice daily.  Patient has an elevated [id number]-[id number] score and is anticoagulated with Eliquis.  He is not having any abnormal bleeding or bruising and should continue on anticoagulation.    Hypertension: Blood pressure today was initially elevated and came down to 148/62 after he rested.  Patient tells me that he normally runs in the 140s systolic which he feels is necessary in order for him to get dialysis.  Patient has had some issues with hypotension on his dialysis days.  He currently is on home hemodialysis 4 nights per week.  I encouraged him to be on a heart healthy low-sodium diet.  He will continue on carvedilol, losartan, and torsemide.    Hyperlipidemia: Lipid labs from April 2023 show triglycerides 161, LDL 53.  Patient currently is on atorvastatin 40 mg nightly.  He sees his PCP next week and will likely have fasting blood work done at that time.    Patient will keep his follow-up appointment with his PCP next week.  He is scheduled to follow-up with Dr. Feng in August.  Patient instructed to call with any cardiovascular complaints. All questions were answered.       Dragon dictation was utilized to create this document. Quite often unanticipated grammatical, syntax,  and other interpretive errors are inadvertently transcribed by the computer software.  Please disregard these errors.  Please excuse any errors that have escaped final proofreading.        Saritha Araujo, APRN-CNP

## 2024-03-27 ENCOUNTER — PATIENT OUTREACH (OUTPATIENT)
Dept: PRIMARY CARE | Facility: CLINIC | Age: 74
End: 2024-03-27
Payer: MEDICARE

## 2024-03-27 NOTE — PROGRESS NOTES
Call regarding hospitalization. Pt to see pcp 4/4/24.  At time of outreach call the patient feels as if their condition has (improved/) since last visit. Pt aware of my availability for non emergent concerns. Contact info provided to patient.

## 2024-03-28 ENCOUNTER — OFFICE VISIT (OUTPATIENT)
Dept: CARDIOLOGY | Facility: CLINIC | Age: 74
End: 2024-03-28
Payer: MEDICARE

## 2024-03-28 VITALS
HEIGHT: 69 IN | WEIGHT: 185 LBS | SYSTOLIC BLOOD PRESSURE: 148 MMHG | BODY MASS INDEX: 27.4 KG/M2 | DIASTOLIC BLOOD PRESSURE: 62 MMHG | OXYGEN SATURATION: 92 % | HEART RATE: 96 BPM

## 2024-03-28 DIAGNOSIS — I48.92 ATRIAL FLUTTER, PAROXYSMAL (MULTI): Primary | ICD-10-CM

## 2024-03-28 DIAGNOSIS — E78.5 DYSLIPIDEMIA, GOAL LDL BELOW 70: ICD-10-CM

## 2024-03-28 DIAGNOSIS — I10 ESSENTIAL HYPERTENSION: ICD-10-CM

## 2024-03-28 DIAGNOSIS — I70.0 ATHEROSCLEROSIS OF AORTA (CMS-HCC): ICD-10-CM

## 2024-03-28 DIAGNOSIS — I50.31 ACUTE DIASTOLIC (CONGESTIVE) HEART FAILURE (MULTI): ICD-10-CM

## 2024-03-28 PROCEDURE — 3078F DIAST BP <80 MM HG: CPT | Performed by: NURSE PRACTITIONER

## 2024-03-28 PROCEDURE — 99213 OFFICE O/P EST LOW 20 MIN: CPT | Performed by: NURSE PRACTITIONER

## 2024-03-28 PROCEDURE — 1036F TOBACCO NON-USER: CPT | Performed by: NURSE PRACTITIONER

## 2024-03-28 PROCEDURE — 1111F DSCHRG MED/CURRENT MED MERGE: CPT | Performed by: NURSE PRACTITIONER

## 2024-03-28 PROCEDURE — 3077F SYST BP >= 140 MM HG: CPT | Performed by: NURSE PRACTITIONER

## 2024-03-28 PROCEDURE — 1159F MED LIST DOCD IN RCRD: CPT | Performed by: NURSE PRACTITIONER

## 2024-03-28 NOTE — PATIENT INSTRUCTIONS
Continue on current meds  Heart healthy, low sodium diet  Mediterranean diet is recommended  Follow up with Dr Feng in August

## 2024-04-04 ENCOUNTER — OFFICE VISIT (OUTPATIENT)
Dept: PRIMARY CARE | Facility: CLINIC | Age: 74
End: 2024-04-04
Payer: MEDICARE

## 2024-04-04 VITALS
HEIGHT: 69 IN | OXYGEN SATURATION: 95 % | DIASTOLIC BLOOD PRESSURE: 62 MMHG | HEART RATE: 90 BPM | WEIGHT: 175 LBS | BODY MASS INDEX: 25.92 KG/M2 | SYSTOLIC BLOOD PRESSURE: 130 MMHG

## 2024-04-04 DIAGNOSIS — N18.6 ANEMIA IN ESRD (END-STAGE RENAL DISEASE) (MULTI): ICD-10-CM

## 2024-04-04 DIAGNOSIS — E78.5 DYSLIPIDEMIA, GOAL LDL BELOW 70: ICD-10-CM

## 2024-04-04 DIAGNOSIS — D63.1 ANEMIA IN ESRD (END-STAGE RENAL DISEASE) (MULTI): ICD-10-CM

## 2024-04-04 DIAGNOSIS — N25.81 SECONDARY HYPERPARATHYROIDISM OF RENAL ORIGIN (MULTI): ICD-10-CM

## 2024-04-04 DIAGNOSIS — J44.9 COPD, SEVERE (MULTI): ICD-10-CM

## 2024-04-04 DIAGNOSIS — I48.92 ATRIAL FLUTTER, PAROXYSMAL (MULTI): ICD-10-CM

## 2024-04-04 DIAGNOSIS — J30.89 PERENNIAL ALLERGIC RHINITIS WITH SEASONAL VARIATION: ICD-10-CM

## 2024-04-04 DIAGNOSIS — E55.9 VITAMIN D DEFICIENCY: ICD-10-CM

## 2024-04-04 DIAGNOSIS — Z00.00 MEDICARE ANNUAL WELLNESS VISIT, SUBSEQUENT: Primary | ICD-10-CM

## 2024-04-04 DIAGNOSIS — Z99.2 HEMODIALYSIS PATIENT (CMS-HCC): ICD-10-CM

## 2024-04-04 DIAGNOSIS — N18.6 END STAGE RENAL DISEASE (MULTI): ICD-10-CM

## 2024-04-04 DIAGNOSIS — J30.2 PERENNIAL ALLERGIC RHINITIS WITH SEASONAL VARIATION: ICD-10-CM

## 2024-04-04 DIAGNOSIS — I27.81 COR PULMONALE (CHRONIC) (MULTI): ICD-10-CM

## 2024-04-04 DIAGNOSIS — N18.6 ESRD (END STAGE RENAL DISEASE) (MULTI): ICD-10-CM

## 2024-04-04 DIAGNOSIS — E05.00 GOITER WITH HYPERTHYROIDISM: ICD-10-CM

## 2024-04-04 DIAGNOSIS — I70.0 ATHEROSCLEROSIS OF AORTA (CMS-HCC): ICD-10-CM

## 2024-04-04 DIAGNOSIS — J44.1 COPD WITH ACUTE EXACERBATION (MULTI): ICD-10-CM

## 2024-04-04 DIAGNOSIS — D51.3 OTHER DIETARY VITAMIN B12 DEFICIENCY ANEMIA: ICD-10-CM

## 2024-04-04 PROBLEM — R21 RASH AND OTHER NONSPECIFIC SKIN ERUPTION: Status: RESOLVED | Noted: 2024-03-23 | Resolved: 2024-04-04

## 2024-04-04 PROBLEM — G47.19 OTHER HYPERSOMNIA: Status: RESOLVED | Noted: 2024-03-23 | Resolved: 2024-04-04

## 2024-04-04 PROBLEM — J34.3 HYPERTROPHY OF NASAL TURBINATES: Status: RESOLVED | Noted: 2023-09-21 | Resolved: 2024-04-04

## 2024-04-04 PROBLEM — G45.9 TIA (TRANSIENT ISCHEMIC ATTACK): Status: RESOLVED | Noted: 2023-06-08 | Resolved: 2024-04-04

## 2024-04-04 PROBLEM — R19.5 OTHER FECAL ABNORMALITIES: Status: RESOLVED | Noted: 2024-03-23 | Resolved: 2024-04-04

## 2024-04-04 PROBLEM — Z91.89 OTHER SPECIFIED PERSONAL RISK FACTORS, NOT ELSEWHERE CLASSIFIED: Status: RESOLVED | Noted: 2024-03-23 | Resolved: 2024-04-04

## 2024-04-04 PROBLEM — D75.1 SECONDARY POLYCYTHEMIA: Status: RESOLVED | Noted: 2024-03-23 | Resolved: 2024-04-04

## 2024-04-04 PROBLEM — Z87.19 PERSONAL HISTORY OF OTHER DISEASES OF THE DIGESTIVE SYSTEM: Status: RESOLVED | Noted: 2024-03-23 | Resolved: 2024-04-04

## 2024-04-04 PROBLEM — G47.33 OBSTRUCTIVE SLEEP APNEA (ADULT) (PEDIATRIC): Status: RESOLVED | Noted: 2024-03-23 | Resolved: 2024-04-04

## 2024-04-04 PROBLEM — L03.032 CELLULITIS OF LEFT TOE: Status: RESOLVED | Noted: 2024-03-23 | Resolved: 2024-04-04

## 2024-04-04 PROBLEM — Z87.2 PERSONAL HISTORY OF DISEASES OF THE SKIN AND SUBCUTANEOUS TISSUE: Status: RESOLVED | Noted: 2024-03-23 | Resolved: 2024-04-04

## 2024-04-04 PROBLEM — Z86.69 PERSONAL HISTORY OF OTHER DISEASES OF THE NERVOUS SYSTEM AND SENSE ORGANS: Status: RESOLVED | Noted: 2024-03-23 | Resolved: 2024-04-04

## 2024-04-04 PROBLEM — I30.1: Status: RESOLVED | Noted: 2024-03-23 | Resolved: 2024-04-04

## 2024-04-04 PROBLEM — D50.0 IRON DEFICIENCY ANEMIA DUE TO CHRONIC BLOOD LOSS: Status: RESOLVED | Noted: 2024-03-23 | Resolved: 2024-04-04

## 2024-04-04 PROBLEM — J33.9 NASAL POLYPS: Status: RESOLVED | Noted: 2023-04-17 | Resolved: 2024-04-04

## 2024-04-04 PROBLEM — Z86.19 PERSONAL HISTORY OF OTHER INFECTIOUS AND PARASITIC DISEASES: Status: RESOLVED | Noted: 2024-03-23 | Resolved: 2024-04-04

## 2024-04-04 PROBLEM — R09.89 OTHER SPECIFIED SYMPTOMS AND SIGNS INVOLVING THE CIRCULATORY AND RESPIRATORY SYSTEMS: Status: RESOLVED | Noted: 2024-03-23 | Resolved: 2024-04-04

## 2024-04-04 PROBLEM — Z20.822 CONTACT WITH AND (SUSPECTED) EXPOSURE TO COVID-19: Status: RESOLVED | Noted: 2023-07-03 | Resolved: 2024-04-04

## 2024-04-04 PROBLEM — R42 DIZZINESS: Status: RESOLVED | Noted: 2023-08-06 | Resolved: 2024-04-04

## 2024-04-04 PROBLEM — R78.81 BACTEREMIA: Status: RESOLVED | Noted: 2024-03-23 | Resolved: 2024-04-04

## 2024-04-04 PROBLEM — R10.9 ABDOMINAL PAIN: Status: RESOLVED | Noted: 2023-06-17 | Resolved: 2024-04-04

## 2024-04-04 PROBLEM — Z87.891 PERSONAL HISTORY OF NICOTINE DEPENDENCE: Status: RESOLVED | Noted: 2024-03-23 | Resolved: 2024-04-04

## 2024-04-04 PROBLEM — N39.0 URINARY TRACT INFECTION: Status: RESOLVED | Noted: 2023-08-06 | Resolved: 2024-04-04

## 2024-04-04 PROBLEM — Z86.39 PERSONAL HISTORY OF OTHER ENDOCRINE, NUTRITIONAL AND METABOLIC DISEASE: Status: RESOLVED | Noted: 2024-03-23 | Resolved: 2024-04-04

## 2024-04-04 PROBLEM — H60.509 ACUTE NON-INFECTIVE OTITIS EXTERNA: Status: RESOLVED | Noted: 2024-03-23 | Resolved: 2024-04-04

## 2024-04-04 PROBLEM — R79.0 ABNORMAL LEVEL OF BLOOD MINERAL: Status: RESOLVED | Noted: 2024-03-23 | Resolved: 2024-04-04

## 2024-04-04 PROBLEM — I51.89 DIASTOLIC DYSFUNCTION: Status: RESOLVED | Noted: 2024-03-23 | Resolved: 2024-04-04

## 2024-04-04 PROBLEM — I50.31 ACUTE DIASTOLIC (CONGESTIVE) HEART FAILURE (MULTI): Status: RESOLVED | Noted: 2024-03-15 | Resolved: 2024-04-04

## 2024-04-04 PROBLEM — R40.0 DAYTIME SOMNOLENCE: Status: RESOLVED | Noted: 2024-03-23 | Resolved: 2024-04-04

## 2024-04-04 PROBLEM — R06.83 SNORING: Status: RESOLVED | Noted: 2024-03-23 | Resolved: 2024-04-04

## 2024-04-04 PROBLEM — J90 PLEURAL EFFUSION, NOT ELSEWHERE CLASSIFIED: Status: RESOLVED | Noted: 2024-03-23 | Resolved: 2024-04-04

## 2024-04-04 PROBLEM — A04.72 COLITIS DUE TO CLOSTRIDIOIDES DIFFICILE: Status: RESOLVED | Noted: 2024-03-23 | Resolved: 2024-04-04
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
## 2024-04-04 PROBLEM — Z87.898 PERSONAL HISTORY OF OTHER SPECIFIED CONDITIONS: Status: RESOLVED | Noted: 2024-03-23 | Resolved: 2024-04-04

## 2024-04-04 PROBLEM — E04.2 NONTOXIC MULTINODULAR GOITER: Status: RESOLVED | Noted: 2024-03-23 | Resolved: 2024-04-04

## 2024-04-04 PROBLEM — N17.9 ACUTE KIDNEY FAILURE, UNSPECIFIED (CMS-HCC): Status: RESOLVED | Noted: 2024-03-23 | Resolved: 2024-04-04

## 2024-04-04 PROBLEM — R05.8 COUGH PRODUCTIVE OF YELLOW SPUTUM: Status: RESOLVED | Noted: 2024-03-23 | Resolved: 2024-04-04

## 2024-04-04 PROBLEM — J13 PNEUMONIA DUE TO STREPTOCOCCUS PNEUMONIAE (CMS-HCC): Status: RESOLVED | Noted: 2024-03-23 | Resolved: 2024-04-04

## 2024-04-04 PROCEDURE — 99215 OFFICE O/P EST HI 40 MIN: CPT | Performed by: INTERNAL MEDICINE

## 2024-04-04 PROCEDURE — 99397 PER PM REEVAL EST PAT 65+ YR: CPT | Performed by: INTERNAL MEDICINE

## 2024-04-04 PROCEDURE — G0446 INTENS BEHAVE THER CARDIO DX: HCPCS | Performed by: INTERNAL MEDICINE

## 2024-04-04 PROCEDURE — 1111F DSCHRG MED/CURRENT MED MERGE: CPT | Performed by: INTERNAL MEDICINE

## 2024-04-04 PROCEDURE — 1160F RVW MEDS BY RX/DR IN RCRD: CPT | Performed by: INTERNAL MEDICINE

## 2024-04-04 PROCEDURE — 1170F FXNL STATUS ASSESSED: CPT | Performed by: INTERNAL MEDICINE

## 2024-04-04 PROCEDURE — 1159F MED LIST DOCD IN RCRD: CPT | Performed by: INTERNAL MEDICINE

## 2024-04-04 PROCEDURE — G0439 PPPS, SUBSEQ VISIT: HCPCS | Performed by: INTERNAL MEDICINE

## 2024-04-04 PROCEDURE — 1036F TOBACCO NON-USER: CPT | Performed by: INTERNAL MEDICINE

## 2024-04-04 RX ORDER — AZITHROMYCIN 250 MG/1
TABLET, FILM COATED ORAL
Qty: 6 TABLET | Refills: 0 | Status: SHIPPED | OUTPATIENT
Start: 2024-04-04 | End: 2024-04-27 | Stop reason: HOSPADM

## 2024-04-04 RX ORDER — PREDNISONE 20 MG/1
TABLET ORAL
Qty: 18 TABLET | Refills: 0 | Status: SHIPPED | OUTPATIENT
Start: 2024-04-04 | End: 2024-04-27 | Stop reason: HOSPADM

## 2024-04-04 ASSESSMENT — PATIENT HEALTH QUESTIONNAIRE - PHQ9
SUM OF ALL RESPONSES TO PHQ9 QUESTIONS 1 AND 2: 0
1. LITTLE INTEREST OR PLEASURE IN DOING THINGS: NOT AT ALL
2. FEELING DOWN, DEPRESSED OR HOPELESS: NOT AT ALL

## 2024-04-04 ASSESSMENT — ANXIETY QUESTIONNAIRES
4. TROUBLE RELAXING: NOT AT ALL
IF YOU CHECKED OFF ANY PROBLEMS ON THIS QUESTIONNAIRE, HOW DIFFICULT HAVE THESE PROBLEMS MADE IT FOR YOU TO DO YOUR WORK, TAKE CARE OF THINGS AT HOME, OR GET ALONG WITH OTHER PEOPLE: NOT DIFFICULT AT ALL
1. FEELING NERVOUS, ANXIOUS, OR ON EDGE: NOT AT ALL
2. NOT BEING ABLE TO STOP OR CONTROL WORRYING: NOT AT ALL
GAD7 TOTAL SCORE: 0
3. WORRYING TOO MUCH ABOUT DIFFERENT THINGS: NOT AT ALL
5. BEING SO RESTLESS THAT IT IS HARD TO SIT STILL: NOT AT ALL
7. FEELING AFRAID AS IF SOMETHING AWFUL MIGHT HAPPEN: NOT AT ALL
6. BECOMING EASILY ANNOYED OR IRRITABLE: NOT AT ALL

## 2024-04-04 ASSESSMENT — ENCOUNTER SYMPTOMS
SHORTNESS OF BREATH: 1
OCCASIONAL FEELINGS OF UNSTEADINESS: 0
LOSS OF SENSATION IN FEET: 0
SPUTUM PRODUCTION: 1
WHEEZING: 1
COUGH: 1
RHINORRHEA: 1
DEPRESSION: 0

## 2024-04-04 ASSESSMENT — ACTIVITIES OF DAILY LIVING (ADL)
BATHING: INDEPENDENT
DRESSING: INDEPENDENT
GROCERY_SHOPPING: INDEPENDENT
DOING_HOUSEWORK: INDEPENDENT
TAKING_MEDICATION: INDEPENDENT
MANAGING_FINANCES: INDEPENDENT

## 2024-04-04 ASSESSMENT — COPD QUESTIONNAIRES: COPD: 1

## 2024-04-04 NOTE — ASSESSMENT & PLAN NOTE
Has not noted any signs of symptoms of acute congestive heart failure and no evidence of significant edema on lower extremity exam repeat chest x-ray to document resolution of pneumonia versus worsening status

## 2024-04-04 NOTE — ASSESSMENT & PLAN NOTE
>>ASSESSMENT AND PLAN FOR END STAGE RENAL FAILURE ON DIALYSIS (MULTI) WRITTEN ON 4/4/2024  6:59 PM BY DEVORAH MOSES, DO    Continue with hemodialysis at home on Monday Wednesday Friday follows with nephrologist closely hemodialysis administered at home by wife

## 2024-04-04 NOTE — PROGRESS NOTES
Subjective   Reason for Visit: Renard Ward is an 73 y.o. male here for a Medicare Wellness visit.     Past Medical, Surgical, and Family History reviewed and updated in chart.    Reviewed all medications by prescribing practitioner or clinical pharmacist (such as prescriptions, OTCs, herbal therapies and supplements) and documented in the medical record.    73-year-old male recently discharged from hospital on March 5 with acute on chronic respiratory failure and hypoxia complicated by bilateral pneumonia patient on hemodialysis at home using 5 L nasal cannula oxygen checks x-ray revealed bilateral infiltrates consistent with pneumonia patient placed on course of oral fluoroquinolone Levaquin taken every other day for 6 days as outpatient treatment seem to improve but this week started to have recurrent symptoms of shortness of breath sputum production significant nasal drainage and sputum with increased oxygen needs at home he does not appear to be in acute distress and is able to complete sentences although he has short of breath at rest denies fever nausea follow-up at  abdominal pain diarrhea has been doing okay with appetite and recently saw nurse practitioner from cardiology last week appear to be well compensated    Shortness of Breath  This is a recurrent problem. The current episode started more than 1 month ago. The problem occurs constantly. The problem has been gradually worsening. The average episode lasts 4 weeks. Associated symptoms include rhinorrhea, sputum production and wheezing. Nothing aggravates the symptoms. Risk factors include prolonged immobilization. He has tried beta agonist inhalers, leukotriene antagonists, rest, steroid inhalers and body position changes for the symptoms. The treatment provided no relief. His past medical history is significant for allergies, chronic lung disease, COPD, a heart failure and pneumonia.       Patient Care Team:  Leandro Bassett DO as PCP -  "General  Leandro Bassett DO as PCP - [employer] PCP  Janneth Moser RN as Care Manager (Case Management)     Review of Systems   HENT:  Positive for congestion, postnasal drip and rhinorrhea.    Respiratory:  Positive for cough, sputum production, shortness of breath and wheezing.    All other systems reviewed and are negative.      Objective   Vitals:  /62 (BP Location: Right arm, Patient Position: Sitting)   Pulse 90   Ht 1.753 m (5' 9\")   Wt 79.4 kg (175 lb)   SpO2 95%   BMI 25.84 kg/m²       Physical Exam  Vitals and nursing note reviewed.   Constitutional:       General: He is not in acute distress.     Appearance: Normal appearance. He is well-developed. He is not toxic-appearing.   HENT:      Head: Normocephalic and atraumatic.      Right Ear: Tympanic membrane and external ear normal.      Left Ear: Tympanic membrane and external ear normal.      Nose: Nose normal.      Mouth/Throat:      Mouth: Mucous membranes are moist.      Pharynx: Oropharynx is clear. No oropharyngeal exudate or posterior oropharyngeal erythema.      Tonsils: No tonsillar exudate. 2+ on the right. 2+ on the left.   Eyes:      Extraocular Movements: Extraocular movements intact.      Conjunctiva/sclera: Conjunctivae normal.   Cardiovascular:      Rate and Rhythm: Normal rate and regular rhythm.      Pulses: Normal pulses.      Heart sounds: Normal heart sounds. No murmur heard.  Pulmonary:      Effort: Pulmonary effort is normal. Prolonged expiration present.      Breath sounds: Decreased air movement present. Examination of the right-upper field reveals decreased breath sounds. Examination of the left-upper field reveals decreased breath sounds. Examination of the right-middle field reveals decreased breath sounds. Examination of the left-middle field reveals decreased breath sounds. Examination of the right-lower field reveals decreased breath sounds and rhonchi. Examination of the left-lower field reveals " decreased breath sounds and rhonchi. Decreased breath sounds and rhonchi present.   Abdominal:      General: Abdomen is flat. Bowel sounds are normal.      Palpations: Abdomen is soft.   Musculoskeletal:      Cervical back: Neck supple.   Feet:      Right foot:      Skin integrity: Skin integrity normal. No ulcer, blister, skin breakdown, erythema, warmth or callus.      Toenail Condition: Right toenails are normal.      Left foot:      Skin integrity: Skin integrity normal. No ulcer, blister, skin breakdown, erythema, warmth or callus.      Toenail Condition: Left toenails are normal.   Lymphadenopathy:      Cervical: No cervical adenopathy.   Skin:     General: Skin is warm and dry.      Capillary Refill: Capillary refill takes more than 3 seconds.      Findings: No rash.   Neurological:      Mental Status: He is alert. Mental status is at baseline.      Sensory: Sensation is intact.   Psychiatric:         Mood and Affect: Mood normal.         Behavior: Behavior normal.         Thought Content: Thought content normal.         Judgment: Judgment normal.         Assessment/Plan   Problem List Items Addressed This Visit       Atherosclerosis of aorta (CMS/[facility])    Current Assessment & Plan     Reevaluate with echocardiogram with cardiology         Atrial flutter, paroxysmal (CMS/[facility])    Current Assessment & Plan     Rate controlled at this time continue apixaban for stroke prophylaxis in the setting of previous stroke 2.5 mg twice a day         ESRD (end stage renal disease) (CMS/[facility])    Overview     Last Assessment & Plan: Formatting of this note might be different from the original. Assessment: On peritoneal dialysis, reports compliance with treatments. Follows with local nephrologist.         Current Assessment & Plan     Continue with hemodialysis at home on Monday Wednesday Friday follows with nephrologist closely hemodialysis administered at home by wife         Perennial allergic rhinitis with seasonal  variation    Current Assessment & Plan     Continue with montelukast 10 mg daily         Vitamin D deficiency    Hemodialysis patient (CMS/HCC)    Goiter with hyperthyroidism    Overview     >>OVERVIEW FOR HYPERTHYROIDISM WRITTEN ON 9/21/2023  4:09 AM BY LIZETT Nichole Assessment & Plan: Formatting of this note might be different from the original. Assessment: Continue methimazole (tapazole), denies any current thyroid symptoms. No results found for: TSH)         Current Assessment & Plan     Reevaluate thyroid functions on methimazole         Anemia in ESRD (end-stage renal disease) (CMS/[facility])    Current Assessment & Plan     Reevaluate blood work         Dyslipidemia, goal LDL below 70    Medicare annual wellness visit, subsequent - Primary    Current Assessment & Plan     Up-to-date with vaccinations and screenings.  Advanced medical directives discussed last visit screening for depression completed         Relevant Orders    Hepatitis C antibody    COPD, severe (CMS/HCC)    Overview     >>OVERVIEW FOR COPD (CHRONIC OBSTRUCTIVE PULMONARY DISEASE) (CMS/HCC) WRITTEN ON 9/21/2023  4:09 AM BY LIZETT Nichole Assessment & Plan: Formatting of this note might be different from the original. Images from the original note were not included. Assessment: Severe COPD. Follows with ALONDRA Gonzalez-CNP  pulmonology, last OV 1/20/2023. Compliant on inhalers. Denies recent exacerbations or hospitalizations. Previously required home oxygen use, no longer requires. Recent pulmonary testing PFT's below. Lungs CTA. No longer uses tobacco. 11/21/2022:         Current Assessment & Plan     Acute exacerbation at this time will prescribe azithromycin as directed and corticosteroid taper with prednisone back on 5 L nasal cannula oxygen reevaluate chest x-ray if no improvement consider hospitalization continue Trelegy Ellipta         Relevant Medications    azithromycin (Zithromax) 250 mg tablet    predniSONE (Deltasone) 20  mg tablet    Other Relevant Orders    XR chest 2 views    Secondary hyperparathyroidism of renal origin (CMS/[facility])    Current Assessment & Plan     Reevaluate vitamin D levels intact PTH and phosphorus follows with nephrology specialist on dialysis         Cor pulmonale (chronic) (CMS/[facility])    Overview     Cor pulmonale, chronic         Current Assessment & Plan     Has not noted any signs of symptoms of acute congestive heart failure and no evidence of significant edema on lower extremity exam repeat chest x-ray to document resolution of pneumonia versus worsening status         COPD with acute exacerbation (CMS/[facility])    Current Assessment & Plan     Treat with Zithromax and corticosteroid treatment reevaluate next visit          Other Visit Diagnoses       End stage renal disease (CMS/[facility])        Other dietary vitamin B12 deficiency anemia

## 2024-04-04 NOTE — ASSESSMENT & PLAN NOTE
Continue with hemodialysis at home on Monday Wednesday Friday follows with nephrologist closely hemodialysis administered at home by wife

## 2024-04-04 NOTE — ASSESSMENT & PLAN NOTE
Reevaluate vitamin D levels intact PTH and phosphorus follows with nephrology specialist on dialysis

## 2024-04-04 NOTE — PROGRESS NOTES
"Subjective   Reason for Visit: Renard Ward is an 73 y.o. male here for a Medicare Wellness visit.          Reviewed all medications by prescribing practitioner or clinical pharmacist (such as prescriptions, OTCs, herbal therapies and supplements) and documented in the medical record.    HPI    Patient Care Team:  Leandro Bassett DO as PCP - General  Leandro Bassett DO as PCP - [employer] PCP  Janneth Moser RN as Care Manager (Case Management)     Review of Systems    Objective   Vitals:  /62 (BP Location: Right arm, Patient Position: Sitting)   Pulse 90   Ht 1.753 m (5' 9\")   Wt 79.4 kg (175 lb)   SpO2 95%   BMI 25.84 kg/m²       Physical Exam    Assessment/Plan   Problem List Items Addressed This Visit       Atherosclerosis of aorta (CMS/[facility])    Atrial flutter, paroxysmal (CMS/[facility])    ESRD (end stage renal disease) (CMS/[facility])    Overview     Last Assessment & Plan: Formatting of this note might be different from the original. Assessment: On peritoneal dialysis, reports compliance with treatments. Follows with local nephrologist.         Vitamin D deficiency    Goiter with hyperthyroidism    Overview     >>OVERVIEW FOR HYPERTHYROIDISM WRITTEN ON 9/21/2023  4:09 AM BY LIZETT Nichole Assessment & Plan: Formatting of this note might be different from the original. Assessment: Continue methimazole (tapazole), denies any current thyroid symptoms. No results found for: TSH)         Dyslipidemia, goal LDL below 70    Medicare annual wellness visit, subsequent - Primary    COPD, severe (CMS/[facility])    Overview     >>OVERVIEW FOR COPD (CHRONIC OBSTRUCTIVE PULMONARY DISEASE) (CMS/[facility]) WRITTEN ON 9/21/2023  4:09 AM BY LIZETT Nichole Assessment & Plan: Formatting of this note might be different from the original. Images from the original note were not included. Assessment: Severe COPD. Follows with ALONDRA Gonzalez-CNP  pulmonology, last OV 1/20/2023. Compliant on inhalers. " Denies recent exacerbations or hospitalizations. Previously required home oxygen use, no longer requires. Recent pulmonary testing PFT's below. Lungs CTA. No longer uses tobacco. 11/21/2022:         Secondary hyperparathyroidism of renal origin (CMS/HCC)    Cor pulmonale (chronic) (CMS/HCC)    Overview     Cor pulmonale, chronic          Other Visit Diagnoses       End stage renal disease (CMS/[facility])        Other dietary vitamin B12 deficiency anemia

## 2024-04-04 NOTE — ASSESSMENT & PLAN NOTE
Rate controlled at this time continue apixaban for stroke prophylaxis in the setting of previous stroke 2.5 mg twice a day

## 2024-04-04 NOTE — ASSESSMENT & PLAN NOTE
Up-to-date with vaccinations and screenings.  Advanced medical directives discussed last visit screening for depression completed

## 2024-04-04 NOTE — ASSESSMENT & PLAN NOTE
Acute exacerbation at this time will prescribe azithromycin as directed and corticosteroid taper with prednisone back on 5 L nasal cannula oxygen reevaluate chest x-ray if no improvement consider hospitalization continue Nasir Benjamin

## 2024-04-04 NOTE — ASSESSMENT & PLAN NOTE
Patient was weaned off oxygen until recent bilateral pneumonia follow-up on chest x-ray results may have concomitant fluid overload although exam does not suggest acute CHF lung exam suggestive of recurrent pleural effusions right greater than left however reevaluate with chest x-ray and oxygen needs are getting worse then hospitalizations in order currently on 5 L nasal cannula

## 2024-04-08 DIAGNOSIS — J30.2 PERENNIAL ALLERGIC RHINITIS WITH SEASONAL VARIATION: ICD-10-CM

## 2024-04-08 DIAGNOSIS — J30.89 PERENNIAL ALLERGIC RHINITIS WITH SEASONAL VARIATION: ICD-10-CM

## 2024-04-08 RX ORDER — MONTELUKAST SODIUM 10 MG/1
10 TABLET ORAL NIGHTLY
Qty: 90 TABLET | Refills: 0 | Status: SHIPPED | OUTPATIENT
Start: 2024-04-08

## 2024-04-15 ENCOUNTER — HOSPITAL ENCOUNTER (OUTPATIENT)
Dept: RADIOLOGY | Facility: HOSPITAL | Age: 74
Discharge: HOME | End: 2024-04-15
Payer: MEDICARE

## 2024-04-15 DIAGNOSIS — J44.9 COPD, SEVERE (MULTI): ICD-10-CM

## 2024-04-15 PROCEDURE — 71046 X-RAY EXAM CHEST 2 VIEWS: CPT

## 2024-04-15 PROCEDURE — 71046 X-RAY EXAM CHEST 2 VIEWS: CPT | Performed by: RADIOLOGY

## 2024-04-17 ENCOUNTER — PATIENT MESSAGE (OUTPATIENT)
Dept: PRIMARY CARE | Facility: CLINIC | Age: 74
End: 2024-04-17
Payer: MEDICARE

## 2024-04-17 DIAGNOSIS — I50.33 ACUTE ON CHRONIC CONGESTIVE HEART FAILURE WITH RIGHT VENTRICULAR DIASTOLIC DYSFUNCTION (MULTI): Primary | ICD-10-CM

## 2024-04-17 DIAGNOSIS — I50.82 ACUTE ON CHRONIC CONGESTIVE HEART FAILURE WITH RIGHT VENTRICULAR DIASTOLIC DYSFUNCTION (MULTI): Primary | ICD-10-CM

## 2024-04-17 DIAGNOSIS — J96.21 ACUTE ON CHRONIC RESPIRATORY FAILURE WITH HYPOXIA (MULTI): ICD-10-CM

## 2024-04-21 ENCOUNTER — APPOINTMENT (OUTPATIENT)
Dept: CARDIOLOGY | Facility: HOSPITAL | Age: 74
DRG: 286 | End: 2024-04-21
Payer: MEDICARE

## 2024-04-21 ENCOUNTER — APPOINTMENT (OUTPATIENT)
Dept: RADIOLOGY | Facility: HOSPITAL | Age: 74
DRG: 286 | End: 2024-04-21
Payer: MEDICARE

## 2024-04-21 ENCOUNTER — APPOINTMENT (OUTPATIENT)
Dept: DIALYSIS | Facility: HOSPITAL | Age: 74
End: 2024-04-21
Payer: MEDICARE

## 2024-04-21 ENCOUNTER — HOSPITAL ENCOUNTER (INPATIENT)
Facility: HOSPITAL | Age: 74
LOS: 6 days | Discharge: HOME | DRG: 286 | End: 2024-04-27
Attending: STUDENT IN AN ORGANIZED HEALTH CARE EDUCATION/TRAINING PROGRAM | Admitting: INTERNAL MEDICINE
Payer: MEDICARE

## 2024-04-21 DIAGNOSIS — E87.70 HYPERVOLEMIA, UNSPECIFIED HYPERVOLEMIA TYPE: ICD-10-CM

## 2024-04-21 DIAGNOSIS — I27.81 COR PULMONALE (CHRONIC) (MULTI): ICD-10-CM

## 2024-04-21 DIAGNOSIS — I48.0 PAROXYSMAL A-FIB (MULTI): ICD-10-CM

## 2024-04-21 DIAGNOSIS — R94.31 ABNORMAL EKG: ICD-10-CM

## 2024-04-21 DIAGNOSIS — J18.9 PNEUMONIA DUE TO INFECTIOUS ORGANISM, UNSPECIFIED LATERALITY, UNSPECIFIED PART OF LUNG: ICD-10-CM

## 2024-04-21 DIAGNOSIS — R06.09 DYSPNEA ON EXERTION: ICD-10-CM

## 2024-04-21 DIAGNOSIS — R94.39 ABNORMAL STRESS TEST: ICD-10-CM

## 2024-04-21 DIAGNOSIS — I48.92 ATRIAL FLUTTER, PAROXYSMAL (MULTI): ICD-10-CM

## 2024-04-21 DIAGNOSIS — E87.79 OTHER HYPERVOLEMIA: ICD-10-CM

## 2024-04-21 DIAGNOSIS — J44.1 COPD EXACERBATION (MULTI): Primary | ICD-10-CM

## 2024-04-21 DIAGNOSIS — J44.9 COPD, SEVERE (MULTI): ICD-10-CM

## 2024-04-21 DIAGNOSIS — N18.6 ESRD (END STAGE RENAL DISEASE) (MULTI): ICD-10-CM

## 2024-04-21 DIAGNOSIS — I50.31 ACUTE DIASTOLIC HEART FAILURE (MULTI): ICD-10-CM

## 2024-04-21 DIAGNOSIS — J44.1 COPD WITH ACUTE EXACERBATION (MULTI): ICD-10-CM

## 2024-04-21 DIAGNOSIS — Z99.2 HEMODIALYSIS PATIENT (CMS-HCC): ICD-10-CM

## 2024-04-21 PROBLEM — D64.9 ANEMIA: Status: ACTIVE | Noted: 2023-09-21

## 2024-04-21 PROBLEM — H57.02 ANISOCORIA: Status: ACTIVE | Noted: 2024-04-21

## 2024-04-21 PROBLEM — J96.22 ACUTE ON CHRONIC RESPIRATORY FAILURE WITH HYPOXIA AND HYPERCAPNIA (MULTI): Status: ACTIVE | Noted: 2020-05-27

## 2024-04-21 LAB
ALBUMIN SERPL BCP-MCNC: 4 G/DL (ref 3.4–5)
ALP SERPL-CCNC: 59 U/L (ref 33–136)
ALT SERPL W P-5'-P-CCNC: 14 U/L (ref 10–52)
ANION GAP BLDV CALCULATED.4IONS-SCNC: 10 MMOL/L (ref 10–25)
ANION GAP BLDV CALCULATED.4IONS-SCNC: 13 MMOL/L (ref 10–25)
ANION GAP SERPL CALC-SCNC: 11 MMOL/L (ref 10–20)
AST SERPL W P-5'-P-CCNC: 12 U/L (ref 9–39)
BASE EXCESS BLDV CALC-SCNC: -1.4 MMOL/L (ref -2–3)
BASE EXCESS BLDV CALC-SCNC: 0.1 MMOL/L (ref -2–3)
BASE EXCESS BLDV CALC-SCNC: 2.5 MMOL/L (ref -2–3)
BASOPHILS # BLD AUTO: 0.06 X10*3/UL (ref 0–0.1)
BASOPHILS NFR BLD AUTO: 0.5 %
BILIRUB SERPL-MCNC: 0.4 MG/DL (ref 0–1.2)
BNP SERPL-MCNC: 1040 PG/ML (ref 0–99)
BODY TEMPERATURE: 37 DEGREES CELSIUS
BUN SERPL-MCNC: 42 MG/DL (ref 6–23)
CA-I BLDV-SCNC: 1.18 MMOL/L (ref 1.1–1.33)
CA-I BLDV-SCNC: 1.19 MMOL/L (ref 1.1–1.33)
CALCIUM SERPL-MCNC: 8.8 MG/DL (ref 8.6–10.3)
CARDIAC TROPONIN I PNL SERPL HS: 33 NG/L (ref 0–20)
CARDIAC TROPONIN I PNL SERPL HS: 38 NG/L (ref 0–20)
CHLORIDE BLDV-SCNC: 96 MMOL/L (ref 98–107)
CHLORIDE BLDV-SCNC: 98 MMOL/L (ref 98–107)
CHLORIDE SERPL-SCNC: 98 MMOL/L (ref 98–107)
CO2 SERPL-SCNC: 31 MMOL/L (ref 21–32)
CREAT SERPL-MCNC: 5.87 MG/DL (ref 0.5–1.3)
EGFRCR SERPLBLD CKD-EPI 2021: 9 ML/MIN/1.73M*2
EOSINOPHIL # BLD AUTO: 0.33 X10*3/UL (ref 0–0.4)
EOSINOPHIL NFR BLD AUTO: 2.8 %
ERYTHROCYTE [DISTWIDTH] IN BLOOD BY AUTOMATED COUNT: 15 % (ref 11.5–14.5)
FLUAV RNA RESP QL NAA+PROBE: NOT DETECTED
FLUBV RNA RESP QL NAA+PROBE: NOT DETECTED
GLUCOSE BLDV-MCNC: 101 MG/DL (ref 74–99)
GLUCOSE BLDV-MCNC: 110 MG/DL (ref 74–99)
GLUCOSE SERPL-MCNC: 112 MG/DL (ref 74–99)
HCO3 BLDV-SCNC: 28.5 MMOL/L (ref 22–26)
HCO3 BLDV-SCNC: 31.2 MMOL/L (ref 22–26)
HCO3 BLDV-SCNC: 31.3 MMOL/L (ref 22–26)
HCT VFR BLD AUTO: 30 % (ref 41–52)
HCT VFR BLD EST: 23 % (ref 41–52)
HCT VFR BLD EST: 31 % (ref 41–52)
HGB BLD-MCNC: 9.1 G/DL (ref 13.5–17.5)
HGB BLDV-MCNC: 10.3 G/DL (ref 13.5–17.5)
HGB BLDV-MCNC: 7.7 G/DL (ref 13.5–17.5)
IMM GRANULOCYTES # BLD AUTO: 0.06 X10*3/UL (ref 0–0.5)
IMM GRANULOCYTES NFR BLD AUTO: 0.5 % (ref 0–0.9)
INHALED O2 CONCENTRATION: 40 %
INHALED O2 CONCENTRATION: 44 %
INHALED O2 CONCENTRATION: 50 %
LACTATE BLDV-SCNC: 0.4 MMOL/L (ref 0.4–2)
LACTATE BLDV-SCNC: 0.4 MMOL/L (ref 0.4–2)
LACTATE SERPL-SCNC: 0.3 MMOL/L (ref 0.4–2)
LIPASE SERPL-CCNC: 46 U/L (ref 9–82)
LYMPHOCYTES # BLD AUTO: 1.06 X10*3/UL (ref 0.8–3)
LYMPHOCYTES NFR BLD AUTO: 9 %
MAGNESIUM SERPL-MCNC: 1.68 MG/DL (ref 1.6–2.4)
MCH RBC QN AUTO: 30.5 PG (ref 26–34)
MCHC RBC AUTO-ENTMCNC: 30.3 G/DL (ref 32–36)
MCV RBC AUTO: 101 FL (ref 80–100)
MONOCYTES # BLD AUTO: 1.36 X10*3/UL (ref 0.05–0.8)
MONOCYTES NFR BLD AUTO: 11.5 %
MRSA DNA SPEC QL NAA+PROBE: NOT DETECTED
NEUTROPHILS # BLD AUTO: 8.93 X10*3/UL (ref 1.6–5.5)
NEUTROPHILS NFR BLD AUTO: 75.7 %
NRBC BLD-RTO: 0 /100 WBCS (ref 0–0)
OXYHGB MFR BLDV: 51.7 % (ref 45–75)
OXYHGB MFR BLDV: 53.5 % (ref 45–75)
OXYHGB MFR BLDV: 65.8 % (ref 45–75)
PCO2 BLDV: 73 MM HG (ref 41–51)
PCO2 BLDV: 80 MM HG (ref 41–51)
PCO2 BLDV: 96 MM HG (ref 41–51)
PH BLDV: 7.12 PH (ref 7.33–7.43)
PH BLDV: 7.2 PH (ref 7.33–7.43)
PH BLDV: 7.2 PH (ref 7.33–7.43)
PLATELET # BLD AUTO: 196 X10*3/UL (ref 150–450)
PO2 BLDV: 32 MM HG (ref 35–45)
PO2 BLDV: 34 MM HG (ref 35–45)
PO2 BLDV: 40 MM HG (ref 35–45)
POTASSIUM BLDV-SCNC: 4.8 MMOL/L (ref 3.5–5.3)
POTASSIUM BLDV-SCNC: 4.9 MMOL/L (ref 3.5–5.3)
POTASSIUM SERPL-SCNC: 4.8 MMOL/L (ref 3.5–5.3)
PROT SERPL-MCNC: 6.3 G/DL (ref 6.4–8.2)
RBC # BLD AUTO: 2.98 X10*6/UL (ref 4.5–5.9)
SAO2 % BLDV: 53 % (ref 45–75)
SAO2 % BLDV: 55 % (ref 45–75)
SAO2 % BLDV: 67 % (ref 45–75)
SARS-COV-2 RNA RESP QL NAA+PROBE: NOT DETECTED
SODIUM BLDV-SCNC: 134 MMOL/L (ref 136–145)
SODIUM BLDV-SCNC: 135 MMOL/L (ref 136–145)
SODIUM SERPL-SCNC: 135 MMOL/L (ref 136–145)
WBC # BLD AUTO: 11.8 X10*3/UL (ref 4.4–11.3)

## 2024-04-21 PROCEDURE — 84484 ASSAY OF TROPONIN QUANT: CPT | Performed by: STUDENT IN AN ORGANIZED HEALTH CARE EDUCATION/TRAINING PROGRAM

## 2024-04-21 PROCEDURE — 71045 X-RAY EXAM CHEST 1 VIEW: CPT | Performed by: RADIOLOGY

## 2024-04-21 PROCEDURE — 96367 TX/PROPH/DG ADDL SEQ IV INF: CPT

## 2024-04-21 PROCEDURE — 85025 COMPLETE CBC W/AUTO DIFF WBC: CPT | Performed by: STUDENT IN AN ORGANIZED HEALTH CARE EDUCATION/TRAINING PROGRAM

## 2024-04-21 PROCEDURE — 94660 CPAP INITIATION&MGMT: CPT

## 2024-04-21 PROCEDURE — 83605 ASSAY OF LACTIC ACID: CPT | Performed by: STUDENT IN AN ORGANIZED HEALTH CARE EDUCATION/TRAINING PROGRAM

## 2024-04-21 PROCEDURE — 93005 ELECTROCARDIOGRAM TRACING: CPT

## 2024-04-21 PROCEDURE — 36415 COLL VENOUS BLD VENIPUNCTURE: CPT | Performed by: STUDENT IN AN ORGANIZED HEALTH CARE EDUCATION/TRAINING PROGRAM

## 2024-04-21 PROCEDURE — 96365 THER/PROPH/DIAG IV INF INIT: CPT

## 2024-04-21 PROCEDURE — 2500000004 HC RX 250 GENERAL PHARMACY W/ HCPCS (ALT 636 FOR OP/ED)

## 2024-04-21 PROCEDURE — 2500000001 HC RX 250 WO HCPCS SELF ADMINISTERED DRUGS (ALT 637 FOR MEDICARE OP): Performed by: NURSE PRACTITIONER

## 2024-04-21 PROCEDURE — 96366 THER/PROPH/DIAG IV INF ADDON: CPT

## 2024-04-21 PROCEDURE — 87502 INFLUENZA DNA AMP PROBE: CPT | Performed by: STUDENT IN AN ORGANIZED HEALTH CARE EDUCATION/TRAINING PROGRAM

## 2024-04-21 PROCEDURE — 99291 CRITICAL CARE FIRST HOUR: CPT | Performed by: STUDENT IN AN ORGANIZED HEALTH CARE EDUCATION/TRAINING PROGRAM

## 2024-04-21 PROCEDURE — 82805 BLOOD GASES W/O2 SATURATION: CPT | Performed by: NURSE PRACTITIONER

## 2024-04-21 PROCEDURE — 83880 ASSAY OF NATRIURETIC PEPTIDE: CPT | Performed by: STUDENT IN AN ORGANIZED HEALTH CARE EDUCATION/TRAINING PROGRAM

## 2024-04-21 PROCEDURE — 87641 MR-STAPH DNA AMP PROBE: CPT | Performed by: STUDENT IN AN ORGANIZED HEALTH CARE EDUCATION/TRAINING PROGRAM

## 2024-04-21 PROCEDURE — 96375 TX/PRO/DX INJ NEW DRUG ADDON: CPT

## 2024-04-21 PROCEDURE — 71045 X-RAY EXAM CHEST 1 VIEW: CPT

## 2024-04-21 PROCEDURE — 2060000001 HC INTERMEDIATE ICU ROOM DAILY

## 2024-04-21 PROCEDURE — 83735 ASSAY OF MAGNESIUM: CPT | Performed by: STUDENT IN AN ORGANIZED HEALTH CARE EDUCATION/TRAINING PROGRAM

## 2024-04-21 PROCEDURE — 84132 ASSAY OF SERUM POTASSIUM: CPT | Performed by: STUDENT IN AN ORGANIZED HEALTH CARE EDUCATION/TRAINING PROGRAM

## 2024-04-21 PROCEDURE — 87040 BLOOD CULTURE FOR BACTERIA: CPT | Mod: PORLAB | Performed by: STUDENT IN AN ORGANIZED HEALTH CARE EDUCATION/TRAINING PROGRAM

## 2024-04-21 PROCEDURE — 2500000004 HC RX 250 GENERAL PHARMACY W/ HCPCS (ALT 636 FOR OP/ED): Performed by: STUDENT IN AN ORGANIZED HEALTH CARE EDUCATION/TRAINING PROGRAM

## 2024-04-21 PROCEDURE — 99223 1ST HOSP IP/OBS HIGH 75: CPT | Performed by: NURSE PRACTITIONER

## 2024-04-21 PROCEDURE — 83690 ASSAY OF LIPASE: CPT | Performed by: STUDENT IN AN ORGANIZED HEALTH CARE EDUCATION/TRAINING PROGRAM

## 2024-04-21 PROCEDURE — 2500000004 HC RX 250 GENERAL PHARMACY W/ HCPCS (ALT 636 FOR OP/ED): Performed by: NURSE PRACTITIONER

## 2024-04-21 PROCEDURE — 76937 US GUIDE VASCULAR ACCESS: CPT

## 2024-04-21 PROCEDURE — 8010000001 HC DIALYSIS - HEMODIALYSIS PER DAY

## 2024-04-21 PROCEDURE — 70450 CT HEAD/BRAIN W/O DYE: CPT | Performed by: RADIOLOGY

## 2024-04-21 PROCEDURE — 70450 CT HEAD/BRAIN W/O DYE: CPT

## 2024-04-21 RX ORDER — IPRATROPIUM BROMIDE AND ALBUTEROL SULFATE 2.5; .5 MG/3ML; MG/3ML
3 SOLUTION RESPIRATORY (INHALATION)
Status: DISCONTINUED | OUTPATIENT
Start: 2024-04-21 | End: 2024-04-21

## 2024-04-21 RX ORDER — CEFEPIME 1 G/50ML
2 INJECTION, SOLUTION INTRAVENOUS EVERY 24 HOURS
Status: DISCONTINUED | OUTPATIENT
Start: 2024-04-21 | End: 2024-04-21

## 2024-04-21 RX ORDER — CEFEPIME 1 G/50ML
1 INJECTION, SOLUTION INTRAVENOUS EVERY 24 HOURS
Status: DISCONTINUED | OUTPATIENT
Start: 2024-04-22 | End: 2024-04-24

## 2024-04-21 RX ORDER — CEFEPIME 1 G/50ML
2 INJECTION, SOLUTION INTRAVENOUS ONCE
Status: COMPLETED | OUTPATIENT
Start: 2024-04-21 | End: 2024-04-21

## 2024-04-21 RX ORDER — MONTELUKAST SODIUM 10 MG/1
10 TABLET ORAL NIGHTLY
Status: DISCONTINUED | OUTPATIENT
Start: 2024-04-21 | End: 2024-04-27 | Stop reason: HOSPADM

## 2024-04-21 RX ORDER — IPRATROPIUM BROMIDE AND ALBUTEROL SULFATE 2.5; .5 MG/3ML; MG/3ML
3 SOLUTION RESPIRATORY (INHALATION) 3 TIMES DAILY
Status: DISCONTINUED | OUTPATIENT
Start: 2024-04-22 | End: 2024-04-23

## 2024-04-21 RX ORDER — ERGOCALCIFEROL 1.25 MG/1
1 CAPSULE ORAL
COMMUNITY

## 2024-04-21 RX ORDER — VANCOMYCIN HYDROCHLORIDE 1 G/20ML
INJECTION, POWDER, LYOPHILIZED, FOR SOLUTION INTRAVENOUS DAILY PRN
Status: DISCONTINUED | OUTPATIENT
Start: 2024-04-21 | End: 2024-04-21

## 2024-04-21 RX ORDER — FLUTICASONE FUROATE AND VILANTEROL 100; 25 UG/1; UG/1
1 POWDER RESPIRATORY (INHALATION)
Status: DISCONTINUED | OUTPATIENT
Start: 2024-04-21 | End: 2024-04-27 | Stop reason: HOSPADM

## 2024-04-21 RX ORDER — ATORVASTATIN CALCIUM 40 MG/1
40 TABLET, FILM COATED ORAL NIGHTLY
Status: DISCONTINUED | OUTPATIENT
Start: 2024-04-21 | End: 2024-04-27 | Stop reason: HOSPADM

## 2024-04-21 RX ORDER — CARVEDILOL 6.25 MG/1
6.25 TABLET ORAL
Status: DISCONTINUED | OUTPATIENT
Start: 2024-04-21 | End: 2024-04-27 | Stop reason: HOSPADM

## 2024-04-21 RX ORDER — METHIMAZOLE 5 MG/1
5 TABLET ORAL DAILY
Status: DISCONTINUED | OUTPATIENT
Start: 2024-04-21 | End: 2024-04-27 | Stop reason: HOSPADM

## 2024-04-21 RX ADMIN — METHYLPREDNISOLONE SODIUM SUCCINATE 125 MG: 125 INJECTION, POWDER, FOR SOLUTION INTRAMUSCULAR; INTRAVENOUS at 13:35

## 2024-04-21 RX ADMIN — DOXYCYCLINE 100 MG: 100 INJECTION, POWDER, LYOPHILIZED, FOR SOLUTION INTRAVENOUS at 17:24

## 2024-04-21 RX ADMIN — VANCOMYCIN HYDROCHLORIDE 2 G: 10 INJECTION, POWDER, LYOPHILIZED, FOR SOLUTION INTRAVENOUS at 13:39

## 2024-04-21 RX ADMIN — CARVEDILOL 6.25 MG: 6.25 TABLET, FILM COATED ORAL at 17:31

## 2024-04-21 RX ADMIN — ATORVASTATIN CALCIUM 40 MG: 40 TABLET, FILM COATED ORAL at 20:53

## 2024-04-21 RX ADMIN — MONTELUKAST 10 MG: 10 TABLET, FILM COATED ORAL at 20:53

## 2024-04-21 RX ADMIN — CEFEPIME 2 G: 1 INJECTION, SOLUTION INTRAVENOUS at 12:53

## 2024-04-21 RX ADMIN — APIXABAN 5 MG: 5 TABLET, FILM COATED ORAL at 17:31

## 2024-04-21 RX ADMIN — METHYLPREDNISOLONE SODIUM SUCCINATE 40 MG: 40 INJECTION, POWDER, FOR SOLUTION INTRAMUSCULAR; INTRAVENOUS at 20:53

## 2024-04-21 SDOH — SOCIAL STABILITY: SOCIAL INSECURITY: WERE YOU ABLE TO COMPLETE ALL THE BEHAVIORAL HEALTH SCREENINGS?: YES

## 2024-04-21 SDOH — SOCIAL STABILITY: SOCIAL INSECURITY: HAS ANYONE EVER THREATENED TO HURT YOUR FAMILY OR YOUR PETS?: NO

## 2024-04-21 SDOH — SOCIAL STABILITY: SOCIAL INSECURITY: HAVE YOU HAD THOUGHTS OF HARMING ANYONE ELSE?: NO

## 2024-04-21 SDOH — SOCIAL STABILITY: SOCIAL INSECURITY: ABUSE: ADULT

## 2024-04-21 SDOH — SOCIAL STABILITY: SOCIAL INSECURITY: DO YOU FEEL ANYONE HAS EXPLOITED OR TAKEN ADVANTAGE OF YOU FINANCIALLY OR OF YOUR PERSONAL PROPERTY?: NO

## 2024-04-21 SDOH — SOCIAL STABILITY: SOCIAL INSECURITY: DOES ANYONE TRY TO KEEP YOU FROM HAVING/CONTACTING OTHER FRIENDS OR DOING THINGS OUTSIDE YOUR HOME?: NO

## 2024-04-21 SDOH — SOCIAL STABILITY: SOCIAL INSECURITY: ARE THERE ANY APPARENT SIGNS OF INJURIES/BEHAVIORS THAT COULD BE RELATED TO ABUSE/NEGLECT?: NO

## 2024-04-21 SDOH — SOCIAL STABILITY: SOCIAL INSECURITY: DO YOU FEEL UNSAFE GOING BACK TO THE PLACE WHERE YOU ARE LIVING?: NO

## 2024-04-21 SDOH — SOCIAL STABILITY: SOCIAL INSECURITY: ARE YOU OR HAVE YOU BEEN THREATENED OR ABUSED PHYSICALLY, EMOTIONALLY, OR SEXUALLY BY ANYONE?: NO

## 2024-04-21 SDOH — SOCIAL STABILITY: SOCIAL INSECURITY: HAVE YOU HAD ANY THOUGHTS OF HARMING ANYONE ELSE?: NO

## 2024-04-21 ASSESSMENT — ENCOUNTER SYMPTOMS
WOUND: 0
BACK PAIN: 0
SORE THROAT: 0
POLYDIPSIA: 0
ABDOMINAL DISTENTION: 0
HEADACHES: 0
PALPITATIONS: 0
HEMATURIA: 0
DYSPHORIC MOOD: 0
POLYPHAGIA: 0
LIGHT-HEADEDNESS: 0
SLEEP DISTURBANCE: 0
DIARRHEA: 0
FATIGUE: 1
NECK STIFFNESS: 0
EYE PAIN: 0
EYE ITCHING: 0
APNEA: 0
EYE DISCHARGE: 0
VOMITING: 0
FLANK PAIN: 0
CHEST TIGHTNESS: 1
BRUISES/BLEEDS EASILY: 0
NUMBNESS: 0
ABDOMINAL PAIN: 0
SEIZURES: 0
UNEXPECTED WEIGHT CHANGE: 0
SHORTNESS OF BREATH: 1
CHOKING: 0
COLOR CHANGE: 0
ADENOPATHY: 0
FEVER: 0
BLOOD IN STOOL: 0
TREMORS: 0
SINUS PAIN: 0
HALLUCINATIONS: 0
CHILLS: 1
CONFUSION: 0
WHEEZING: 1
WEAKNESS: 0
NAUSEA: 1
DYSURIA: 0
FREQUENCY: 0
COUGH: 0
APPETITE CHANGE: 0
ARTHRALGIAS: 0
NECK PAIN: 0
MYALGIAS: 0
DIZZINESS: 0
PHOTOPHOBIA: 0
VOICE CHANGE: 0
CONSTIPATION: 0
TROUBLE SWALLOWING: 0
NERVOUS/ANXIOUS: 0
SPEECH DIFFICULTY: 0

## 2024-04-21 ASSESSMENT — PAIN SCALES - GENERAL
PAINLEVEL_OUTOF10: 0 - NO PAIN
PAINLEVEL_OUTOF10: 4
PAINLEVEL_OUTOF10: 0 - NO PAIN

## 2024-04-21 ASSESSMENT — COGNITIVE AND FUNCTIONAL STATUS - GENERAL
WALKING IN HOSPITAL ROOM: A LOT
PATIENT BASELINE BEDBOUND: NO
TURNING FROM BACK TO SIDE WHILE IN FLAT BAD: A LITTLE
DRESSING REGULAR UPPER BODY CLOTHING: A LITTLE
PERSONAL GROOMING: A LITTLE
HELP NEEDED FOR BATHING: A LITTLE
TURNING FROM BACK TO SIDE WHILE IN FLAT BAD: A LITTLE
DAILY ACTIVITIY SCORE: 18
TOILETING: A LITTLE
MOVING TO AND FROM BED TO CHAIR: A LITTLE
MOBILITY SCORE: 16
CLIMB 3 TO 5 STEPS WITH RAILING: TOTAL
DRESSING REGULAR LOWER BODY CLOTHING: A LITTLE
DRESSING REGULAR LOWER BODY CLOTHING: A LITTLE
EATING MEALS: A LITTLE
EATING MEALS: A LITTLE
STANDING UP FROM CHAIR USING ARMS: A LITTLE
TOILETING: A LITTLE
MOBILITY SCORE: 16
WALKING IN HOSPITAL ROOM: A LOT
DAILY ACTIVITIY SCORE: 18
MOVING TO AND FROM BED TO CHAIR: A LITTLE
HELP NEEDED FOR BATHING: A LITTLE
DRESSING REGULAR UPPER BODY CLOTHING: A LITTLE
CLIMB 3 TO 5 STEPS WITH RAILING: TOTAL
PERSONAL GROOMING: A LITTLE
STANDING UP FROM CHAIR USING ARMS: A LITTLE

## 2024-04-21 ASSESSMENT — ACTIVITIES OF DAILY LIVING (ADL)
BATHING: INDEPENDENT
JUDGMENT_ADEQUATE_SAFELY_COMPLETE_DAILY_ACTIVITIES: YES
PATIENT'S MEMORY ADEQUATE TO SAFELY COMPLETE DAILY ACTIVITIES?: YES
WALKS IN HOME: INDEPENDENT
HEARING - LEFT EAR: FUNCTIONAL
TOILETING: INDEPENDENT
HEARING - RIGHT EAR: FUNCTIONAL
DRESSING YOURSELF: INDEPENDENT
LACK_OF_TRANSPORTATION: NO
ADEQUATE_TO_COMPLETE_ADL: YES
FEEDING YOURSELF: INDEPENDENT
GROOMING: INDEPENDENT

## 2024-04-21 ASSESSMENT — PAIN - FUNCTIONAL ASSESSMENT
PAIN_FUNCTIONAL_ASSESSMENT: 0-10
PAIN_FUNCTIONAL_ASSESSMENT: NO/DENIES PAIN
PAIN_FUNCTIONAL_ASSESSMENT: 0-10
PAIN_FUNCTIONAL_ASSESSMENT: 0-10

## 2024-04-21 ASSESSMENT — COLUMBIA-SUICIDE SEVERITY RATING SCALE - C-SSRS
2. HAVE YOU ACTUALLY HAD ANY THOUGHTS OF KILLING YOURSELF?: NO
1. IN THE PAST MONTH, HAVE YOU WISHED YOU WERE DEAD OR WISHED YOU COULD GO TO SLEEP AND NOT WAKE UP?: NO
6. HAVE YOU EVER DONE ANYTHING, STARTED TO DO ANYTHING, OR PREPARED TO DO ANYTHING TO END YOUR LIFE?: NO

## 2024-04-21 ASSESSMENT — PAIN DESCRIPTION - LOCATION: LOCATION: ABDOMEN

## 2024-04-21 ASSESSMENT — LIFESTYLE VARIABLES
EVER HAD A DRINK FIRST THING IN THE MORNING TO STEADY YOUR NERVES TO GET RID OF A HANGOVER: NO
HOW OFTEN DO YOU HAVE 6 OR MORE DRINKS ON ONE OCCASION: PATIENT DECLINED
HOW OFTEN DO YOU HAVE A DRINK CONTAINING ALCOHOL: PATIENT DECLINED
HAVE YOU EVER FELT YOU SHOULD CUT DOWN ON YOUR DRINKING: NO
AUDIT-C TOTAL SCORE: -1
HAVE PEOPLE ANNOYED YOU BY CRITICIZING YOUR DRINKING: NO
HOW MANY STANDARD DRINKS CONTAINING ALCOHOL DO YOU HAVE ON A TYPICAL DAY: PATIENT DECLINED
EVER FELT BAD OR GUILTY ABOUT YOUR DRINKING: NO
TOTAL SCORE: 0
SKIP TO QUESTIONS 9-10: 0
AUDIT-C TOTAL SCORE: -1

## 2024-04-21 ASSESSMENT — PAIN DESCRIPTION - PAIN TYPE: TYPE: ACUTE PAIN

## 2024-04-21 ASSESSMENT — PATIENT HEALTH QUESTIONNAIRE - PHQ9
SUM OF ALL RESPONSES TO PHQ9 QUESTIONS 1 & 2: 0
2. FEELING DOWN, DEPRESSED OR HOPELESS: NOT AT ALL
1. LITTLE INTEREST OR PLEASURE IN DOING THINGS: NOT AT ALL

## 2024-04-21 ASSESSMENT — PAIN DESCRIPTION - DESCRIPTORS: DESCRIPTORS: CRAMPING

## 2024-04-21 ASSESSMENT — PAIN DESCRIPTION - ORIENTATION: ORIENTATION: LEFT

## 2024-04-21 NOTE — Clinical Note
Closure device placed in the right radial artery. Site closed by radial compression system. 12cc air

## 2024-04-21 NOTE — PROGRESS NOTES
Renard Ward is a 73 y.o. male admitted for COPD exacerbation (Multi). Pharmacy reviewed the patient's [id number] medications and allergies for accuracy.    The list below reflects the PTA list prior to pharmacy medication history. A summary a changes to the PTA medication list has been listed below. Please review each medication in order reconciliation for additional clarification and justification.    Source of information:  SPOUSE    Medications added:  VITAMIN D2 1.25MG Q WEEK ON THURSDAYS    Medications modified:    Medications to be removed:  AZITHROMYCIN  PREDNISONE  LOSARTAN 100MG    Medications of concern:      Prior to Admission Medications   Prescriptions Last Dose Informant Patient Reported? Taking?   Eliquis 5 mg tablet   Yes No   Sig: Take 1 tablet (5 mg) by mouth 2 times a day.   Mary Grace-Darryl Rx 1- mg-mg-mcg tablet   Yes No   Sig: Take 1 tablet by mouth once daily.   atorvastatin (Lipitor) 40 mg tablet   No No   Sig: Take 1 tablet (40 mg) by mouth once daily at bedtime.   azithromycin (Zithromax) 250 mg tablet   No No   Sig: Take 2 tabs (500 mg) by mouth today, than 1 daily for 4 days.   carvedilol (Coreg) 6.25 mg tablet   Yes No   Sig: Take 1 tablet (6.25 mg) by mouth 2 times a day with meals.   cyanocobalamin, vitamin B-12, (Vitamin B-12) 1,000 mcg tablet extended release   No No   Sig: Take 1 tablet (1,000 mcg) by mouth once daily.   famotidine (Pepcid) 40 mg tablet   No No   Sig: Take 1 tablet (40 mg) by mouth 2 times a day.   fluticasone-umeclidin-vilanter (Trelegy Ellipta) 100-62.5-25 mcg blister with device   No No   Sig: Inhale 1 puff once daily.   losartan (Cozaar) 100 mg tablet   Yes No   Sig: Take 1 tablet (100 mg) by mouth once daily.   methIMAzole (Tapazole) 5 mg tablet   No No   Sig: Take 1 tablet (5 mg) by mouth once daily.   montelukast (Singulair) 10 mg tablet   No No   Sig: TAKE ONE TABLET BY MOUTH DAILY AT BEDTIME   predniSONE (Deltasone) 20 mg tablet   No No   Sig:  Take 3 tabs (60mg) daily for 3 days, then take 2 tabs (40mg) daily for 3 days, then take 1 tab (20mg) daily for 3 days.   torsemide (Demadex) 20 mg tablet   Yes No   Sig: Take 1 tablet (20 mg) by mouth twice a day.      Facility-Administered Medications: None       Christin Nam

## 2024-04-21 NOTE — CARE PLAN
Consult noted.   Per discussion w admitting team: Pt in vol overload w hypercapneic resp failure needing BiPAP.   Will plan on a short HD session today to help w vol overload.  Tx of PNA, COPD exacerbation per primary.   Pt to be seen and full note to follow.    Please call w ?s.

## 2024-04-21 NOTE — ED PROVIDER NOTES
HPI   Chief Complaint   Patient presents with    Shortness of Breath    Abdominal Pain     Left side of abdomen pain.        73-year-old male past medical history of COPD, ESRD, hypertension and atrial flutter with 2 ablations presents with department for shortness of breath was hospitalized in March for pneumonia. Patient states that he does dialysis at home Monday through Friday through his catheter.  He states he began feeling short of breath on Saturday.  He has been coughing more.  Denies any nausea or vomit has not been insulin.  No chest pain he states that he is on oxygen but EMS stated that he is on oxygen and he is usually on 5 L.  No other issues or concerns otherwise.                          [place] Coma Scale Score: 15                  Patient History   Past Medical History:   Diagnosis Date    Abnormal level of blood mineral 02/22/2021    Abnormal level of blood mineral    Acute diastolic (congestive) heart failure (Multi) 06/18/2020    Acute diastolic congestive heart failure    Acute kidney failure, unspecified (CMS-HCC) 06/16/2020    ALMAS (acute kidney injury)    Acute respiratory failure with hypoxia (Multi) 03/11/2020    Acute respiratory failure with hypoxia    Cellulitis of left toe 08/20/2020    Cellulitis of great toe of left foot    Chronic obstructive pulmonary disease, unspecified (Multi) 09/21/2022    COPD, severe    Chronic respiratory failure with hypoxia (Multi) 09/21/2023    Cor pulmonale (chronic) (Multi) 01/03/2022    Cor pulmonale, chronic    Encounter for screening for malignant neoplasm of colon 10/19/2022    Colon cancer screening    Infective pericarditis ([facility]) 06/16/2020    Acute viral pericarditis    Iron deficiency anemia secondary to blood loss (chronic) 11/19/2020    Iron deficiency anemia due to chronic blood loss    Nicotine dependence, unspecified, uncomplicated 01/14/2020    Needs smoking cessation education    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    Nontoxic  multinodular goiter    Obstructive sleep apnea (adult) (pediatric) 03/11/2020    LEO and COPD overlap syndrome    Other allergic rhinitis 02/23/2021    Other allergic rhinitis    Other fecal abnormalities 07/30/2021    Positive occult stool blood test    Other hypersomnia 01/23/2020    Excessive daytime sleepiness    Other hypersomnia 01/23/2020    Daytime hypersomnolence    Other ill-defined heart diseases 04/27/2020    Grade I diastolic dysfunction    Other pericardial effusion (noninflammatory) ([facility]) 06/18/2020    Pericardial effusion without cardiac tamponade    Other specified personal risk factors, not elsewhere classified 07/30/2020    At risk for amiodarone toxicity with long term use    Other specified symptoms and signs involving the circulatory and respiratory systems 10/10/2019    Bruit of left carotid artery    Personal history of diseases of the skin and subcutaneous tissue 11/19/2020    History of psoriasis    Personal history of nicotine dependence 08/18/2021    History of tobacco use disorder    Personal history of other diseases of the digestive system     History of esophageal reflux    Personal history of other diseases of the nervous system and sense organs     History of obstructive sleep apnea    Personal history of other endocrine, nutritional and metabolic disease 11/19/2020    History of vitamin D deficiency    Personal history of other endocrine, nutritional and metabolic disease 10/14/2020    History of hyperlipidemia    Personal history of other endocrine, nutritional and metabolic disease 08/20/2020    History of hyperthyroidism    Personal history of other infectious and parasitic diseases 07/06/2021    History of Clostridioides difficile colitis    Personal history of other infectious and parasitic diseases 02/04/2021    History of scabies    Personal history of other specified conditions 04/12/2016    History of snoring    Personal history of other specified conditions 04/07/2016     History of dizziness    Personal history of transient ischemic attack (TIA), and cerebral infarction without residual deficits     History of stroke    Pleural effusion, not elsewhere classified 07/30/2020    Bilateral pleural effusion    Pleural effusion, not elsewhere classified 03/11/2020    Pleural effusion, left    Pneumonia due to Streptococcus pneumoniae (CMS-HCC) 03/11/2020    Pneumonia of both lower lobes due to Streptococcus pneumoniae    Post-traumatic stress disorder, unspecified     PTSD (post-traumatic stress disorder)    Rash and other nonspecific skin eruption 03/25/2021    Rash    Secondary polycythemia 01/23/2020    Polycythemia secondary to smoking    Tinea unguium 08/25/2020    Onychomycosis of toenail     Past Surgical History:   Procedure Laterality Date    APPENDECTOMY  04/07/2016    Appendectomy    IR CVC TUNNELED  6/28/2023    IR CVC TUNNELED 6/28/2023 POR ANGIO     Family History   Family history unknown: Yes     Social History     Tobacco Use    Smoking status: Former     Types: Cigarettes    Smokeless tobacco: Never   Vaping Use    Vaping status: Never Used   Substance Use Topics    Alcohol use: Not Currently    Drug use: Never       Physical Exam   ED Triage Vitals [04/21/24 1006]   Temperature Heart Rate Respirations BP   36.3 °C (97.4 °F) 83 20 129/61      Pulse Ox Temp Source Heart Rate Source Patient Position   (!) 87 % Temporal Monitor Sitting      BP Location FiO2 (%)     Left arm --       Physical Exam  Constitutional:       General: He is not in acute distress.  HENT:      Head: Normocephalic and atraumatic.      Right Ear: Tympanic membrane normal.      Left Ear: Tympanic membrane normal.      Mouth/Throat:      Mouth: Mucous membranes are moist.   Eyes:      Extraocular Movements: Extraocular movements intact.      Conjunctiva/sclera: Conjunctivae normal.      Pupils: Pupils are equal, round, and reactive to light.   Cardiovascular:      Rate and Rhythm: Normal rate and  regular rhythm.      Heart sounds: No murmur heard.  Pulmonary:      Effort: Pulmonary effort is normal. No respiratory distress.      Breath sounds: No stridor. Examination of the right-upper field reveals rhonchi. Examination of the left-upper field reveals rhonchi. Examination of the right-lower field reveals rhonchi. Examination of the left-lower field reveals rhonchi. Rhonchi present. No wheezing.   Abdominal:      General: Bowel sounds are normal. There is no distension.      Tenderness: There is no abdominal tenderness. There is no guarding or rebound.   Musculoskeletal:         General: No swelling, tenderness or deformity. Normal range of motion.   Skin:     General: Skin is warm and dry.      Coloration: Skin is not jaundiced.      Findings: No bruising or lesion.   Neurological:      General: No focal deficit present.      Mental Status: He is alert and oriented to person, place, and time. Mental status is at baseline.      Cranial Nerves: No cranial nerve deficit.      Motor: No weakness.   Psychiatric:         Mood and Affect: Mood normal.       Labs Reviewed - No data to display  No orders to display       ED Course & MDM   ED Course as of 04/21/24 1314   Sun Apr 21, 2024   1207 IMPRESSION:  1.  Progression of airspace disease.   [CF]   1217 Will not give patient more fluids since and concern for fluid overload [CF]      ED Course User Index  [CF] Alpa Paul MD       Medical Decision Making  73-year-old male past medical history of ESRD that he does at home Monday through Friday recent discharge within a month for pneumonia presents for shortness of breath.  EMS did give patient a DuoNeb treatment.  On my exam he had rhonchi.  His blood pressure within normal limits.  He is requiring nonrebreather usually on 5 L.  VBG did show he was CO2 retaining at this time I suspect this is mostly COPD exacerbation combination with pneumonia versus fluid overload.  He was placed on BiPAP and he does appear  more comfortable.  X-ray is concerning for possible pneumonia patient was given broad-spectrum antibiotics he does have a leukocytosis as well.  Will refrain from giving patient fluids due to his fluid overload status.  I do not believe the patient requires emergent dialysis at this time with his electrolytes within normal limits and him saturating well without issue on BiPAP.  Patient denies any chest pain his troponins are remained flat at around 30.          Procedure  Critical Care    Performed by: Alpa Paul MD  Authorized by: Alpa Paul MD    Critical care provider statement:     Critical care time (minutes):  30    Critical care time was exclusive of:  Separately billable procedures and treating other patients    Critical care was necessary to treat or prevent imminent or life-threatening deterioration of the following conditions:  Respiratory failure    Critical care was time spent personally by me on the following activities:  Blood draw for specimens, development of treatment plan with patient or surrogate, discussions with consultants, discussions with primary provider, evaluation of patient's response to treatment, ordering and performing treatments and interventions, ordering and review of laboratory studies, ordering and review of radiographic studies, pulse oximetry, re-evaluation of patient's condition and review of old charts    Care discussed with: accepting provider at another facility         Alpa Paul MD  04/21/24 2221

## 2024-04-21 NOTE — PRE-PROCEDURE NOTE
Report from Sending RN:    Report From: TRIPP EAGLE  Recent Surgery of Procedure: No  Baseline Level of Consciousness (LOC): X4  Oxygen Use: Yes  Type: NASAL  Diabetic: No  Last BP Med Given Day of Dialysis: SEE MAR  Last Pain Med Given: SEE MAR  Lab Tests to be Obtained with Dialysis: No  Blood Transfusion to be Given During Dialysis: No  Available IV Access: Yes  Medications to be Administered During Dialysis: No  Continuous IV Infusion Running: No  Restraints on Currently or in the Last 24 Hours: No  Hand-Off Communication: PT ALERT ,ON 4 LITERS,VSS  Dialysis Catheter Dressing: CLEAN, DRY AND INTACT  Last Dressing Change: 4/20/24

## 2024-04-22 ENCOUNTER — APPOINTMENT (OUTPATIENT)
Dept: DIALYSIS | Facility: HOSPITAL | Age: 74
End: 2024-04-22
Payer: MEDICARE

## 2024-04-22 ENCOUNTER — APPOINTMENT (OUTPATIENT)
Dept: CARDIOLOGY | Facility: HOSPITAL | Age: 74
DRG: 286 | End: 2024-04-22
Payer: MEDICARE

## 2024-04-22 LAB
ANION GAP SERPL CALC-SCNC: 11 MMOL/L (ref 10–20)
AORTIC VALVE MEAN GRADIENT: 9 MMHG
AORTIC VALVE PEAK VELOCITY: 1.85 M/S
AV PEAK GRADIENT: 13.7 MMHG
AVA (PEAK VEL): 2.53 CM2
AVA (VTI): 2.62 CM2
BASOPHILS # BLD AUTO: 0.01 X10*3/UL (ref 0–0.1)
BASOPHILS NFR BLD AUTO: 0.2 %
BUN SERPL-MCNC: 34 MG/DL (ref 6–23)
CALCIUM SERPL-MCNC: 8.4 MG/DL (ref 8.6–10.3)
CHLORIDE SERPL-SCNC: 98 MMOL/L (ref 98–107)
CO2 SERPL-SCNC: 29 MMOL/L (ref 21–32)
CREAT SERPL-MCNC: 4.35 MG/DL (ref 0.5–1.3)
EGFRCR SERPLBLD CKD-EPI 2021: 14 ML/MIN/1.73M*2
EJECTION FRACTION APICAL 4 CHAMBER: 66.8
EOSINOPHIL # BLD AUTO: 0.01 X10*3/UL (ref 0–0.4)
EOSINOPHIL NFR BLD AUTO: 0.2 %
ERYTHROCYTE [DISTWIDTH] IN BLOOD BY AUTOMATED COUNT: 14.6 % (ref 11.5–14.5)
GLUCOSE SERPL-MCNC: 191 MG/DL (ref 74–99)
HCT VFR BLD AUTO: 25.4 % (ref 41–52)
HGB BLD-MCNC: 7.6 G/DL (ref 13.5–17.5)
IMM GRANULOCYTES # BLD AUTO: 0.03 X10*3/UL (ref 0–0.5)
IMM GRANULOCYTES NFR BLD AUTO: 0.5 % (ref 0–0.9)
LEFT ATRIUM VOLUME AREA LENGTH INDEX BSA: 28.7 ML/M2
LEFT VENTRICLE INTERNAL DIMENSION DIASTOLE: 5.1 CM (ref 3.5–6)
LEFT VENTRICULAR OUTFLOW TRACT DIAMETER: 2.2 CM
LV EJECTION FRACTION BIPLANE: 65 %
LYMPHOCYTES # BLD AUTO: 0.23 X10*3/UL (ref 0.8–3)
LYMPHOCYTES NFR BLD AUTO: 3.5 %
MAGNESIUM SERPL-MCNC: 1.54 MG/DL (ref 1.6–2.4)
MCH RBC QN AUTO: 29.8 PG (ref 26–34)
MCHC RBC AUTO-ENTMCNC: 29.9 G/DL (ref 32–36)
MCV RBC AUTO: 100 FL (ref 80–100)
MITRAL VALVE E/A RATIO: 1.56
MITRAL VALVE E/E' RATIO: 13.37
MONOCYTES # BLD AUTO: 0.11 X10*3/UL (ref 0.05–0.8)
MONOCYTES NFR BLD AUTO: 1.7 %
NEUTROPHILS # BLD AUTO: 6.16 X10*3/UL (ref 1.6–5.5)
NEUTROPHILS NFR BLD AUTO: 93.9 %
NRBC BLD-RTO: 0 /100 WBCS (ref 0–0)
PLATELET # BLD AUTO: 153 X10*3/UL (ref 150–450)
POTASSIUM SERPL-SCNC: 4.2 MMOL/L (ref 3.5–5.3)
RBC # BLD AUTO: 2.55 X10*6/UL (ref 4.5–5.9)
RIGHT VENTRICLE FREE WALL PEAK S': 15.7 CM/S
RIGHT VENTRICLE PEAK SYSTOLIC PRESSURE: 41.2 MMHG
SODIUM SERPL-SCNC: 134 MMOL/L (ref 136–145)
TRICUSPID ANNULAR PLANE SYSTOLIC EXCURSION: 2.1 CM
WBC # BLD AUTO: 6.6 X10*3/UL (ref 4.4–11.3)

## 2024-04-22 PROCEDURE — 36415 COLL VENOUS BLD VENIPUNCTURE: CPT | Performed by: NURSE PRACTITIONER

## 2024-04-22 PROCEDURE — 93306 TTE W/DOPPLER COMPLETE: CPT

## 2024-04-22 PROCEDURE — 2500000005 HC RX 250 GENERAL PHARMACY W/O HCPCS: Performed by: NURSE PRACTITIONER

## 2024-04-22 PROCEDURE — 2060000001 HC INTERMEDIATE ICU ROOM DAILY

## 2024-04-22 PROCEDURE — 83735 ASSAY OF MAGNESIUM: CPT | Performed by: NURSE PRACTITIONER

## 2024-04-22 PROCEDURE — 94660 CPAP INITIATION&MGMT: CPT

## 2024-04-22 PROCEDURE — 80048 BASIC METABOLIC PNL TOTAL CA: CPT | Performed by: NURSE PRACTITIONER

## 2024-04-22 PROCEDURE — 2500000004 HC RX 250 GENERAL PHARMACY W/ HCPCS (ALT 636 FOR OP/ED): Performed by: NURSE PRACTITIONER

## 2024-04-22 PROCEDURE — 94640 AIRWAY INHALATION TREATMENT: CPT | Mod: MUE

## 2024-04-22 PROCEDURE — 93306 TTE W/DOPPLER COMPLETE: CPT | Performed by: INTERNAL MEDICINE

## 2024-04-22 PROCEDURE — 85025 COMPLETE CBC W/AUTO DIFF WBC: CPT | Performed by: NURSE PRACTITIONER

## 2024-04-22 PROCEDURE — 8010000001 HC DIALYSIS - HEMODIALYSIS PER DAY

## 2024-04-22 PROCEDURE — 2500000001 HC RX 250 WO HCPCS SELF ADMINISTERED DRUGS (ALT 637 FOR MEDICARE OP): Performed by: NURSE PRACTITIONER

## 2024-04-22 PROCEDURE — 99233 SBSQ HOSP IP/OBS HIGH 50: CPT | Performed by: INTERNAL MEDICINE

## 2024-04-22 PROCEDURE — 2500000002 HC RX 250 W HCPCS SELF ADMINISTERED DRUGS (ALT 637 FOR MEDICARE OP, ALT 636 FOR OP/ED): Mod: MUE | Performed by: NURSE PRACTITIONER

## 2024-04-22 PROCEDURE — 99223 1ST HOSP IP/OBS HIGH 75: CPT | Performed by: INTERNAL MEDICINE

## 2024-04-22 RX ORDER — HEPARIN SODIUM 1000 [USP'U]/ML
1000 INJECTION, SOLUTION INTRAVENOUS; SUBCUTANEOUS
Status: DISCONTINUED | OUTPATIENT
Start: 2024-04-23 | End: 2024-04-27 | Stop reason: HOSPADM

## 2024-04-22 RX ORDER — LORAZEPAM 2 MG/ML
1 INJECTION INTRAMUSCULAR ONCE
Status: DISCONTINUED | OUTPATIENT
Start: 2024-04-22 | End: 2024-04-27 | Stop reason: HOSPADM

## 2024-04-22 RX ADMIN — METHIMAZOLE 5 MG: 5 TABLET ORAL at 09:01

## 2024-04-22 RX ADMIN — METHYLPREDNISOLONE SODIUM SUCCINATE 40 MG: 40 INJECTION, POWDER, FOR SOLUTION INTRAMUSCULAR; INTRAVENOUS at 02:16

## 2024-04-22 RX ADMIN — APIXABAN 5 MG: 5 TABLET, FILM COATED ORAL at 09:02

## 2024-04-22 RX ADMIN — IPRATROPIUM BROMIDE AND ALBUTEROL SULFATE 3 ML: 2.5; .5 SOLUTION RESPIRATORY (INHALATION) at 07:25

## 2024-04-22 RX ADMIN — ATORVASTATIN CALCIUM 40 MG: 40 TABLET, FILM COATED ORAL at 20:25

## 2024-04-22 RX ADMIN — Medication 5 L/MIN: at 07:27

## 2024-04-22 RX ADMIN — DOXYCYCLINE 100 MG: 100 INJECTION, POWDER, LYOPHILIZED, FOR SOLUTION INTRAVENOUS at 18:05

## 2024-04-22 RX ADMIN — APIXABAN 5 MG: 5 TABLET, FILM COATED ORAL at 20:25

## 2024-04-22 RX ADMIN — FLUTICASONE FUROATE AND VILANTEROL TRIFENATATE 1 PUFF: 100; 25 POWDER RESPIRATORY (INHALATION) at 09:12

## 2024-04-22 RX ADMIN — HEPARIN SODIUM 1600 UNITS: 1000 INJECTION INTRAVENOUS; SUBCUTANEOUS at 17:22

## 2024-04-22 RX ADMIN — IPRATROPIUM BROMIDE AND ALBUTEROL SULFATE 3 ML: 2.5; .5 SOLUTION RESPIRATORY (INHALATION) at 19:45

## 2024-04-22 RX ADMIN — MONTELUKAST 10 MG: 10 TABLET, FILM COATED ORAL at 20:25

## 2024-04-22 RX ADMIN — DOXYCYCLINE 100 MG: 100 INJECTION, POWDER, LYOPHILIZED, FOR SOLUTION INTRAVENOUS at 02:16

## 2024-04-22 RX ADMIN — CEFEPIME 1 G: 1 INJECTION, SOLUTION INTRAVENOUS at 12:14

## 2024-04-22 RX ADMIN — CARVEDILOL 6.25 MG: 6.25 TABLET, FILM COATED ORAL at 09:01

## 2024-04-22 RX ADMIN — METHYLPREDNISOLONE SODIUM SUCCINATE 40 MG: 40 INJECTION, POWDER, FOR SOLUTION INTRAMUSCULAR; INTRAVENOUS at 18:05

## 2024-04-22 RX ADMIN — CARVEDILOL 6.25 MG: 6.25 TABLET, FILM COATED ORAL at 18:04

## 2024-04-22 RX ADMIN — METHYLPREDNISOLONE SODIUM SUCCINATE 40 MG: 40 INJECTION, POWDER, FOR SOLUTION INTRAMUSCULAR; INTRAVENOUS at 09:04

## 2024-04-22 ASSESSMENT — PAIN - FUNCTIONAL ASSESSMENT
PAIN_FUNCTIONAL_ASSESSMENT: 0-10
PAIN_FUNCTIONAL_ASSESSMENT: 0-10

## 2024-04-22 ASSESSMENT — PAIN SCALES - GENERAL
PAINLEVEL_OUTOF10: 0 - NO PAIN
PAINLEVEL_OUTOF10: 0 - NO PAIN

## 2024-04-22 NOTE — PROGRESS NOTES
Renard Ward is a 73 y.o. male on day 1 of admission presenting with COPD exacerbation (Multi).      Subjective   Renard Ward is a 73 y.o. male with PMHx s/f COPD end-stage renal disease hypertension atrial flutter with ablations 2 prior occasions presenting with shortness of breath and chills.  Patient had recently been hospitalized last month and treated for pneumonia COPD exacerbation he progressively had decreased activity tolerance more more short of breath he completed outpatient course of outpatient antibiotics and steroids but continued to get worse.  He also noted that he had decreased urinary output he has continued his home dialysis routine.  He does have chills but denies fevers he is not having much cough or sputum production his respiratory excursion feels tight to him he has had some nausea no abdominal pain no urinary symptoms no diarrhea no constipation no new rashes.  In the emergency department he was afebrile 97.4 heart rate 83 regular respiratory rate 20 blood pressure 129/61 SpO2 87% on 2 L patient had been on is much as 5 L at home.  Blood gas was taken found the patient to be acidotic with elevated pCO2 of 96 pO2 34 subsequently started on BiPAP therapy with improvement in pCO2 and stop and oxygen levels patient now saturating in the low 90s consistently.  Influenza and coronavirus swabs were negative CBC shows mild leukocytosis 11.8 hemoglobin 9.1 hematocrit 30 platelets 196 BN peptide is elevated at 1040 troponin 38 trending down to 33 creatinine 5.87.  Patient had CT of the head no acute changes chest x-ray shows opacification of the right base patchy density in the right lower to mid lung increased from prior exam 5 days prior increased opacification of the left base.  The patient will be admitted for severe shortness of breath hypoxia hypercapnia as he requires BiPAP therapy will obtain nephrology consultation to manage his volume continue treating his respiratory conditions  with IV steroids nebulizer treatments IV antibiotics.      04/22: Patient is feeling better, shortness of breath is improving, weaned off of BiPAP and currently maintaining saturation on 5 L.       Objective     Last Recorded Vitals  BP (!) 120/49 (BP Location: Right arm, Patient Position: Lying)   Pulse 84   Temp 36.4 °C (97.5 °F) (Temporal)   Resp 18   Wt 82.2 kg (181 lb 3.5 oz)   SpO2 90%   Intake/Output last 3 Shifts:    Intake/Output Summary (Last 24 hours) at 4/22/2024 1127  Last data filed at 4/22/2024 0306  Gross per 24 hour   Intake 1900 ml   Output 4400 ml   Net -2500 ml       Admission Weight  Weight: 83 kg (182 lb 15.7 oz) (04/21/24 1006)    Daily Weight  04/22/24 : 82.2 kg (181 lb 3.5 oz)    Image Results  ECG 12 lead  Sinus rhythm  Borderline right axis deviation  Baseline wander in lead(s) V3      Physical Exam  Patient is awake and orient, not in apparent distress  Eyes: PERRLA, no conjunctival congestion  Chest: Bilateral decreased air entry, bibasilar crackles, expiratory wheezing.  Heart: s1S2 regular, no murmur  Abdomen: Soft, non tender, BS present  Ext:    Relevant Results               Assessment/Plan      1.  Acute on chronic hypoxic/hypercarbic respiratory failure  Continue oxygen supplementation and wean as tolerated, patient was started on BiPAP and gradually being weaned off and currently maintaining saturation at 5-6.  Baseline oxygen requirement is 5 L    2.  COPD exacerbation  Continue on IV steroid, DuoNeb treatment and antibiotics  Monitor    3.  Pneumonia, presumably gram-negative  Will cover with IV cefepime as patient is allergic to penicillin, vancomycin and Doxy  Monitor  De-escalate antibiotics according to culture and sensitivity report    4.  Acute on chronic diastolic congestive heart failure  Cardiology on board  Dialysis for fluid management    5.  ESRD  Nephrology on board for dialysis    6.  Acute pulmonary edema secondary to fluid overload  On dialysis for fluid  management    7.  Paroxysmal A-fib/flutter  Currently patient on normal sinus rhythm  On anticoagulation              Marya Levin MD

## 2024-04-22 NOTE — PROGRESS NOTES
Occupational Therapy                 Therapy Communication Note    Patient Name: Renard Ward  MRN: [id number]  Today's Date: 4/22/2024     Discipline: Occupational Therapy    Missed Visit Reason: Missed Visit Reason: Patient in a medical procedure (OT charles attempted. pt currently in dialysis.)    Missed Time: Attempt    Comment:

## 2024-04-22 NOTE — PROGRESS NOTES
Physical Therapy                 Therapy Communication Note    Patient Name: Renard Ward  MRN: [id number]  Today's Date: 4/22/2024     Discipline: Physical Therapy    Missed Visit Reason: Patient in a medical procedure (off floor for dialysis)    Missed Time: Attempt

## 2024-04-22 NOTE — CARE PLAN
I was unable to see patient today as was gone an echocardiogram I did discuss plan of care with patient's wife and cardiology attending.  Second contiguous session of dialysis was ordered for volume management in setting of congestive heart failure exacerbation

## 2024-04-22 NOTE — PROCEDURES
Report to Receiving RN:    Report To: Rosa Maria Akbar RN  Time Report Called: [id number]  Hand-Off Communication:   Pt tolerated tx well. Removed 2L Post vitals: 151/71 (85) - 81.8 kg - 97.8*F   Complications During Treatment: No  Ultrafiltration Treatment: No  Medications Administered During Dialysis: No  Blood Products Administered During Dialysis: No  Labs Sent During Dialysis: No  Heparin Drip Rate Changes: NA  Dialysis Catheter Dressing:   Dressing changed, dated 04/22/2024 and initialed TE  Last Dressing Change: 04/22/2024    Electronic Signatures:  Maria Luisa Anton OCDT    Last Updated: 6:33 PM by MARIA LUISA ANTON

## 2024-04-22 NOTE — NURSING NOTE
At approximately at 2345 patient became agitated and he would not wear bipap and took off his oxygen.   Note to Dr. Jay  this Patient is starting it confused. Can I have something to relax him. He is getting out of bed. Stating that someone is playing a trick on him, stating he does not know why he is here. continues to take oxygen off will not wear Bipap. finished with dialysis at 2200. 2L off.     After about 30 minutes staff was able to calm patient. He got back into bed and he stayed there.

## 2024-04-22 NOTE — POST-PROCEDURE NOTE
Report to Receiving RN:    Report To: JERSEY CASTANEDA  Time Report Called: [id number]  Hand-Off Communication: LAST BP , PT TOLERATED TX WELL,VSS,REMOVED 2 LITERS  Complications During Treatment: No  Ultrafiltration Treatment: Yes  Medications Administered During Dialysis: No  Blood Products Administered During Dialysis: No  Labs Sent During Dialysis: No  Heparin Drip Rate Changes: No  Dialysis Catheter Dressing: CLEAN AND DRY  Last Dressing Change: 4/20/24    Electronic Signatures:  KANDACE OCDT  Last Updated: 9:55 PM by ADIS CAMPOS

## 2024-04-22 NOTE — PROGRESS NOTES
04/22/24 1128   Discharge Planning   Living Arrangements Spouse/significant other   Support Systems Spouse/significant other   Assistance Needed independent at baseline   Type of Residence Private residence   Home or Post Acute Services None   Patient expects to be discharged to: Home   Does the patient need discharge transport arranged? No     Met with patient at bedside, introduced self and role as RN TCC. Patient lives at home with spouse. He is generally able to care for self at home, his wife assists with his home hemodialysis with  [facility]. Patient has a PCP , has acces to his medications, spouse prepares meals, transportation. Patient admitted for pneumonia, COPD, respiratory failure, he is on Home O2 around 2-4 L , he uses [facility]. He is currently listed on 5L. Will need new orders sent if he does not return to baseline O2. Patient does express weakness, PT OT on board to evaluate, discussed SNF vs HHC. Will follow up post evals. Patient will be inpatient a few more days per Dr Levin. TCC to follow for discharge planning.     OT evaluated patient, likely Home Care recommendation for DC planning. Will follow up with patient for preference.

## 2024-04-22 NOTE — PROCEDURES
Report from Sending RN:    Report From: Rosa Maria Akbar RN  Recent Surgery of Procedure: No  Baseline Level of Consciousness (LOC): x4  Oxygen Use: Yes, 5L  Type: NC  Diabetic: No  Last BP Med Given Day of Dialysis:   See EMAR  Last Pain Med Given: See EMAR  Lab Tests to be Obtained with Dialysis: No  Blood Transfusion to be Given During Dialysis: No  Available IV Access: Yes  Medications to be Administered During Dialysis: No  Continuous IV Infusion Running: No  Restraints on Currently or in the Last 24 Hours: No  Hand-Off Communication: Eating lunch and knows he will be coming up to dialysis room  Dialysis Catheter Dressing: Will check prior to HD  Last Dressing Change: Will check prior to HD

## 2024-04-22 NOTE — H&P (VIEW-ONLY)
Inpatient consult to Cardiology  Consult performed by: Nathan Feng MD  Consult ordered by: Janusz Bonilla, APRN-CNP  Reason for consult: CHF exacerbation        History Of Present Illness:    Renard Ward is a 73 y.o. male presenting with worsening shortness of breath and weakness for a week.  He also has been getting out of rhythm often he feels the heart fluttering quite often the past week.  He states that despite doing the normal dialysis and fluid removal he is gaining more and more weight and getting edematous up in the arms and abdomen.  He is otherwise adherent to the dietary regimen and medications.  We stopped his lisinopril at the last office visit because his blood pressure was running low currently he states his blood pressure is somewhere in the 140s over 80s on average.  On arrival to the ER he was in hypoxic hypercarbic respiratory failure and was started on BiPAP currently on oxygen and slowly improving.    He has a past medical history of paroxysmal atrial flutter, atheromatous aortic disease, history of TIA and hypertension, tobacco abuse.     He was admitted in summer 2020 with acute kidney injury, moderate to large pericardial effusion and rapid atrial flutter. He underwent pericardiocentesis, no malignancy was detected. He was started on renal replacement therapy he underwent ALLAN guided cardioversion and was started on amiodarone but had recurrence of atrial flutter few days later.  He also had pleural effusion. This was treated medically and eventually he had atrial flutter ablation.  He was initially on peritoneal dialysis but because of issues with the PD catheter despite revision he had to go back on home dialysis.    Recently in July 2023 he was hospitalized with what appears to be pneumonia perhaps aspiration pneumonia. Also had vocal cord palsy and swallowing problems was transferred to another facility.  He tells me he has had multiple pneumonias at least 3 in the past  year he quit smoking in 2019.     His EKG today shows sinus rhythm, personally reviewed.   Echo done on December 02, 2015 with ejection fraction of 50%, concentric LVH and mild pulmonary hypertension.  Echo July 2020- LVEF 45-50%. No residual pericardial effusion.      Event recorder from July 10, 2018 with 6 beat run of SVT at 139 bpm otherwise normal sinus rhythm.        Sleep study from July 30, 2014-absence of clinical evidence of sleep disordered breathing.     A carotid duplex 12/2/15- showed significant stenosis of left external carotid artery but no significant stenosis of internal carotid artery(<50%).     Exercise stress MPI done on June 30, 2014-normal.      .     Last Recorded Vitals:  Vitals:    04/22/24 0042 04/22/24 0503 04/22/24 0727 04/22/24 0819   BP: 162/66 105/64  (!) 120/49   BP Location:    Right arm   Patient Position: Sitting Lying  Lying   Pulse: 98 74  84   Resp: 17 17  18   Temp: 36.1 °C (97 °F) 36.1 °C (96.9 °F)  36.4 °C (97.5 °F)   TempSrc: Temporal Temporal  Temporal   SpO2: 91% 94% 93% 90%   Weight:  82.2 kg (181 lb 3.5 oz)     Height:           Last Labs:  CBC - 4/22/2024:  3:43 AM  6.6 7.6 153    25.4      CMP - 4/22/2024:  3:43 AM  8.4 6.3 12 --- 0.4   CANCELED 4.0 14 59      PTT - No results in last year.  1.5   16.9 _     Troponin I, High Sensitivity   Date/Time Value Ref Range Status   04/21/2024 11:11 AM 33 (H) 0 - 20 ng/L Final   04/21/2024 10:23 AM 38 (H) 0 - 20 ng/L Final   03/05/2024 11:03 AM 20 0 - 20 ng/L Final     BNP   Date/Time Value Ref Range Status   04/21/2024 10:23 AM 1,040 (H) 0 - 99 pg/mL Final   03/05/2024 10:20  (H) 0 - 99 pg/mL Final     Hemoglobin A1C   Date/Time Value Ref Range Status   12/10/2019 08:46 AM 6.1 % Final     Comment:          Diagnosis of Diabetes-Adults   Non-Diabetic: < or = 5.6%   Increased risk for developing diabetes: 5.7-6.4%   Diagnostic of diabetes: > or = 6.5%  .       Monitoring of Diabetes                Age (y)     Therapeutic  "Goal (%)   Adults:          >18           <7.0   Pediatrics:    13-18           <7.5                   7-12           <8.0                   0- 6            7.5-8.5   American Diabetes Association. Diabetes Care 33(S1), Jan 2010.       VLDL   Date/Time Value Ref Range Status   04/14/2023 08:08 AM 32 0 - 40 mg/dL Final   04/11/2022 08:48 AM 39 0 - 40 mg/dL Final   05/25/2021 11:09 AM 23 0 - 40 mg/dL Final      Last I/O:  I/O last 3 completed shifts:  In: 1900 (23.1 mL/kg) [P.O.:300; I.V.:1000 (12.2 mL/kg); Other:400; IV Piggyback:200]  Out: 4400 (53.5 mL/kg) [Other:4400]  Weight: 82.2 kg     Past Cardiology Tests (Last 3 Years):  EKG:  ECG 12 lead 04/21/2024 (Preliminary)      ECG 12 Lead 03/05/2024      ECG 12 lead (Clinic Performed) 12/06/2023      ECG 12 Lead     Echo:  No results found for this or any previous visit from the past 1095 days.    Ejection Fractions:  No results found for: \"EF\"  Cath:  No results found for this or any previous visit from the past 1095 days.    Stress Test:  No results found for this or any previous visit from the past 1095 days.    Cardiac Imaging:  No results found for this or any previous visit from the past 1095 days.      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[facility]) (06/16/2020), 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), Nicotine dependence, unspecified, uncomplicated (01/14/2020), Nontoxic " multinodular goiter (11/19/2020), Obstructive sleep apnea (adult) (pediatric) (03/11/2020), Other allergic rhinitis (02/23/2021), Other fecal abnormalities (07/30/2021), Other hypersomnia (01/23/2020), Other hypersomnia (01/23/2020), Other ill-defined heart diseases (04/27/2020), Other pericardial effusion (noninflammatory) ([facility]) (06/18/2020), Other specified personal risk factors, not elsewhere classified (07/30/2020), Other specified symptoms and signs involving the circulatory and respiratory systems (10/10/2019), Personal history of diseases of the skin and subcutaneous tissue (11/19/2020), Personal history of nicotine dependence (08/18/2021), Personal history of other diseases of the digestive system, Personal history of other diseases of the nervous system and sense organs, Personal history of other endocrine, nutritional and metabolic disease (11/19/2020), Personal history of other endocrine, nutritional and metabolic disease (10/14/2020), Personal history of other endocrine, nutritional and metabolic disease (08/20/2020), Personal history of other infectious and parasitic diseases (07/06/2021), Personal history of other infectious and parasitic diseases (02/04/2021), Personal history of other specified conditions (04/12/2016), Personal history of other specified conditions (04/07/2016), Personal history of transient ischemic attack (TIA), and cerebral infarction without residual deficits, Pleural effusion, not elsewhere classified (07/30/2020), Pleural effusion, not elsewhere classified (03/11/2020), Pneumonia due to Streptococcus pneumoniae (CMS-HCC) (03/11/2020), Post-traumatic stress disorder, unspecified, Rash and other nonspecific skin eruption (03/25/2021), Secondary polycythemia (01/23/2020), and Tinea unguium (08/25/2020).    Past Surgical History:  He has a past surgical history that includes Appendectomy (04/07/2016) and IR CVC tunneled (6/28/2023).      Social History:  He reports that he has  quit smoking. His smoking use included cigarettes. He has never used smokeless tobacco. He reports that he does not currently use alcohol. He reports that he does not use drugs.    Family History:  Family History   Family history unknown: Yes        Allergies:  Penicillins    Inpatient Medications:  Scheduled medications   Medication Dose Route Frequency    apixaban  5 mg oral BID    atorvastatin  40 mg oral Nightly    carvedilol  6.25 mg oral BID with meals    cefepime  1 g intravenous q24h    doxycycline  100 mg intravenous q12h    tiotropium  2 puff inhalation Daily    And    fluticasone furoate-vilanteroL  1 puff inhalation Daily    ipratropium-albuteroL  3 mL nebulization TID    LORazepam  1 mg intravenous Once    methIMAzole  5 mg oral Daily    methylPREDNISolone sodium succinate (PF)  40 mg intravenous q6h    montelukast  10 mg oral Nightly    sodium chloride  200 mL intravenous After Dialysis     PRN medications   Medication    oxygen     Continuous Medications   Medication Dose Last Rate     Outpatient Medications:  Current Outpatient Medications   Medication Instructions    atorvastatin (LIPITOR) 40 mg, oral, Nightly    azithromycin (Zithromax) 250 mg tablet Take 2 tabs (500 mg) by mouth today, than 1 daily for 4 days.    carvedilol (Coreg) 6.25 mg tablet 1 tablet, oral, 2 times daily with meals    cyanocobalamin (vitamin B-12) (VITAMIN B-12) 1,000 mcg, oral, Daily    Eliquis 5 mg, oral, 2 times daily    ergocalciferol (VITAMIN D-2) 1.25 mg, oral, Once Weekly    famotidine (PEPCID) 40 mg, oral, 2 times daily    fluticasone-umeclidin-vilanter (Trelegy Ellipta) 100-62.5-25 mcg blister with device 1 puff, inhalation, Daily RT    methIMAzole (TAPAZOLE) 5 mg, oral, Daily    montelukast (SINGULAIR) 10 mg, oral, Nightly    predniSONE (Deltasone) 20 mg tablet Take 3 tabs (60mg) daily for 3 days, then take 2 tabs (40mg) daily for 3 days, then take 1 tab (20mg) daily for 3 days.    Mary Grace-Darryl Rx 1- mg-mg-mcg  tablet 1 tablet, oral, Daily    torsemide (DEMADEX) 20 mg, oral, 2 times daily       Physical Exam:  Physical Exam  Vitals reviewed.   Constitutional:       Appearance: Normal appearance.   Neck:      Vascular: No carotid bruit or JVD.   Cardiovascular:      Rate and Rhythm: Normal rate and regular rhythm.      Heart sounds: Normal heart sounds, S1 normal and S2 normal. No murmur heard.  Pulmonary:      Effort: Pulmonary effort is normal.      Breath sounds: Normal breath sounds.   Abdominal:      General: Abdomen is flat. Bowel sounds are normal.      Palpations: Abdomen is soft.   Musculoskeletal:      Right lower leg: No edema.      Left lower leg: No edema.   Skin:     General: Skin is warm.   Neurological:      Mental Status: He is alert. Mental status is at baseline.   Psychiatric:         Mood and Affect: Mood normal.         Behavior: Behavior normal.           Assessment/Plan   1-acute hypoxic and hypercarbic respiratory failure-appears mostly to be heart failure although patient does have history of aspiration pneumonia and possibility needs to be ruled out.  We will check an echocardiogram and if LV function is preserved arrange for ischemic evaluation and continue to monitor on telemetry for A-fib burden to rule out any reversible cardiovascular cause of heart failure.  He will likely need to increase dialysis volume outpatient and I would leave it to the expert hands of his nephrologist.    Discussed with Dr. Levin.    2-history of atrial flutter-seems to be in sinus rhythm but will monitor on telemetry as he is stating he is getting palpitations often.  Depending on burden of atrial arrhythmia if any may need another ablation or antiarrhythmic.  Unfortunately in the past though amiodarone did not work very well.  Continue anticoagulation.      Peripheral IV 04/21/24 20 G 4.78 cm Right Forearm (Active)   Site Assessment Clean;Dry;Intact 04/22/24 0900   Dressing Status Dry;Clean 04/22/24 0900   Number  of days: 1       Code Status:  Full Code          Nathan Feng MD

## 2024-04-22 NOTE — CONSULTS
Inpatient consult to Cardiology  Consult performed by: Nathan Feng MD  Consult ordered by: Janusz Bonilla, APRN-CNP  Reason for consult: CHF exacerbation        History Of Present Illness:    Renard Ward is a 73 y.o. male presenting with worsening shortness of breath and weakness for a week.  He also has been getting out of rhythm often he feels the heart fluttering quite often the past week.  He states that despite doing the normal dialysis and fluid removal he is gaining more and more weight and getting edematous up in the arms and abdomen.  He is otherwise adherent to the dietary regimen and medications.  We stopped his lisinopril at the last office visit because his blood pressure was running low currently he states his blood pressure is somewhere in the 140s over 80s on average.  On arrival to the ER he was in hypoxic hypercarbic respiratory failure and was started on BiPAP currently on oxygen and slowly improving.    He has a past medical history of paroxysmal atrial flutter, atheromatous aortic disease, history of TIA and hypertension, tobacco abuse.     He was admitted in summer 2020 with acute kidney injury, moderate to large pericardial effusion and rapid atrial flutter. He underwent pericardiocentesis, no malignancy was detected. He was started on renal replacement therapy he underwent ALLAN guided cardioversion and was started on amiodarone but had recurrence of atrial flutter few days later.  He also had pleural effusion. This was treated medically and eventually he had atrial flutter ablation.  He was initially on peritoneal dialysis but because of issues with the PD catheter despite revision he had to go back on home dialysis.    Recently in July 2023 he was hospitalized with what appears to be pneumonia perhaps aspiration pneumonia. Also had vocal cord palsy and swallowing problems was transferred to another facility.  He tells me he has had multiple pneumonias at least 3 in the past  year he quit smoking in 2019.     His EKG today shows sinus rhythm, personally reviewed.   Echo done on December 02, 2015 with ejection fraction of 50%, concentric LVH and mild pulmonary hypertension.  Echo July 2020- LVEF 45-50%. No residual pericardial effusion.      Event recorder from July 10, 2018 with 6 beat run of SVT at 139 bpm otherwise normal sinus rhythm.        Sleep study from July 30, 2014-absence of clinical evidence of sleep disordered breathing.     A carotid duplex 12/2/15- showed significant stenosis of left external carotid artery but no significant stenosis of internal carotid artery(<50%).     Exercise stress MPI done on June 30, 2014-normal.      .     Last Recorded Vitals:  Vitals:    04/22/24 0042 04/22/24 0503 04/22/24 0727 04/22/24 0819   BP: 162/66 105/64  (!) 120/49   BP Location:    Right arm   Patient Position: Sitting Lying  Lying   Pulse: 98 74  84   Resp: 17 17  18   Temp: 36.1 °C (97 °F) 36.1 °C (96.9 °F)  36.4 °C (97.5 °F)   TempSrc: Temporal Temporal  Temporal   SpO2: 91% 94% 93% 90%   Weight:  82.2 kg (181 lb 3.5 oz)     Height:           Last Labs:  CBC - 4/22/2024:  3:43 AM  6.6 7.6 153    25.4      CMP - 4/22/2024:  3:43 AM  8.4 6.3 12 --- 0.4   CANCELED 4.0 14 59      PTT - No results in last year.  1.5   16.9 _     Troponin I, High Sensitivity   Date/Time Value Ref Range Status   04/21/2024 11:11 AM 33 (H) 0 - 20 ng/L Final   04/21/2024 10:23 AM 38 (H) 0 - 20 ng/L Final   03/05/2024 11:03 AM 20 0 - 20 ng/L Final     BNP   Date/Time Value Ref Range Status   04/21/2024 10:23 AM 1,040 (H) 0 - 99 pg/mL Final   03/05/2024 10:20  (H) 0 - 99 pg/mL Final     Hemoglobin A1C   Date/Time Value Ref Range Status   12/10/2019 08:46 AM 6.1 % Final     Comment:          Diagnosis of Diabetes-Adults   Non-Diabetic: < or = 5.6%   Increased risk for developing diabetes: 5.7-6.4%   Diagnostic of diabetes: > or = 6.5%  .       Monitoring of Diabetes                Age (y)     Therapeutic  "Goal (%)   Adults:          >18           <7.0   Pediatrics:    13-18           <7.5                   7-12           <8.0                   0- 6            7.5-8.5   American Diabetes Association. Diabetes Care 33(S1), Jan 2010.       VLDL   Date/Time Value Ref Range Status   04/14/2023 08:08 AM 32 0 - 40 mg/dL Final   04/11/2022 08:48 AM 39 0 - 40 mg/dL Final   05/25/2021 11:09 AM 23 0 - 40 mg/dL Final      Last I/O:  I/O last 3 completed shifts:  In: 1900 (23.1 mL/kg) [P.O.:300; I.V.:1000 (12.2 mL/kg); Other:400; IV Piggyback:200]  Out: 4400 (53.5 mL/kg) [Other:4400]  Weight: 82.2 kg     Past Cardiology Tests (Last 3 Years):  EKG:  ECG 12 lead 04/21/2024 (Preliminary)      ECG 12 Lead 03/05/2024      ECG 12 lead (Clinic Performed) 12/06/2023      ECG 12 Lead     Echo:  No results found for this or any previous visit from the past 1095 days.    Ejection Fractions:  No results found for: \"EF\"  Cath:  No results found for this or any previous visit from the past 1095 days.    Stress Test:  No results found for this or any previous visit from the past 1095 days.    Cardiac Imaging:  No results found for this or any previous visit from the past 1095 days.      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[facility]) (06/16/2020), 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), Nicotine dependence, unspecified, uncomplicated (01/14/2020), Nontoxic " multinodular goiter (11/19/2020), Obstructive sleep apnea (adult) (pediatric) (03/11/2020), Other allergic rhinitis (02/23/2021), Other fecal abnormalities (07/30/2021), Other hypersomnia (01/23/2020), Other hypersomnia (01/23/2020), Other ill-defined heart diseases (04/27/2020), Other pericardial effusion (noninflammatory) ([facility]) (06/18/2020), Other specified personal risk factors, not elsewhere classified (07/30/2020), Other specified symptoms and signs involving the circulatory and respiratory systems (10/10/2019), Personal history of diseases of the skin and subcutaneous tissue (11/19/2020), Personal history of nicotine dependence (08/18/2021), Personal history of other diseases of the digestive system, Personal history of other diseases of the nervous system and sense organs, Personal history of other endocrine, nutritional and metabolic disease (11/19/2020), Personal history of other endocrine, nutritional and metabolic disease (10/14/2020), Personal history of other endocrine, nutritional and metabolic disease (08/20/2020), Personal history of other infectious and parasitic diseases (07/06/2021), Personal history of other infectious and parasitic diseases (02/04/2021), Personal history of other specified conditions (04/12/2016), Personal history of other specified conditions (04/07/2016), Personal history of transient ischemic attack (TIA), and cerebral infarction without residual deficits, Pleural effusion, not elsewhere classified (07/30/2020), Pleural effusion, not elsewhere classified (03/11/2020), Pneumonia due to Streptococcus pneumoniae (CMS-HCC) (03/11/2020), Post-traumatic stress disorder, unspecified, Rash and other nonspecific skin eruption (03/25/2021), Secondary polycythemia (01/23/2020), and Tinea unguium (08/25/2020).    Past Surgical History:  He has a past surgical history that includes Appendectomy (04/07/2016) and IR CVC tunneled (6/28/2023).      Social History:  He reports that he has  quit smoking. His smoking use included cigarettes. He has never used smokeless tobacco. He reports that he does not currently use alcohol. He reports that he does not use drugs.    Family History:  Family History   Family history unknown: Yes        Allergies:  Penicillins    Inpatient Medications:  Scheduled medications   Medication Dose Route Frequency    apixaban  5 mg oral BID    atorvastatin  40 mg oral Nightly    carvedilol  6.25 mg oral BID with meals    cefepime  1 g intravenous q24h    doxycycline  100 mg intravenous q12h    tiotropium  2 puff inhalation Daily    And    fluticasone furoate-vilanteroL  1 puff inhalation Daily    ipratropium-albuteroL  3 mL nebulization TID    LORazepam  1 mg intravenous Once    methIMAzole  5 mg oral Daily    methylPREDNISolone sodium succinate (PF)  40 mg intravenous q6h    montelukast  10 mg oral Nightly    sodium chloride  200 mL intravenous After Dialysis     PRN medications   Medication    oxygen     Continuous Medications   Medication Dose Last Rate     Outpatient Medications:  Current Outpatient Medications   Medication Instructions    atorvastatin (LIPITOR) 40 mg, oral, Nightly    azithromycin (Zithromax) 250 mg tablet Take 2 tabs (500 mg) by mouth today, than 1 daily for 4 days.    carvedilol (Coreg) 6.25 mg tablet 1 tablet, oral, 2 times daily with meals    cyanocobalamin (vitamin B-12) (VITAMIN B-12) 1,000 mcg, oral, Daily    Eliquis 5 mg, oral, 2 times daily    ergocalciferol (VITAMIN D-2) 1.25 mg, oral, Once Weekly    famotidine (PEPCID) 40 mg, oral, 2 times daily    fluticasone-umeclidin-vilanter (Trelegy Ellipta) 100-62.5-25 mcg blister with device 1 puff, inhalation, Daily RT    methIMAzole (TAPAZOLE) 5 mg, oral, Daily    montelukast (SINGULAIR) 10 mg, oral, Nightly    predniSONE (Deltasone) 20 mg tablet Take 3 tabs (60mg) daily for 3 days, then take 2 tabs (40mg) daily for 3 days, then take 1 tab (20mg) daily for 3 days.    Mary Grace-Darryl Rx 1- mg-mg-mcg  tablet 1 tablet, oral, Daily    torsemide (DEMADEX) 20 mg, oral, 2 times daily       Physical Exam:  Physical Exam  Vitals reviewed.   Constitutional:       Appearance: Normal appearance.   Neck:      Vascular: No carotid bruit or JVD.   Cardiovascular:      Rate and Rhythm: Normal rate and regular rhythm.      Heart sounds: Normal heart sounds, S1 normal and S2 normal. No murmur heard.  Pulmonary:      Effort: Pulmonary effort is normal.      Breath sounds: Normal breath sounds.   Abdominal:      General: Abdomen is flat. Bowel sounds are normal.      Palpations: Abdomen is soft.   Musculoskeletal:      Right lower leg: No edema.      Left lower leg: No edema.   Skin:     General: Skin is warm.   Neurological:      Mental Status: He is alert. Mental status is at baseline.   Psychiatric:         Mood and Affect: Mood normal.         Behavior: Behavior normal.           Assessment/Plan   1-acute hypoxic and hypercarbic respiratory failure-appears mostly to be heart failure although patient does have history of aspiration pneumonia and possibility needs to be ruled out.  We will check an echocardiogram and if LV function is preserved arrange for ischemic evaluation and continue to monitor on telemetry for A-fib burden to rule out any reversible cardiovascular cause of heart failure.  He will likely need to increase dialysis volume outpatient and I would leave it to the expert hands of his nephrologist.    Discussed with Dr. Levin.    2-history of atrial flutter-seems to be in sinus rhythm but will monitor on telemetry as he is stating he is getting palpitations often.  Depending on burden of atrial arrhythmia if any may need another ablation or antiarrhythmic.  Unfortunately in the past though amiodarone did not work very well.  Continue anticoagulation.      Peripheral IV 04/21/24 20 G 4.78 cm Right Forearm (Active)   Site Assessment Clean;Dry;Intact 04/22/24 0900   Dressing Status Dry;Clean 04/22/24 0900   Number  of days: 1       Code Status:  Full Code          Nathan Feng MD

## 2024-04-23 ENCOUNTER — TELEMEDICINE (OUTPATIENT)
Dept: PHARMACY | Facility: HOSPITAL | Age: 74
End: 2024-04-23
Payer: MEDICARE

## 2024-04-23 ENCOUNTER — APPOINTMENT (OUTPATIENT)
Dept: CARDIOLOGY | Facility: HOSPITAL | Age: 74
DRG: 286 | End: 2024-04-23
Payer: MEDICARE

## 2024-04-23 ENCOUNTER — APPOINTMENT (OUTPATIENT)
Dept: DIALYSIS | Facility: HOSPITAL | Age: 74
End: 2024-04-23
Payer: MEDICARE

## 2024-04-23 DIAGNOSIS — J44.9 CHRONIC OBSTRUCTIVE PULMONARY DISEASE, UNSPECIFIED COPD TYPE (MULTI): Primary | ICD-10-CM

## 2024-04-23 PROBLEM — D63.1 ANEMIA OF CHRONIC RENAL FAILURE, STAGE 5 (MULTI): Status: ACTIVE | Noted: 2024-04-23

## 2024-04-23 PROBLEM — N18.5 ANEMIA OF CHRONIC RENAL FAILURE, STAGE 5 (MULTI): Status: ACTIVE | Noted: 2024-04-23

## 2024-04-23 LAB
ANION GAP SERPL CALC-SCNC: 8 MMOL/L (ref 10–20)
BUN SERPL-MCNC: 30 MG/DL (ref 6–23)
CALCIUM SERPL-MCNC: 8.5 MG/DL (ref 8.6–10.3)
CHLORIDE SERPL-SCNC: 102 MMOL/L (ref 98–107)
CO2 SERPL-SCNC: 33 MMOL/L (ref 21–32)
CREAT SERPL-MCNC: 3.53 MG/DL (ref 0.5–1.3)
EGFRCR SERPLBLD CKD-EPI 2021: 17 ML/MIN/1.73M*2
ERYTHROCYTE [DISTWIDTH] IN BLOOD BY AUTOMATED COUNT: 14.9 % (ref 11.5–14.5)
FERRITIN SERPL-MCNC: 901 NG/ML (ref 20–300)
GLUCOSE SERPL-MCNC: 134 MG/DL (ref 74–99)
HBV SURFACE AB SER-ACNC: <3.1 MIU/ML
HCT VFR BLD AUTO: 25.2 % (ref 41–52)
HGB BLD-MCNC: 7.4 G/DL (ref 13.5–17.5)
IRON SATN MFR SERPL: 61 % (ref 25–45)
IRON SERPL-MCNC: 123 UG/DL (ref 35–150)
MAGNESIUM SERPL-MCNC: 1.76 MG/DL (ref 1.6–2.4)
MCH RBC QN AUTO: 29.6 PG (ref 26–34)
MCHC RBC AUTO-ENTMCNC: 29.4 G/DL (ref 32–36)
MCV RBC AUTO: 101 FL (ref 80–100)
NRBC BLD-RTO: 0 /100 WBCS (ref 0–0)
PLATELET # BLD AUTO: 181 X10*3/UL (ref 150–450)
POTASSIUM SERPL-SCNC: 4.3 MMOL/L (ref 3.5–5.3)
RBC # BLD AUTO: 2.5 X10*6/UL (ref 4.5–5.9)
SODIUM SERPL-SCNC: 139 MMOL/L (ref 136–145)
TIBC SERPL-MCNC: 202 UG/DL (ref 240–445)
UIBC SERPL-MCNC: 79 UG/DL (ref 110–370)
WBC # BLD AUTO: 12.3 X10*3/UL (ref 4.4–11.3)

## 2024-04-23 PROCEDURE — 80048 BASIC METABOLIC PNL TOTAL CA: CPT | Performed by: INTERNAL MEDICINE

## 2024-04-23 PROCEDURE — 36415 COLL VENOUS BLD VENIPUNCTURE: CPT | Performed by: INTERNAL MEDICINE

## 2024-04-23 PROCEDURE — 93010 ELECTROCARDIOGRAM REPORT: CPT | Performed by: INTERNAL MEDICINE

## 2024-04-23 PROCEDURE — 99233 SBSQ HOSP IP/OBS HIGH 50: CPT | Performed by: INTERNAL MEDICINE

## 2024-04-23 PROCEDURE — 93005 ELECTROCARDIOGRAM TRACING: CPT

## 2024-04-23 PROCEDURE — 2060000001 HC INTERMEDIATE ICU ROOM DAILY

## 2024-04-23 PROCEDURE — 83550 IRON BINDING TEST: CPT | Mod: PORLAB | Performed by: INTERNAL MEDICINE

## 2024-04-23 PROCEDURE — 2500000001 HC RX 250 WO HCPCS SELF ADMINISTERED DRUGS (ALT 637 FOR MEDICARE OP): Performed by: NURSE PRACTITIONER

## 2024-04-23 PROCEDURE — 83735 ASSAY OF MAGNESIUM: CPT | Performed by: INTERNAL MEDICINE

## 2024-04-23 PROCEDURE — 2500000004 HC RX 250 GENERAL PHARMACY W/ HCPCS (ALT 636 FOR OP/ED): Performed by: INTERNAL MEDICINE

## 2024-04-23 PROCEDURE — 2500000001 HC RX 250 WO HCPCS SELF ADMINISTERED DRUGS (ALT 637 FOR MEDICARE OP): Performed by: INTERNAL MEDICINE

## 2024-04-23 PROCEDURE — 86706 HEP B SURFACE ANTIBODY: CPT | Mod: PORLAB | Performed by: INTERNAL MEDICINE

## 2024-04-23 PROCEDURE — 85027 COMPLETE CBC AUTOMATED: CPT | Performed by: INTERNAL MEDICINE

## 2024-04-23 PROCEDURE — 94660 CPAP INITIATION&MGMT: CPT

## 2024-04-23 PROCEDURE — 97165 OT EVAL LOW COMPLEX 30 MIN: CPT | Mod: GO

## 2024-04-23 PROCEDURE — 8010000001 HC DIALYSIS - HEMODIALYSIS PER DAY

## 2024-04-23 PROCEDURE — 82728 ASSAY OF FERRITIN: CPT | Mod: PORLAB | Performed by: INTERNAL MEDICINE

## 2024-04-23 PROCEDURE — 2500000004 HC RX 250 GENERAL PHARMACY W/ HCPCS (ALT 636 FOR OP/ED): Mod: JZ | Performed by: NURSE PRACTITIONER

## 2024-04-23 RX ORDER — AMINOPHYLLINE 25 MG/ML
125 INJECTION, SOLUTION INTRAVENOUS AS NEEDED
Status: CANCELLED | OUTPATIENT
Start: 2024-04-23

## 2024-04-23 RX ORDER — IPRATROPIUM BROMIDE AND ALBUTEROL SULFATE 2.5; .5 MG/3ML; MG/3ML
3 SOLUTION RESPIRATORY (INHALATION)
Status: DISCONTINUED | OUTPATIENT
Start: 2024-04-23 | End: 2024-04-27 | Stop reason: HOSPADM

## 2024-04-23 RX ORDER — HEPARIN SODIUM 1000 [USP'U]/ML
1500 INJECTION, SOLUTION INTRAVENOUS; SUBCUTANEOUS
Status: CANCELLED | OUTPATIENT
Start: 2024-04-24

## 2024-04-23 RX ADMIN — APIXABAN 5 MG: 5 TABLET, FILM COATED ORAL at 08:37

## 2024-04-23 RX ADMIN — FLUTICASONE FUROATE AND VILANTEROL TRIFENATATE 1 PUFF: 100; 25 POWDER RESPIRATORY (INHALATION) at 08:39

## 2024-04-23 RX ADMIN — HEPARIN SODIUM 1000 UNITS: 1000 INJECTION INTRAVENOUS; SUBCUTANEOUS at 16:52

## 2024-04-23 RX ADMIN — ATORVASTATIN CALCIUM 40 MG: 40 TABLET, FILM COATED ORAL at 21:10

## 2024-04-23 RX ADMIN — MONTELUKAST 10 MG: 10 TABLET, FILM COATED ORAL at 21:11

## 2024-04-23 RX ADMIN — METHYLPREDNISOLONE SODIUM SUCCINATE 40 MG: 40 INJECTION, POWDER, FOR SOLUTION INTRAMUSCULAR; INTRAVENOUS at 03:16

## 2024-04-23 RX ADMIN — CEFEPIME 1 G: 1 INJECTION, SOLUTION INTRAVENOUS at 13:00

## 2024-04-23 RX ADMIN — METHYLPREDNISOLONE SODIUM SUCCINATE 40 MG: 40 INJECTION, POWDER, FOR SOLUTION INTRAMUSCULAR; INTRAVENOUS at 08:37

## 2024-04-23 RX ADMIN — Medication 1 CAPSULE: at 17:24

## 2024-04-23 RX ADMIN — METHYLPREDNISOLONE SODIUM SUCCINATE 40 MG: 40 INJECTION, POWDER, FOR SOLUTION INTRAMUSCULAR; INTRAVENOUS at 13:40

## 2024-04-23 RX ADMIN — APIXABAN 5 MG: 5 TABLET, FILM COATED ORAL at 21:10

## 2024-04-23 RX ADMIN — METHIMAZOLE 5 MG: 5 TABLET ORAL at 08:37

## 2024-04-23 RX ADMIN — TIOTROPIUM BROMIDE INHALATION SPRAY 2 PUFF: 3.12 SPRAY, METERED RESPIRATORY (INHALATION) at 08:39

## 2024-04-23 RX ADMIN — HEPARIN SODIUM 1600 UNITS: 1000 INJECTION INTRAVENOUS; SUBCUTANEOUS at 16:50

## 2024-04-23 RX ADMIN — CARVEDILOL 6.25 MG: 6.25 TABLET, FILM COATED ORAL at 17:24

## 2024-04-23 RX ADMIN — HEPARIN SODIUM 1600 UNITS: 1000 INJECTION INTRAVENOUS; SUBCUTANEOUS at 16:54

## 2024-04-23 RX ADMIN — METHYLPREDNISOLONE SODIUM SUCCINATE 40 MG: 40 INJECTION, POWDER, FOR SOLUTION INTRAMUSCULAR; INTRAVENOUS at 21:10

## 2024-04-23 RX ADMIN — DOXYCYCLINE 100 MG: 100 INJECTION, POWDER, LYOPHILIZED, FOR SOLUTION INTRAVENOUS at 05:44

## 2024-04-23 RX ADMIN — DOXYCYCLINE 100 MG: 100 INJECTION, POWDER, LYOPHILIZED, FOR SOLUTION INTRAVENOUS at 13:40

## 2024-04-23 RX ADMIN — CARVEDILOL 6.25 MG: 6.25 TABLET, FILM COATED ORAL at 08:37

## 2024-04-23 ASSESSMENT — ACTIVITIES OF DAILY LIVING (ADL)
BATHING_ASSISTANCE: MAXIMAL
ADL_ASSISTANCE: INDEPENDENT

## 2024-04-23 ASSESSMENT — COGNITIVE AND FUNCTIONAL STATUS - GENERAL
DRESSING REGULAR LOWER BODY CLOTHING: A LOT
PERSONAL GROOMING: A LITTLE
PERSONAL GROOMING: A LITTLE
DRESSING REGULAR UPPER BODY CLOTHING: A LOT
MOVING TO AND FROM BED TO CHAIR: A LITTLE
TURNING FROM BACK TO SIDE WHILE IN FLAT BAD: A LITTLE
TOILETING: A LOT
EATING MEALS: A LITTLE
DRESSING REGULAR UPPER BODY CLOTHING: A LOT
EATING MEALS: A LITTLE
DRESSING REGULAR LOWER BODY CLOTHING: A LOT
TOILETING: A LOT
HELP NEEDED FOR BATHING: A LOT
STANDING UP FROM CHAIR USING ARMS: A LITTLE
DAILY ACTIVITIY SCORE: 14
DAILY ACTIVITIY SCORE: 14
CLIMB 3 TO 5 STEPS WITH RAILING: TOTAL
HELP NEEDED FOR BATHING: A LOT
WALKING IN HOSPITAL ROOM: A LOT
MOBILITY SCORE: 16

## 2024-04-23 ASSESSMENT — PAIN - FUNCTIONAL ASSESSMENT
PAIN_FUNCTIONAL_ASSESSMENT: NO/DENIES PAIN
PAIN_FUNCTIONAL_ASSESSMENT: 0-10
PAIN_FUNCTIONAL_ASSESSMENT: 0-10

## 2024-04-23 ASSESSMENT — PAIN SCALES - GENERAL
PAINLEVEL_OUTOF10: 0 - NO PAIN

## 2024-04-23 NOTE — CARE PLAN
The patient's goals for the shift include      The clinical goals for the shift include SPO2 >88%      Problem: Pain  Goal: My pain/discomfort is manageable  Outcome: Progressing     Problem: Safety  Goal: Patient will be injury free during hospitalization  Outcome: Progressing  Goal: I will remain free of falls  Outcome: Progressing     Problem: Daily Care  Goal: Daily care needs are met  Outcome: Progressing     Problem: Psychosocial Needs  Goal: Demonstrates ability to cope with hospitalization/illness  Outcome: Progressing  Goal: Collaborate with me, my family, and caregiver to identify my specific goals  Outcome: Progressing     Problem: Discharge Barriers  Goal: My discharge needs are met  Outcome: Progressing

## 2024-04-23 NOTE — CARE PLAN
The patient's goals for the shift include    Problem: Safety  Goal: Patient will be injury free during hospitalization  Outcome: Progressing     Problem: Safety  Goal: I will remain free of falls  Outcome: Progressing     Problem: Daily Care  Goal: Daily care needs are met  Outcome: Progressing       The clinical goals for the shift include Patient will maintain his breathing greater than 90% on 5L by end of the shift    Over the shift, the patient did make progress towards goals.  All needs met, patient safety maintained.

## 2024-04-23 NOTE — POST-PROCEDURE NOTE
Report to Receiving RN:    Report To: NINO KAUR  Time Report Called: [id number]  Hand-Off Communication: ELEVATED BP  Complications During Treatment: No  Ultrafiltration Treatment: No  Medications Administered During Dialysis: No  Blood Products Administered During Dialysis: No  Labs Sent During Dialysis: No  Heparin Drip Rate Changes: N/A  Dialysis Catheter Dressin/22  Last Dressing Change:     Electronic Signatures:  KAMILA OLIVEIRA     Last Updated: 5:10 PM by KAMILA OLIVEIRA

## 2024-04-23 NOTE — NURSING NOTE
Report from Sending RN:    Report From: Vonda  Recent Surgery of Procedure: No  Baseline Level of Consciousness (LOC): A&O X's 3  Oxygen Use: Yes  Type: Nasel Cannula  Diabetic: No  Last BP Med Given Day of Dialysis: see EMAR  Last Pain Med Given: see EMAR  Lab Tests to be Obtained with Dialysis: No  Blood Transfusion to be Given During Dialysis: No  Available IV Access: Yes  Medications to be Administered During Dialysis: No  Continuous IV Infusion Running: No  Restraints on Currently or in the Last 24 Hours: No  Hand-Off Communication: Patient remains on 5 liters of O2.  Respirations are easy but states he is SOB  Dialysis Catheter Dressing: Dry and intact  Last Dressing Change: 4/22/2024

## 2024-04-23 NOTE — PROGRESS NOTES
"Subjective Data:  Mr. Ward is resting in bed, no acute distress. Remains short of breath at rest and with exertion. He denies chest pain or pressure but continues to feel \"fluttering in my chest at times\". Monitor demonstrates SR with rates in the 80s.     Overnight Events:    None     Objective Data:  Last Recorded Vitals:  Vitals:    04/22/24 1945 04/22/24 2311 04/23/24 0310 04/23/24 0811   BP:  113/55 107/51 124/62   BP Location:  Right arm Right arm Right arm   Patient Position:  Lying Lying Lying   Pulse:  80 79 79   Resp:  18 18 18   Temp:  36.7 °C (98 °F) 35.6 °C (96 °F) 36.7 °C (98.1 °F)   TempSrc:  Temporal Temporal Temporal   SpO2: 97% 97% 98% 97%   Weight:   80.8 kg (178 lb 2.1 oz)    Height:           Last Labs:  CBC - 4/23/2024:  4:29 AM  12.3 7.4 181    25.2      CMP - 4/23/2024:  4:29 AM  8.5 6.3 12 --- 0.4   CANCELED 4.0 14 59      PTT - No results in last year.  1.5   16.9 _     TROPHS   Date/Time Value Ref Range Status   04/21/2024 11:11 AM 33 0 - 20 ng/L Final   04/21/2024 10:23 AM 38 0 - 20 ng/L Final   03/05/2024 11:03 AM 20 0 - 20 ng/L Final     BNP   Date/Time Value Ref Range Status   04/21/2024 10:23 AM 1,040 0 - 99 pg/mL Final   03/05/2024 10:20  0 - 99 pg/mL Final     HGBA1C   Date/Time Value Ref Range Status   12/10/2019 08:46 AM 6.1 % Final     Comment:          Diagnosis of Diabetes-Adults   Non-Diabetic: < or = 5.6%   Increased risk for developing diabetes: 5.7-6.4%   Diagnostic of diabetes: > or = 6.5%  .       Monitoring of Diabetes                Age (y)     Therapeutic Goal (%)   Adults:          >18           <7.0   Pediatrics:    13-18           <7.5                   7-12           <8.0                   0- 6            7.5-8.5   American Diabetes Association. Diabetes Care 33(S1), Jan 2010.       VLDL   Date/Time Value Ref Range Status   04/14/2023 08:08 AM 32 0 - 40 mg/dL Final   04/11/2022 08:48 AM 39 0 - 40 mg/dL Final   05/25/2021 11:09 AM 23 0 - 40 mg/dL Final    " "  Last I/O:  I/O last 3 completed shifts:  In: 3382 (41.9 mL/kg) [P.O.:582; I.V.:1800 (22.3 mL/kg); Other:800; IV Piggyback:200]  Out: 6803 (84.2 mL/kg) [Other:6803]  Weight: 80.8 kg     Past Cardiology Tests (Last 3 Years):  EKG:  ECG 12 lead 04/21/2024 (Preliminary)      ECG 12 Lead 03/05/2024      ECG 12 lead (Clinic Performed) 12/06/2023      ECG 12 Lead     Echo:  Transthoracic Echo (TTE) Complete 04/22/2024  Left ventricular systolic function is normal with a 60-65% estimated ejection fraction.   2. There is a moderate pleural effusion.   3. Mild mitral valve regurgitation.   4. Mild tricuspid regurgitation is visualized.   5. Mildly elevated RVSP.  Ejection Fractions:  No results found for: \"EF\"  Cath:  No results found for this or any previous visit from the past 1095 days.    Stress Test:  No results found for this or any previous visit from the past 1095 days.    Cardiac Imaging:  No results found for this or any previous visit from the past 1095 days.      Inpatient Medications:  Scheduled medications   Medication Dose Route Frequency    apixaban  5 mg oral BID    atorvastatin  40 mg oral Nightly    carvedilol  6.25 mg oral BID with meals    cefepime  1 g intravenous q24h    doxycycline  100 mg intravenous q12h    tiotropium  2 puff inhalation Daily    And    fluticasone furoate-vilanteroL  1 puff inhalation Daily    heparin  1,000 Units intra-catheter After Dialysis    heparin  1,000 Units intra-catheter After Dialysis    LORazepam  1 mg intravenous Once    methIMAzole  5 mg oral Daily    methylPREDNISolone sodium succinate (PF)  40 mg intravenous q6h    montelukast  10 mg oral Nightly    perflutren protein A microsphere  0.5 mL intravenous Once in imaging    sodium chloride  200 mL intravenous After Dialysis     PRN medications   Medication    ipratropium-albuteroL    oxygen     Continuous Medications   Medication Dose Last Rate       Physical Exam:  Physical Exam  Vitals reviewed.   Constitutional:     "   Appearance: Normal appearance.   HENT:      Head: Normocephalic.      Mouth/Throat:      Mouth: Mucous membranes are moist.   Cardiovascular:      Rate and Rhythm: Normal rate and regular rhythm.      Pulses: Normal pulses.      Heart sounds: Normal heart sounds.   Pulmonary:      Effort: Pulmonary effort is normal.      Breath sounds: Rales (bibasilar) present. No wheezing or rhonchi.   Abdominal:      General: Bowel sounds are normal. There is no distension.      Palpations: Abdomen is soft.      Tenderness: There is no abdominal tenderness.   Musculoskeletal:      Cervical back: Normal range of motion.      Right lower leg: No edema.      Left lower leg: No edema.   Skin:     General: Skin is warm and dry.   Neurological:      General: No focal deficit present.      Mental Status: He is alert and oriented to person, place, and time.   Psychiatric:         Mood and Affect: Mood normal.         Behavior: Behavior normal.           Assessment/Plan   1-acute hypoxic and hypercarbic respiratory failure-appears mostly to be heart failure although patient does have history of aspiration pneumonia and possibility needs to be ruled out.  We will check an echocardiogram and if LV function is preserved arrange for ischemic evaluation and continue to monitor on telemetry for A-fib burden to rule out any reversible cardiovascular cause of heart failure.  He will likely need to increase dialysis volume outpatient and I would leave it to the expert hands of his nephrologist.     Discussed with Dr. Levin.    4/23/24: Patient remains short of breath this morning despite 2 sessions of HD with 2L removed with each session. Echo with normal LV systolic function with EF 60-65%. Patient agreeable to proceed with stress test, will order.      2-history of atrial flutter-seems to be in sinus rhythm but will monitor on telemetry as he is stating he is getting palpitations often.  Depending on burden of atrial arrhythmia if any may  need another ablation or antiarrhythmic.  Unfortunately in the past though amiodarone did not work very well.  Continue anticoagulation.    4/23/24: Continues to feel palpitations at times, maintaining SR with rates in the 80s today. Continue oral anticoagulation with apixaban, no abnormal bleeding reported. Recommend 14 day Holter monitor at discharge to evaluate burden of Afib.            Code Status:  Full Code      Tracy Wagner, APRN-CNP

## 2024-04-23 NOTE — PROGRESS NOTES
Physical Therapy                 Therapy Communication Note    Patient Name: Renard Ward  MRN: [id number]  Today's Date: 4/23/2024     Discipline: Physical Therapy    Missed Visit Reason: Patient in a medical procedure    Missed Time: Attempt    Comment: OT recommended PT return this afternoon as patient was fatigued after eval this morning. PT returned, educated pt on reason for referral. Pt agreeable to participate. Physician came in to see patient, therapist stepped out. Upon return, patient being transferred to dialysis. To reapproach for PT eval 4/24/24. Patient agreeable.

## 2024-04-23 NOTE — CONSULTS
Reason For Consult   Esrd     History Of Present Illness  Renard Ward is a 73 y.o. male presenting with complaint of dyspnea.  Patient does home hemodialysis has right internal jugular tunneled dialysis catheter and has ellipsis AV fistula.History of COPD.  Now on home hemodialysis.  Does 4 treatments a week  Dry weight is close to 78 kg's but patient has been hovering close to 81 to 82 kg's.  Typically only remove this 700 to 800 mL of volume on a dialysis session.  Audiology has been consulted for the patient.  Underwent echocardiogram that showed preserved ejection fraction.  Initially was on noninvasive positive pressure ventilation and has now been taken off of that.  Past medical history significant for a flutter TIA hypertension tobacco abuse and atheromatous aortic disease.    May need possibly an ablation as well.  Stress test is planned for April 24.  .     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), 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), Nicotine dependence, unspecified, uncomplicated (01/14/2020), Nontoxic multinodular goiter (11/19/2020), Obstructive sleep apnea (adult) (pediatric) (03/11/2020), Other allergic rhinitis (02/23/2021), Other fecal abnormalities (07/30/2021), Other hypersomnia (01/23/2020), Other hypersomnia (01/23/2020), Other ill-defined heart diseases (04/27/2020), Other pericardial  effusion (noninflammatory) ([facility]) (06/18/2020), Other specified personal risk factors, not elsewhere classified (07/30/2020), Other specified symptoms and signs involving the circulatory and respiratory systems (10/10/2019), Personal history of diseases of the skin and subcutaneous tissue (11/19/2020), Personal history of nicotine dependence (08/18/2021), Personal history of other diseases of the digestive system, Personal history of other diseases of the nervous system and sense organs, Personal history of other endocrine, nutritional and metabolic disease (11/19/2020), Personal history of other endocrine, nutritional and metabolic disease (10/14/2020), Personal history of other endocrine, nutritional and metabolic disease (08/20/2020), Personal history of other infectious and parasitic diseases (07/06/2021), Personal history of other infectious and parasitic diseases (02/04/2021), Personal history of other specified conditions (04/12/2016), Personal history of other specified conditions (04/07/2016), Personal history of transient ischemic attack (TIA), and cerebral infarction without residual deficits, Pleural effusion, not elsewhere classified (07/30/2020), Pleural effusion, not elsewhere classified (03/11/2020), Pneumonia due to Streptococcus pneumoniae (CMS-HCC) (03/11/2020), Post-traumatic stress disorder, unspecified, Rash and other nonspecific skin eruption (03/25/2021), Secondary polycythemia (01/23/2020), and Tinea unguium (08/25/2020).    Surgical History  He has a past surgical history that includes Appendectomy (04/07/2016) and IR CVC tunneled (6/28/2023).     Social History  He reports that he has quit smoking. His smoking use included cigarettes. He has never used smokeless tobacco. He reports that he does not currently use alcohol. He reports that he does not use drugs.    Family History  Family history   No family history of kidney disease or kidney failure     Allergies  Penicillins          Review of systems    A complete review of systems was performed and is negative other than as  already described in the history of presenting illness   Respiratory:  Positive for cough, shortness of breath and wheezing.    Physical Exam  ysical Exam  Constitutional:       Appearance: Normal appearance. He is not toxic-appearing.   HENT:      Head: Normocephalic and atraumatic.      Right Ear: External ear normal.      Mouth/Throat:      Mouth: Mucous membranes are moist.      Pharynx: Oropharynx is clear.   Eyes:      General: No scleral icterus.     Conjunctiva/sclera: Conjunctivae normal.      Pupils: Pupils are equal, round, and reactive to light.   Neck:      Vascular: No carotid bruit.   Cardiovascular:      Rate and Rhythm: Normal rate and regular rhythm.      Pulses: Normal pulses.      Heart sounds: Normal heart sounds.   Pulmonary:      Effort: Pulmonary effort is normal.      Breath sounds:  bilateral  rales present.  Up to the level of A  Abdominal:      Palpations: Abdomen is soft.      Tenderness: There is no abdominal tenderness.   Musculoskeletal:         General: No swelling.      Cervical back: No rigidity.      Right lower leg: No edema.      Left lower leg: No edema.   Neurological:      General: No focal deficit present.      Mental Status: He is alert and oriented to person, place, and time. Mental status is at baseline.   Psychiatric:         Mood and Affect: Mood normal.         Behavior: Behavior normal.         Thought Content: Thought content normal.         Judgment: Judgment normal.          I&O 24HR    Intake/Output Summary (Last 24 hours) at 4/23/2024 1548  Last data filed at 4/23/2024 1530  Gross per 24 hour   Intake 1482 ml   Output 2403 ml   Net -921 ml       Vitals 24HR  Heart Rate:  [79-87]   Temp:  [35.6 °C (96 °F)-36.9 °C (98.4 °F)]   Resp:  [18]   BP: (104-160)/(51-70)   Weight:  [80.8 kg (178 lb 2.1 oz)]   SpO2:  [96 %-99 %]              Assessment/Plan       End-stage renal  disease N18.6  Hypertension I12  Dependence on hemodialysis Z99.2  Acute on chronic hypoxemic  respiratory failure J96.21, J96.22  Atrial fibrillation chronic  Anemia of chronic kidney disease     RECOMMENDATIONS:    Will perform dialysis per Monday Wednesday Friday schedule while inpatient, if patient is discharged we will resume 4 days a week outpatient home hemodialysis.  Defer antibiotic therapy to primary service.  Acute respiratory failure is responding to volume management.  Patient has gotten dialyzed 2 days in a row including Sunday Monday and on Tuesday, April 23 I will perform ultrafiltration session with an aim to remove up to 2 L of fluid  Patient appears to be volume overloaded still need to try to get patient with to be below 79 kg's ideally.  During previous admission had got him down to 77 kg.    Hypertension is well-controlled.,  Hemoglobin is quite low at this point resume erythropoietin stimulating agents also there is concern that patient may have dilutional anemia.    Patient remains on Coreg and presently getting cefepime and doxycycline.  Renal multivitamin initiated  We will carefully follow  Thank you for asking us to participate in the management of your patient, please do not hesitate to contact me for any concerns regarding my recommendations as outlined above. I can be easily reached via epic chat                  Jean-Paul Duffy MD

## 2024-04-23 NOTE — PROGRESS NOTES
Renard Ward is a 73 y.o. male on day 2 of admission presenting with COPD exacerbation (Multi).      Subjective   Renard Ward is a 73 y.o. male with PMHx s/f COPD end-stage renal disease hypertension atrial flutter with ablations 2 prior occasions presenting with shortness of breath and chills.  Patient had recently been hospitalized last month and treated for pneumonia COPD exacerbation he progressively had decreased activity tolerance more more short of breath he completed outpatient course of outpatient antibiotics and steroids but continued to get worse.  He also noted that he had decreased urinary output he has continued his home dialysis routine.  He does have chills but denies fevers he is not having much cough or sputum production his respiratory excursion feels tight to him he has had some nausea no abdominal pain no urinary symptoms no diarrhea no constipation no new rashes.  In the emergency department he was afebrile 97.4 heart rate 83 regular respiratory rate 20 blood pressure 129/61 SpO2 87% on 2 L patient had been on is much as 5 L at home.  Blood gas was taken found the patient to be acidotic with elevated pCO2 of 96 pO2 34 subsequently started on BiPAP therapy with improvement in pCO2 and stop and oxygen levels patient now saturating in the low 90s consistently.  Influenza and coronavirus swabs were negative CBC shows mild leukocytosis 11.8 hemoglobin 9.1 hematocrit 30 platelets 196 BN peptide is elevated at 1040 troponin 38 trending down to 33 creatinine 5.87.  Patient had CT of the head no acute changes chest x-ray shows opacification of the right base patchy density in the right lower to mid lung increased from prior exam 5 days prior increased opacification of the left base.  The patient will be admitted for severe shortness of breath hypoxia hypercapnia as he requires BiPAP therapy will obtain nephrology consultation to manage his volume continue treating his respiratory conditions  with IV steroids nebulizer treatments IV antibiotics.      04/22: Patient is feeling better, shortness of breath is improving, weaned off of BiPAP and currently maintaining saturation on 5 L.    04/23: Patient was evaluated this morning, no acute event overnight, shortness of breath is improving.       Objective     Last Recorded Vitals  /51 (BP Location: Left arm, Patient Position: Lying)   Pulse 83   Temp 36.8 °C (98.2 °F) (Temporal)   Resp 18   Wt 80.8 kg (178 lb 2.1 oz)   SpO2 99%   Intake/Output last 3 Shifts:    Intake/Output Summary (Last 24 hours) at 4/23/2024 1316  Last data filed at 4/22/2024 2311  Gross per 24 hour   Intake 1482 ml   Output 2403 ml   Net -921 ml       Admission Weight  Weight: 83 kg (182 lb 15.7 oz) (04/21/24 1006)    Daily Weight  04/23/24 : 80.8 kg (178 lb 2.1 oz)    Image Results  Transthoracic Echo (TTE) [place] [id number]       Phone [phone] Fax [phone]    TRANSTHORACIC ECHOCARDIOGRAM REPORT       Patient Name:      GENEVA Mullins Physician:    [id number] Nathan Feng MD  Study Date:        4/22/2024             Ordering Provider:    [id number] FRANCO WALL  MRN/PID:           [id number]              Fellow:  Accession#:        [id number]          Nurse:  Date of Birth/Age: 1950 / 73 years   Sonographer:          Page Jacob                                                                 [facility]  Gender:            M                     Additional Staff:  Height:            187.96 cm             Admit Date:           4/21/2024  Weight:            82.10 kg              Admission Status:     Inpatient -                                                                 Routine  BSA / BMI:         2.08 m2 / 23.24 kg/m2 Department Location:  [place]  SDU  Blood Pressure: 120 /49 mmHg    Study Type:    TRANSTHORACIC ECHO (TTE) COMPLETE  Diagnosis/ICD: Paroxysmal atrial fibrillation-I48.0  Indication:    A-FIB  CPT Codes:     Echo Complete w Full Doppler-[id number]    Patient History:  Pertinent History: A-FIB.    Study Detail: The following Echo studies were performed: 2D, M-Mode, Doppler and                color flow.       PHYSICIAN INTERPRETATION:  Left Ventricle: Left ventricular systolic function is normal, with an estimated ejection fraction of 60-65%. There are no regional wall motion abnormalities. The left ventricular cavity size is normal. Spectral Doppler shows a normal pattern of left ventricular diastolic filling.  Left Atrium: The left atrium is upper limits of normal in size.  Right Ventricle: The right ventricle is normal in size. There is normal right ventricular global systolic function.  Right Atrium: The right atrium is mildly dilated.  Aortic Valve: The aortic valve is trileaflet. There is no evidence of aortic valve regurgitation. The peak instantaneous gradient of the aortic valve is 13.7 mmHg. The mean gradient of the aortic valve is 9.0 mmHg.  Mitral Valve: The mitral valve is mildly thickened. There is mild mitral valve regurgitation.  Tricuspid Valve: The tricuspid valve is structurally normal. There is mild tricuspid regurgitation. The Doppler estimated RVSP is mildly elevated at 41.2 mmHg.  Pulmonic Valve: The pulmonic valve is structurally normal. There is no indication of pulmonic valve regurgitation.  Pericardium: There is no pericardial effusion noted.  Pleural: There is a moderate left pleural effusion.  Aorta: The aortic root is normal.       CONCLUSIONS:   1. Left ventricular systolic function is normal with a 60-65% estimated ejection fraction.   2. There is a moderate pleural effusion.   3. Mild mitral valve regurgitation.   4. Mild tricuspid regurgitation is visualized.   5. Mildly elevated RVSP.    QUANTITATIVE DATA SUMMARY:  2D  MEASUREMENTS:                            Normal Ranges:  Ao Root d:     2.70 cm    (2.0-3.7cm)  LAs:           3.50 cm    (2.7-4.0cm)  IVSd:          1.10 cm    (0.6-1.1cm)  LVPWd:         1.10 cm    (0.6-1.1cm)  LVIDd:         5.10 cm    (3.9-5.9cm)  LVIDs:         3.00 cm  LV Mass Index: 102.7 g/m2  LV % FS        41.2 %    LA VOLUME:                                Normal Ranges:  LA Vol A4C:        54.4 ml    (22+/-6mL/m2)  LA Vol A2C:        52.0 ml  LA Vol BP:         59.8 ml  LA Vol Index A4C:  26.1ml/m2  LA Vol Index A2C:  24.9 ml/m2  LA Vol Index BP:   28.7 ml/m2  LA Area A4C:       20.7 cm2  LA Area A2C:       18.0 cm2  LA Major Axis A4C: 6.7 cm  LA Major Axis A2C: 5.3 cm  LA Volume Index:   28.7 ml/m2    RA VOLUME BY A/L METHOD:                        Normal Ranges:  RA Area A4C: 22.8 cm2    AORTA MEASUREMENTS:                       Normal Ranges:  Ao Sinus, d: 2.70 cm (2.1-3.5cm)  Ao STJ, d:   2.70 cm (1.7-3.4cm)  Asc Ao, d:   2.80 cm (2.1-3.4cm)    LV SYSTOLIC FUNCTION BY 2D PLANIMETRY (MOD):                      Normal Ranges:  EF-A4C View: 66.8 % (>=55%)  EF-A2C View: 67.1 %  EF-Biplane:  65.4 %    LV DIASTOLIC FUNCTION:                            Normal Ranges:  MV Peak E:    1.27 m/s    (0.7-1.2 m/s)  MV Peak A:    0.81 m/s    (0.42-0.7 m/s)  E/A Ratio:    1.56        (1.0-2.2)  MV e'         0.10 m/s    (>8.0)  MV lateral e' 0.11 m/s  MV medial e'  0.08 m/s  MV A Dur:     114.00 msec  E/e' Ratio:   13.37       (<8.0)    MITRAL VALVE:                       Normal Ranges:  MV Vmax:    1.53 m/s (<=1.3m/s)  MV peak P.4 mmHg (<5mmHg)  MV mean P.0 mmHg (<48mmHg)  MV DT:      188 msec (150-240msec)    MITRAL INSUFFICIENCY:                            Normal Ranges:  PISA Radius:  0.3 cm  MR VTI:       141.00 cm  MR Vmax:      512.50 cm/s  MR Alias Luther: 38.5 cm/s  MR Volume:    5.99 ml  MR Flow Rt:   21.77 ml/s  MR EROA:      0.04 cm2    AORTIC VALVE:                                     Normal  Ranges:  AoV Vmax:                1.85 m/s  (<=1.7m/s)  AoV Peak P.7 mmHg (<20mmHg)  AoV Mean P.0 mmHg  (1.7-11.5mmHg)  LVOT Max Luther:            1.23 m/s  (<=1.1m/s)  AoV VTI:                 38.00 cm  (18-25cm)  LVOT VTI:                26.20 cm  LVOT Diameter:           2.20 cm   (1.8-2.4cm)  AoV Area, VTI:           2.62 cm2  (2.5-5.5cm2)  AoV Area,Vmax:           2.53 cm2  (2.5-4.5cm2)  AoV Dimensionless Index: 0.69       RIGHT VENTRICLE:  RV Basal 3.80 cm  RV Mid   3.30 cm  RV Major 6.9 cm  TAPSE:   21.2 mm  RV s'    0.16 m/s    TRICUSPID VALVE/RVSP:                              Normal Ranges:  Peak TR Velocity: 3.09 m/s  Est. RA Pressure: 3 mmHg  RV Syst Pressure: 41.2 mmHg (< 30mmHg)  IVC Diam:         2.00 cm       [id number] Nathan Feng MD  Electronically signed on 2024 at 12:54:40 PM       ** Final **  ECG 12 lead  Sinus rhythm  Borderline right axis deviation  Baseline wander in lead(s) V3      Physical Exam  Patient is awake and orient, not in apparent distress  Eyes: PERRLA, no conjunctival congestion  Chest: Bilateral decreased air entry, bibasilar crackles, expiratory wheezing.  Heart: s1S2 regular, no murmur  Abdomen: Soft, non tender, BS present  Ext:    Relevant Results               Assessment/Plan      1.  Acute on chronic hypoxic/hypercarbic respiratory failure  Continue oxygen supplementation and wean as tolerated, patient was started on BiPAP and gradually being weaned off and currently maintaining saturation at 5-6.  Baseline oxygen requirement is 5 L    2.  COPD exacerbation  Continue on IV steroid, DuoNeb treatment and antibiotics  Monitor    3.  Pneumonia, presumably gram-negative  Will cover with IV cefepime as patient is allergic to penicillin, vancomycin and Doxy  Monitor  De-escalate antibiotics according to culture and sensitivity report    4.  Acute on chronic diastolic congestive heart failure  Cardiology on board  Dialysis for fluid  management    5.  ESRD  Nephrology on board for dialysis    6.  Acute pulmonary edema secondary to fluid overload  On dialysis for fluid management    7.  Paroxysmal A-fib/flutter  Currently patient on normal sinus rhythm  On anticoagulation              Marya Levin MD

## 2024-04-23 NOTE — PROGRESS NOTES
Renard Ward was referred to the Clinical Pharmacy Team to complete a virtual Transitions of Care encounter for discharge medication optimization. The patient was referred for their [disease state(s)]. The primary goal of this encounter is to ensure the patient's medications are both clinically appropriate and financially feasible for the patient. Pharmacy clinical interventions were provided as noted below.     Attending:   _______________________________________________________________________  PHARMACY ASSESSMENT    Meds to beds? [yes]   Home Pharmacy:   [id number] - [place], [place] - [address]     Allergies Reviewed? [yes]    No issues reported in regards to [accessibility, affordability, adherence, adverse effects, or organization]  _______________________________________________________________________    ASTHMA/COPD ASSESSMENT    CURRENT PHARMACOTHERAPY  apixaban, 5 mg, oral, BID  atorvastatin, 40 mg, oral, Nightly  carvedilol, 6.25 mg, oral, BID with meals  cefepime, 1 g, intravenous, q24h  doxycycline, 100 mg, intravenous, q12h  tiotropium, 2 puff, inhalation, Daily   And  fluticasone furoate-vilanteroL, 1 puff, inhalation, Daily  heparin, 1,000 Units, intra-catheter, After Dialysis  heparin, 1,000 Units, intra-catheter, After Dialysis  LORazepam, 1 mg, intravenous, Once  methIMAzole, 5 mg, oral, Daily  methylPREDNISolone sodium succinate (PF), 40 mg, intravenous, q6h  montelukast, 10 mg, oral, Nightly  perflutren protein A microsphere, 0.5 mL, intravenous, Once in imaging  sodium chloride, 200 mL, intravenous, After Dialysis    PRN medications: ipratropium-albuteroL, oxygen    HISTORICAL PHARMACOTHERAPY  Current Outpatient Medications on File Prior to Encounter   Medication Sig Dispense Refill    atorvastatin (Lipitor) 40 mg tablet Take 1 tablet (40 mg) by mouth once daily at bedtime. 90 tablet 3    azithromycin (Zithromax) 250 mg tablet Take 2 tabs (500 mg) by mouth today, than 1 daily for 4  days. 6 tablet 0    carvedilol (Coreg) 6.25 mg tablet Take 1 tablet (6.25 mg) by mouth 2 times a day with meals.      cyanocobalamin, vitamin B-12, (Vitamin B-12) 1,000 mcg tablet extended release Take 1 tablet (1,000 mcg) by mouth once daily. 90 tablet 0    Eliquis 5 mg tablet Take 1 tablet (5 mg) by mouth 2 times a day.      ergocalciferol (Vitamin D-2) 1.25 MG (52180 UT) capsule Take 1 capsule (1,250 mcg) by mouth 1 (one) time per week.      famotidine (Pepcid) 40 mg tablet Take 1 tablet (40 mg) by mouth 2 times a day. 180 tablet 3    fluticasone-umeclidin-vilanter (Trelegy Ellipta) 100-62.5-25 mcg blister with device Inhale 1 puff once daily. 1 each 11    methIMAzole (Tapazole) 5 mg tablet Take 1 tablet (5 mg) by mouth once daily. 30 tablet 3    montelukast (Singulair) 10 mg tablet TAKE ONE TABLET BY MOUTH DAILY AT BEDTIME 90 tablet 0    predniSONE (Deltasone) 20 mg tablet Take 3 tabs (60mg) daily for 3 days, then take 2 tabs (40mg) daily for 3 days, then take 1 tab (20mg) daily for 3 days. 18 tablet 0    Mary Grace-Darryl Rx 1- mg-mg-mcg tablet Take 1 tablet by mouth once daily.      torsemide (Demadex) 20 mg tablet Take 1 tablet (20 mg) by mouth twice a day.      [DISCONTINUED] losartan (Cozaar) 100 mg tablet Take 1 tablet (100 mg) by mouth once daily.       RESCUE INHALER USE  - 3-4 times per week    INHALER TECHNIQUE  - uses correct technique    SECONDARY PREVENTION  Flu vaccine, quadrivalent, high-dose, preservative free, age 65y+ (FLUZONE)10/24/2023  Influenza, High Dose Seasonal, Preservative Free3/12/2019, 2/6/2018, 12/5/2016  Influenza, seasonal, injectable1/1/2021, 9/25/2015, 10/30/2014,  ... (5 more)  Moderna SARS-CoV-2 Vyyowqsvbyr31/7/2021, 4/16/2021, 3/19/2021  Pneumococcal conjugate vaccine, 20-valent (PREVNAR 20)4/13/2022  Td vaccine, age 7 years and older (TDVAX)1/1/2002  Zoster vaccine, recombinant, adult (SHINGRIX)8/20/2020    EXACERBATION HISTORY  - When was your last hospitalization for an  exacerbation? 3/5/24 (also had pneumonia)  - When was the last time you were treated with antibiotics and/or steroids? March admission    SMOKING CESSATION  Patient is not currently using tobacco products  - Quit Date: former smoker - several years  - Years Smoking: most of life    ___________________________________________________________________  PATIENT EDUCATION/GOALS  - Fasting B - 130 mg/dL  - Postprandial BG: less than 180 mg/dL  - A1c: less than 7%  - LDL Goal: < 70mg/dL  - Answered all patient questions and concerns    - Your blood pressure goal is less than 130/80 mmHg  - Try to integrate exercise into your weekly routine. The recommended exercise regimen is 30-40 minutes of moderate-intensity exercise (such as jogging, biking, swimming, etc.) for 5 days a week.  - Try to work on eating healthy, balanced, appropriately portioned meals 3 times a day. The DASH diet is the recommended diet plan for patients with high blood pressure.  -Limit salt intake and avoid foods high in sodium such as processed meats, salty snacks, and fast food. The recommended daily maximum sodium intake is less than 2,300mg (2 teaspoons) per day.  -Limit cream/sugar additions to coffee and avoid unhealthy caffeinated drinks such as energy drinks or soda.      -Educated patient on proper inhaler technique and proper inhaler cleaning.    _______________________________________________________________________  RECOMMENDATIONS/PLAN  Please send prescriptions to [facility] pharmacy for assistance on insurance prior authorization and copay. Prescriptions will be delivered to the patient's bedside prior to discharge with the Meds to Beds program.   Recommend starting [Insert med and dosing strategy]  Refill rescue inhaler  Ensure maintenance inhaler is filled regularly and used daily/ BID  Continuation of care will be provided by the outpatient clinical pharmacy team in collaboration with the patient's  Primary Care Provider      Verbal consent to manage patient's drug therapy was obtained from the patient. They were informed they may decline to participate or withdraw from participation in pharmacy services at any time.

## 2024-04-23 NOTE — PROGRESS NOTES
Occupational Therapy    Evaluation    Patient Name: Renard Ward  MRN: [id number]  Today's Date: 4/23/2024  Time Calculation  Start Time: 1132  Stop Time: 1145  Time Calculation (min): 13 min    Assessment  IP OT Assessment  OT Assessment: OT eval completed. The patient is functioning below baseline for ADLs and mobility. can benefit from continued OT  End of Session Communication: Bedside nurse  End of Session Patient Position: Bed, 3 rail up, Alarm on    Plan:  Treatment Interventions: ADL retraining, Functional transfer training, UE strengthening/ROM, Endurance training, Cognitive reorientation, Neuromuscular reeducation  OT Frequency: 3 times per week  OT Discharge Recommendations: Low intensity level of continued care  OT - OK to Discharge: Yes To next level of care, with consideration of recommendations established on OT eval, and once cleared by medical team/physician for DC.     Subjective     Current Problem:  1. COPD exacerbation (Multi)        2. Pneumonia due to infectious organism, unspecified laterality, unspecified part of lung        3. Hypervolemia, unspecified hypervolemia type        4. Paroxysmal A-fib (Multi)  Transthoracic Echo (TTE) Complete    Transthoracic Echo (TTE) Complete    Nuclear Stress Test    Nuclear Stress Test      5. Other hypervolemia  Transthoracic Echo (TTE) Complete    Transthoracic Echo (TTE) Complete      6. Cor pulmonale (chronic) (Multi)  Transthoracic Echo (TTE) Complete    Transthoracic Echo (TTE) Complete      7. Atrial flutter, paroxysmal (Multi)  Nuclear Stress Test    Nuclear Stress Test      8. Acute diastolic heart failure (Multi)        9. Dyspnea on exertion  Nuclear Stress Test    Nuclear Stress Test          General:  General  Reason for Referral: ADLs, safety assessment  Referred By: Poonam  Past Medical History Relevant to Rehab:   Past Medical History:   Diagnosis Date    Abnormal level of blood mineral 02/22/2021    Abnormal level of blood mineral     Acute diastolic (congestive) heart failure (Multi) 06/18/2020    Acute diastolic congestive heart failure    Acute kidney failure, unspecified (CMS-HCC) 06/16/2020    ALMAS (acute kidney injury)    Acute respiratory failure with hypoxia (Multi) 03/11/2020    Acute respiratory failure with hypoxia    Cellulitis of left toe 08/20/2020    Cellulitis of great toe of left foot    Chronic obstructive pulmonary disease, unspecified (Multi) 09/21/2022    COPD, severe    Chronic respiratory failure with hypoxia (Multi) 09/21/2023    Cor pulmonale (chronic) (Multi) 01/03/2022    Cor pulmonale, chronic    Encounter for screening for malignant neoplasm of colon 10/19/2022    Colon cancer screening    Infective pericarditis ([facility]) 06/16/2020    Acute viral pericarditis    Iron deficiency anemia secondary to blood loss (chronic) 11/19/2020    Iron deficiency anemia due to chronic blood loss    Nicotine dependence, unspecified, uncomplicated 01/14/2020    Needs smoking cessation education    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    Nontoxic multinodular goiter    Obstructive sleep apnea (adult) (pediatric) 03/11/2020    LEO and COPD overlap syndrome    Other allergic rhinitis 02/23/2021    Other allergic rhinitis    Other fecal abnormalities 07/30/2021    Positive occult stool blood test    Other hypersomnia 01/23/2020    Excessive daytime sleepiness    Other hypersomnia 01/23/2020    Daytime hypersomnolence    Other ill-defined heart diseases 04/27/2020    Grade I diastolic dysfunction    Other pericardial effusion (noninflammatory) ([facility]) 06/18/2020    Pericardial effusion without cardiac tamponade    Other specified personal risk factors, not elsewhere classified 07/30/2020    At risk for amiodarone toxicity with long term use    Other specified symptoms and signs involving the circulatory and respiratory systems 10/10/2019    Bruit of left carotid artery    Personal history of diseases of the skin and subcutaneous tissue  11/19/2020    History of psoriasis    Personal history of nicotine dependence 08/18/2021    History of tobacco use disorder    Personal history of other diseases of the digestive system     History of esophageal reflux    Personal history of other diseases of the nervous system and sense organs     History of obstructive sleep apnea    Personal history of other endocrine, nutritional and metabolic disease 11/19/2020    History of vitamin D deficiency    Personal history of other endocrine, nutritional and metabolic disease 10/14/2020    History of hyperlipidemia    Personal history of other endocrine, nutritional and metabolic disease 08/20/2020    History of hyperthyroidism    Personal history of other infectious and parasitic diseases 07/06/2021    History of Clostridioides difficile colitis    Personal history of other infectious and parasitic diseases 02/04/2021    History of scabies    Personal history of other specified conditions 04/12/2016    History of snoring    Personal history of other specified conditions 04/07/2016    History of dizziness    Personal history of transient ischemic attack (TIA), and cerebral infarction without residual deficits     History of stroke    Pleural effusion, not elsewhere classified 07/30/2020    Bilateral pleural effusion    Pleural effusion, not elsewhere classified 03/11/2020    Pleural effusion, left    Pneumonia due to Streptococcus pneumoniae (CMS-HCC) 03/11/2020    Pneumonia of both lower lobes due to Streptococcus pneumoniae    Post-traumatic stress disorder, unspecified     PTSD (post-traumatic stress disorder)    Rash and other nonspecific skin eruption 03/25/2021    Rash    Secondary polycythemia 01/23/2020    Polycythemia secondary to smoking    Tinea unguium 08/25/2020    Onychomycosis of toenail      Missed Visit: Yes  Missed Visit Reason: Patient in a medical procedure (OT eval attempted. pt currently in dialysis.)  Family/Caregiver Present: Yes  Caregiver  Feedback: grandson and daughter  Prior to Session Communication: Bedside nurse  Patient Position Received: Bed, 3 rail up, Alarm on  General Comment: pt alert and agreeable to therapy    Precautions:  Medical Precautions: Fall precautions, Oxygen therapy device and L/min        Pain:  Pain Assessment  Pain Assessment: 0-10  Pain Score: 0 - No pain    Objective     Cognition:  Overall Cognitive Status: Within Functional Limits  Orientation Level:  (oriented to person, place, time)             Home Living:  Type of Home: House  Lives With: Spouse  Home Adaptive Equipment: None  Home Layout: One level  Home Access: Stairs to enter with rails  Entrance Stairs-Number of Steps: 3-4     Prior Function:  Receives Help From: Family  ADL Assistance: Independent  Homemaking Assistance: Needs assistance  Ambulatory Assistance: Independent (no AD)  Prior Function Comments: wife performs pt's dialysis at home        ADL:  Eating Assistance: Stand by  Grooming Assistance: Minimal  Bathing Assistance: Maximal  UE Dressing Assistance: Minimal  LE Dressing Assistance: Moderate  Toileting Assistance with Device: Moderate  ADL Comments: anticipated current levels of assistance    Activity Tolerance:  Endurance: Decreased tolerance for upright activites    Bed Mobility/Transfers:   Bed Mobility  Bed Mobility: Yes  Bed Mobility 1  Bed Mobility 1: Supine to sitting, Sitting to supine  Level of Assistance 1: Contact guard  Transfers  Transfer: Yes  Transfer 1  Transfer From 1: Sit to  Transfer to 1: Stand  Technique 1: Stand to sit  Transfer Level of Assistance 1: Minimum assistance (x1)    Ambulation/Gait Training:  Functional Mobility  Functional Mobility Performed:  (pt performed functional mobility a few steps towards HOB with min A x1)    Sitting Balance:  Static Sitting Balance  Static Sitting-Level of Assistance: Close supervision  Dynamic Sitting Balance  Dynamic Sitting-Comments: supervision    Standing Balance:  Static Standing  Balance  Static Standing-Level of Assistance: Contact guard, Minimum assistance  Dynamic Standing Balance  Dynamic Standing-Comments: CGA to min A    Vision: Vision - Basic Assessment  Current Vision: No visual deficits   and      Sensation:  Light Touch: No apparent deficits    Strength:  Strength Comments: BUE grossly 4-/5    Perception:  Inattention/Neglect: Appears intact    Coordination:  Movements are Fluid and Coordinated: Yes     Hand Function:  Hand Function  Gross Grasp: Functional    Extremities: RUE   RUE : Within Functional Limits and LUE   LUE: Within Functional Limits    Outcome Measures: [facility] Daily Activity  Putting on and taking off regular lower body clothing: A lot  Bathing (including washing, rinsing, drying): A lot  Putting on and taking off regular upper body clothing: A lot  Toileting, which includes using toilet, bedpan or urinal: A lot  Taking care of personal grooming such as brushing teeth: A little  Eating Meals: A little  Daily Activity - Total Score: 14                    EDUCATION:     Education Documentation  ADL Training, taught by Kathi Pate OT at 4/23/2024  1:15 PM.  Learner: Patient  Readiness: Acceptance  Method: Explanation  Response: Needs Reinforcement    Education Comments  No comments found.        Goals:   Encounter Problems       Encounter Problems (Active)       ADLs       Patient with complete lower body dressing with modified independent level of assistance donning and doffing all LE clothes  with PRN adaptive equipment while supported sitting and standing (Progressing)       Start:  04/23/24    Expected End:  05/07/24               VISION       Patient will navigate environments with high visual stimuli safely Without loss of balance and Attending to obstacles with modified independent level of assistance. (Progressing)       Start:  04/23/24    Expected End:  05/07/24

## 2024-04-24 ENCOUNTER — APPOINTMENT (OUTPATIENT)
Dept: RADIOLOGY | Facility: HOSPITAL | Age: 74
DRG: 286 | End: 2024-04-24
Payer: MEDICARE

## 2024-04-24 ENCOUNTER — APPOINTMENT (OUTPATIENT)
Dept: DIALYSIS | Facility: HOSPITAL | Age: 74
End: 2024-04-24
Payer: MEDICARE

## 2024-04-24 ENCOUNTER — APPOINTMENT (OUTPATIENT)
Dept: CARDIOLOGY | Facility: HOSPITAL | Age: 74
DRG: 286 | End: 2024-04-24
Payer: MEDICARE

## 2024-04-24 LAB
ANION GAP SERPL CALC-SCNC: 12 MMOL/L (ref 10–20)
ATRIAL RATE: 68 BPM
BUN SERPL-MCNC: 61 MG/DL (ref 6–23)
CALCIUM SERPL-MCNC: 8.6 MG/DL (ref 8.6–10.3)
CHLORIDE SERPL-SCNC: 101 MMOL/L (ref 98–107)
CO2 SERPL-SCNC: 27 MMOL/L (ref 21–32)
CREAT SERPL-MCNC: 5.34 MG/DL (ref 0.5–1.3)
EGFRCR SERPLBLD CKD-EPI 2021: 11 ML/MIN/1.73M*2
ERYTHROCYTE [DISTWIDTH] IN BLOOD BY AUTOMATED COUNT: 15.2 % (ref 11.5–14.5)
GLUCOSE SERPL-MCNC: 115 MG/DL (ref 74–99)
HCT VFR BLD AUTO: 25.6 % (ref 41–52)
HGB BLD-MCNC: 7.5 G/DL (ref 13.5–17.5)
MAGNESIUM SERPL-MCNC: 1.7 MG/DL (ref 1.6–2.4)
MCH RBC QN AUTO: 29.3 PG (ref 26–34)
MCHC RBC AUTO-ENTMCNC: 29.3 G/DL (ref 32–36)
MCV RBC AUTO: 100 FL (ref 80–100)
NRBC BLD-RTO: 0 /100 WBCS (ref 0–0)
P AXIS: 58 DEGREES
PLATELET # BLD AUTO: 176 X10*3/UL (ref 150–450)
POTASSIUM SERPL-SCNC: 4.4 MMOL/L (ref 3.5–5.3)
PR INTERVAL: 174 MS
Q ONSET: 252 MS
QRS COUNT: 11 BEATS
QRS DURATION: 109 MS
QT INTERVAL: 390 MS
QTC CALCULATION(BAZETT): 412 MS
QTC FREDERICIA: 404 MS
R AXIS: 86 DEGREES
RBC # BLD AUTO: 2.56 X10*6/UL (ref 4.5–5.9)
SODIUM SERPL-SCNC: 136 MMOL/L (ref 136–145)
T AXIS: 27 DEGREES
T OFFSET: 447 MS
VENTRICULAR RATE: 67 BPM
WBC # BLD AUTO: 14.1 X10*3/UL (ref 4.4–11.3)

## 2024-04-24 PROCEDURE — 6350000001 HC RX 635 EPOETIN >10,000 UNITS: Mod: JW | Performed by: INTERNAL MEDICINE

## 2024-04-24 PROCEDURE — 94660 CPAP INITIATION&MGMT: CPT

## 2024-04-24 PROCEDURE — 83735 ASSAY OF MAGNESIUM: CPT | Performed by: INTERNAL MEDICINE

## 2024-04-24 PROCEDURE — 85027 COMPLETE CBC AUTOMATED: CPT | Performed by: INTERNAL MEDICINE

## 2024-04-24 PROCEDURE — 2500000004 HC RX 250 GENERAL PHARMACY W/ HCPCS (ALT 636 FOR OP/ED): Performed by: NURSE PRACTITIONER

## 2024-04-24 PROCEDURE — 2500000001 HC RX 250 WO HCPCS SELF ADMINISTERED DRUGS (ALT 637 FOR MEDICARE OP): Performed by: NURSE PRACTITIONER

## 2024-04-24 PROCEDURE — 36415 COLL VENOUS BLD VENIPUNCTURE: CPT | Performed by: INTERNAL MEDICINE

## 2024-04-24 PROCEDURE — 3430000001 HC RX 343 DIAGNOSTIC RADIOPHARMACEUTICALS: Performed by: INTERNAL MEDICINE

## 2024-04-24 PROCEDURE — A9502 TC99M TETROFOSMIN: HCPCS | Performed by: INTERNAL MEDICINE

## 2024-04-24 PROCEDURE — 2500000004 HC RX 250 GENERAL PHARMACY W/ HCPCS (ALT 636 FOR OP/ED): Performed by: INTERNAL MEDICINE

## 2024-04-24 PROCEDURE — 82374 ASSAY BLOOD CARBON DIOXIDE: CPT | Performed by: INTERNAL MEDICINE

## 2024-04-24 PROCEDURE — 78452 HT MUSCLE IMAGE SPECT MULT: CPT | Performed by: INTERNAL MEDICINE

## 2024-04-24 PROCEDURE — 2060000001 HC INTERMEDIATE ICU ROOM DAILY

## 2024-04-24 PROCEDURE — 97161 PT EVAL LOW COMPLEX 20 MIN: CPT | Mod: GP

## 2024-04-24 PROCEDURE — 78452 HT MUSCLE IMAGE SPECT MULT: CPT

## 2024-04-24 PROCEDURE — 93017 CV STRESS TEST TRACING ONLY: CPT

## 2024-04-24 PROCEDURE — 2500000001 HC RX 250 WO HCPCS SELF ADMINISTERED DRUGS (ALT 637 FOR MEDICARE OP): Performed by: INTERNAL MEDICINE

## 2024-04-24 PROCEDURE — 8010000001 HC DIALYSIS - HEMODIALYSIS PER DAY

## 2024-04-24 PROCEDURE — 99233 SBSQ HOSP IP/OBS HIGH 50: CPT | Performed by: INTERNAL MEDICINE

## 2024-04-24 PROCEDURE — 2500000004 HC RX 250 GENERAL PHARMACY W/ HCPCS (ALT 636 FOR OP/ED): Performed by: CLINICAL NURSE SPECIALIST

## 2024-04-24 RX ORDER — PREDNISONE 10 MG/1
10 TABLET ORAL DAILY
Qty: 3 TABLET | Refills: 0 | Status: DISCONTINUED | OUTPATIENT
Start: 2024-05-01 | End: 2024-04-27 | Stop reason: HOSPADM

## 2024-04-24 RX ORDER — PREDNISONE 5 MG/1
5 TABLET ORAL DAILY
Qty: 3 TABLET | Refills: 0 | Status: DISCONTINUED | OUTPATIENT
Start: 2024-05-04 | End: 2024-04-27 | Stop reason: HOSPADM

## 2024-04-24 RX ORDER — PREDNISONE 20 MG/1
20 TABLET ORAL DAILY
Qty: 3 TABLET | Refills: 0 | Status: DISCONTINUED | OUTPATIENT
Start: 2024-04-28 | End: 2024-04-27 | Stop reason: HOSPADM

## 2024-04-24 RX ORDER — PREDNISONE 20 MG/1
40 TABLET ORAL DAILY
Qty: 6 TABLET | Refills: 0 | Status: COMPLETED | OUTPATIENT
Start: 2024-04-25 | End: 2024-04-27

## 2024-04-24 RX ORDER — REGADENOSON 0.08 MG/ML
0.4 INJECTION, SOLUTION INTRAVENOUS
Status: COMPLETED | OUTPATIENT
Start: 2024-04-24 | End: 2024-04-24

## 2024-04-24 RX ORDER — CEFTRIAXONE 1 G/50ML
1 INJECTION, SOLUTION INTRAVENOUS EVERY 24 HOURS
Status: DISCONTINUED | OUTPATIENT
Start: 2024-04-24 | End: 2024-04-26

## 2024-04-24 RX ADMIN — TETROFOSMIN 30 MILLICURIE: 0.23 INJECTION, POWDER, LYOPHILIZED, FOR SOLUTION INTRAVENOUS at 10:10

## 2024-04-24 RX ADMIN — MONTELUKAST 10 MG: 10 TABLET, FILM COATED ORAL at 20:54

## 2024-04-24 RX ADMIN — CEFEPIME 1 G: 1 INJECTION, SOLUTION INTRAVENOUS at 11:38

## 2024-04-24 RX ADMIN — TIOTROPIUM BROMIDE INHALATION SPRAY 2 PUFF: 3.12 SPRAY, METERED RESPIRATORY (INHALATION) at 08:06

## 2024-04-24 RX ADMIN — REGADENOSON 0.4 MG: 0.08 INJECTION, SOLUTION INTRAVENOUS at 10:15

## 2024-04-24 RX ADMIN — METHYLPREDNISOLONE SODIUM SUCCINATE 40 MG: 40 INJECTION, POWDER, FOR SOLUTION INTRAMUSCULAR; INTRAVENOUS at 08:12

## 2024-04-24 RX ADMIN — EPOETIN ALFA-EPBX 5000 UNITS: 10000 INJECTION, SOLUTION INTRAVENOUS; SUBCUTANEOUS at 18:41

## 2024-04-24 RX ADMIN — Medication 1 CAPSULE: at 08:06

## 2024-04-24 RX ADMIN — DOXYCYCLINE 100 MG: 100 INJECTION, POWDER, LYOPHILIZED, FOR SOLUTION INTRAVENOUS at 18:41

## 2024-04-24 RX ADMIN — METHIMAZOLE 5 MG: 5 TABLET ORAL at 08:06

## 2024-04-24 RX ADMIN — METHYLPREDNISOLONE SODIUM SUCCINATE 40 MG: 40 INJECTION, POWDER, FOR SOLUTION INTRAMUSCULAR; INTRAVENOUS at 03:00

## 2024-04-24 RX ADMIN — ATORVASTATIN CALCIUM 40 MG: 40 TABLET, FILM COATED ORAL at 20:54

## 2024-04-24 RX ADMIN — CARVEDILOL 6.25 MG: 6.25 TABLET, FILM COATED ORAL at 08:06

## 2024-04-24 RX ADMIN — CARVEDILOL 6.25 MG: 6.25 TABLET, FILM COATED ORAL at 18:41

## 2024-04-24 RX ADMIN — APIXABAN 5 MG: 5 TABLET, FILM COATED ORAL at 08:09

## 2024-04-24 RX ADMIN — DOXYCYCLINE 100 MG: 100 INJECTION, POWDER, LYOPHILIZED, FOR SOLUTION INTRAVENOUS at 03:01

## 2024-04-24 RX ADMIN — CEFTRIAXONE SODIUM 1 G: 1 INJECTION, SOLUTION INTRAVENOUS at 20:49

## 2024-04-24 RX ADMIN — FLUTICASONE FUROATE AND VILANTEROL TRIFENATATE 1 PUFF: 100; 25 POWDER RESPIRATORY (INHALATION) at 08:05

## 2024-04-24 RX ADMIN — TETROFOSMIN 10 MILLICURIE: 0.23 INJECTION, POWDER, LYOPHILIZED, FOR SOLUTION INTRAVENOUS at 08:25

## 2024-04-24 ASSESSMENT — COGNITIVE AND FUNCTIONAL STATUS - GENERAL
DRESSING REGULAR UPPER BODY CLOTHING: A LOT
WALKING IN HOSPITAL ROOM: A LITTLE
CLIMB 3 TO 5 STEPS WITH RAILING: TOTAL
MOVING TO AND FROM BED TO CHAIR: A LITTLE
CLIMB 3 TO 5 STEPS WITH RAILING: TOTAL
TOILETING: A LOT
DAILY ACTIVITIY SCORE: 15
MOBILITY SCORE: 19
HELP NEEDED FOR BATHING: A LOT
MOVING TO AND FROM BED TO CHAIR: A LITTLE
WALKING IN HOSPITAL ROOM: A LITTLE
MOBILITY SCORE: 19
PERSONAL GROOMING: A LITTLE
DRESSING REGULAR LOWER BODY CLOTHING: A LOT

## 2024-04-24 ASSESSMENT — PAIN SCALES - GENERAL
PAINLEVEL_OUTOF10: 0 - NO PAIN

## 2024-04-24 ASSESSMENT — PAIN - FUNCTIONAL ASSESSMENT
PAIN_FUNCTIONAL_ASSESSMENT: 0-10

## 2024-04-24 ASSESSMENT — ACTIVITIES OF DAILY LIVING (ADL): ADL_ASSISTANCE: INDEPENDENT

## 2024-04-24 NOTE — PROGRESS NOTES
"Subjective Data:  Mr. Ward is resting in bed, no acute distress. Remains short of breath at rest and with exertion. He denies chest pain or pressure but continues to feel \"fluttering in my chest at times\". Monitor demonstrates SR with rates in the 80s.     Today, Mr. Ward is sitting in bed getting HD. He denies chest pain or pressure, reports he feels occasional flutter sensation in his chest, shortness of breath has improved, no dizziness or lightheadedness with ambulation today. Monitor demonstrates Afib with rates in the 80-90s this afternoon.     Overnight Events:    Afib     Objective Data:  Last Recorded Vitals:  Vitals:    04/24/24 0847 04/24/24 1122 04/24/24 1125 04/24/24 1222   BP: 124/50   (!) 119/47   BP Location: Right arm   Right arm   Patient Position: Lying   Lying   Pulse: 78 103  73   Resp: 18   18   Temp: 36.6 °C (97.8 °F)   36.7 °C (98.1 °F)   TempSrc: Temporal   Temporal   SpO2: 98% (!) 89% 98% 95%   Weight:       Height:           Last Labs:  CBC - 4/24/2024:  4:33 AM  14.1 7.5 176    25.6      CMP - 4/24/2024:  4:33 AM  8.6 6.3 12 --- 0.4   CANCELED 4.0 14 59      PTT - No results in last year.  1.5   16.9 _     TROPHS   Date/Time Value Ref Range Status   04/21/2024 11:11 AM 33 0 - 20 ng/L Final   04/21/2024 10:23 AM 38 0 - 20 ng/L Final   03/05/2024 11:03 AM 20 0 - 20 ng/L Final     BNP   Date/Time Value Ref Range Status   04/21/2024 10:23 AM 1,040 0 - 99 pg/mL Final   03/05/2024 10:20  0 - 99 pg/mL Final     HGBA1C   Date/Time Value Ref Range Status   12/10/2019 08:46 AM 6.1 % Final     Comment:          Diagnosis of Diabetes-Adults   Non-Diabetic: < or = 5.6%   Increased risk for developing diabetes: 5.7-6.4%   Diagnostic of diabetes: > or = 6.5%  .       Monitoring of Diabetes                Age (y)     Therapeutic Goal (%)   Adults:          >18           <7.0   Pediatrics:    13-18           <7.5                   7-12           <8.0                   0- 6            7.5-8.5   " "American Diabetes Association. Diabetes Care 33(S1), Jan 2010.       VLDL   Date/Time Value Ref Range Status   04/14/2023 08:08 AM 32 0 - 40 mg/dL Final   04/11/2022 08:48 AM 39 0 - 40 mg/dL Final   05/25/2021 11:09 AM 23 0 - 40 mg/dL Final      Last I/O:  I/O last 3 completed shifts:  In: 1504 (18.8 mL/kg) [P.O.:504; I.V.:200 (2.5 mL/kg); Other:400; IV Piggyback:400]  Out: 2000 (25 mL/kg) [Other:2000]  Weight: 80.1 kg     Past Cardiology Tests (Last 3 Years):  EKG:  ECG 12 lead 04/21/2024 (Preliminary)      ECG 12 Lead 03/05/2024      ECG 12 lead (Clinic Performed) 12/06/2023      ECG 12 Lead     Echo:  Transthoracic Echo (TTE) Complete 04/22/2024  Left ventricular systolic function is normal with a 60-65% estimated ejection fraction.   2. There is a moderate pleural effusion.   3. Mild mitral valve regurgitation.   4. Mild tricuspid regurgitation is visualized.   5. Mildly elevated RVSP.  Ejection Fractions:  No results found for: \"EF\"  Cath:  No results found for this or any previous visit from the past 1095 days.    Stress Test:  NM Stress test 4/24/24:  1. There is a small, mild intensity, partially reversible perfusion  defect in the apical and apical inferior walls concerning for  ischemia. Low risk sum difference score of 1.  2. Calculated ejection fraction of 69% without segmental wall motion  abnormality seen.    Cardiac Imaging:  No results found for this or any previous visit from the past 1095 days.      Inpatient Medications:  Scheduled medications   Medication Dose Route Frequency    apixaban  5 mg oral BID    atorvastatin  40 mg oral Nightly    B complex-vitamin C-folic acid  1 capsule oral Daily    carvedilol  6.25 mg oral BID with meals    cefepime  1 g intravenous q24h    doxycycline  100 mg intravenous q12h    epoetin jennifer or biosimilar  5,000 Units subcutaneous Once per day on Monday Wednesday Friday    tiotropium  2 puff inhalation Daily    And    fluticasone furoate-vilanteroL  1 puff " inhalation Daily    heparin  1,000 Units intra-catheter After Dialysis    heparin  1,000 Units intra-catheter After Dialysis    LORazepam  1 mg intravenous Once    methIMAzole  5 mg oral Daily    methylPREDNISolone sodium succinate (PF)  40 mg intravenous q6h    montelukast  10 mg oral Nightly    perflutren protein A microsphere  0.5 mL intravenous Once in imaging    sodium chloride  200 mL intravenous After Dialysis     PRN medications   Medication    ipratropium-albuteroL    oxygen     Continuous Medications   Medication Dose Last Rate       Physical Exam:  Physical Exam  Vitals reviewed.   Constitutional:       Appearance: Normal appearance.   HENT:      Head: Normocephalic.      Mouth/Throat:      Mouth: Mucous membranes are moist.   Cardiovascular:      Rate and Rhythm: Normal rate and regular rhythm.      Pulses: Normal pulses.      Heart sounds: Normal heart sounds.   Pulmonary:      Effort: Pulmonary effort is normal.      Breath sounds: No wheezing, rhonchi or rales.   Abdominal:      General: Bowel sounds are normal. There is no distension.      Palpations: Abdomen is soft.      Tenderness: There is no abdominal tenderness.   Musculoskeletal:      Cervical back: Normal range of motion.      Right lower leg: No edema.      Left lower leg: No edema.   Skin:     General: Skin is warm and dry.   Neurological:      General: No focal deficit present.      Mental Status: He is alert and oriented to person, place, and time.   Psychiatric:         Mood and Affect: Mood normal.         Behavior: Behavior normal.           Assessment/Plan   1-acute hypoxic and hypercarbic respiratory failure-appears mostly to be heart failure although patient does have history of aspiration pneumonia and possibility needs to be ruled out.  We will check an echocardiogram and if LV function is preserved arrange for ischemic evaluation and continue to monitor on telemetry for A-fib burden to rule out any reversible cardiovascular cause  of heart failure.  He will likely need to increase dialysis volume outpatient and I would leave it to the expert hands of his nephrologist.     Discussed with Dr. Levin.    4/23/24: Patient remains short of breath this morning despite 2 sessions of HD with 2L removed with each session. Echo with normal LV systolic function with EF 60-65%. Patient agreeable to proceed with stress test, will order.     4/24/24: Stress test with small, mild intensity, partially reversible perfusion defect in the apical and apical inferior walls concerning for ischemia. Left heart catheterization procedure recommended, discussed results with patient and Dr. Feng, patient is agreeable to proceed with procedure. He had last dose of Apixaban today, will have to plan for procedure to be on Friday 4/26.     2-history of atrial flutter-seems to be in sinus rhythm but will monitor on telemetry as he is stating he is getting palpitations often.  Depending on burden of atrial arrhythmia if any may need another ablation or antiarrhythmic.  Unfortunately in the past though amiodarone did not work very well.  Continue anticoagulation.    4/23/24: Continues to feel palpitations at times, maintaining SR with rates in the 80s today. Continue oral anticoagulation with apixaban, no abnormal bleeding reported. Recommend 14 day Holter monitor at discharge to evaluate burden of Afib.    4/24/24: Patient noted to be back in Afib starting last evening, rates controlled in the 80-90s, continues to have occasional fluttering but reports not significant at this time. Will need to hold Apixaban for LHC procedure on Friday. Continue on carvedilol 6.25 mg BID.       Code Status:  Full Code      Tracy Wagner, APRN-CNP

## 2024-04-24 NOTE — POST-PROCEDURE NOTE
Report to Receiving RN:    Report To: malcolm jones   Time Report Called: [id number]  Hand-Off Communication: stable did good.   Complications During Treatment: No  Ultrafiltration Treatment: No  Medications Administered During Dialysis: No  Blood Products Administered During Dialysis: No  Labs Sent During Dialysis: No  Heparin Drip Rate Changes: No  Dialysis Catheter Dressing: clean dry intact  Last Dressing Change: 4/24/24    Electronic Signatures:  Jordon Hughes RN (Signed )   Authored:    (Signed )   Authored:     Last Updated: 4:52 PM by JORDON HUGHES

## 2024-04-24 NOTE — PRE-PROCEDURE NOTE
Report from Sending RN:    Report From: malcolm jones   Recent Surgery of Procedure: No  Baseline Level of Consciousness (LOC): x4  Oxygen Use: Yes  Type: NC   Diabetic: No  Last BP Med Given Day of Dialysis: see emar   Last Pain Med Given: see emar   Lab Tests to be Obtained with Dialysis: No  Blood Transfusion to be Given During Dialysis: No  Available IV Access: No  Medications to be Administered During Dialysis: No  Continuous IV Infusion Running: No  Restraints on Currently or in the Last 24 Hours: N/A  Hand-Off Communication: stable for hd   Dialysis Catheter Dressing: clean dry intact   Last Dressing Change:

## 2024-04-24 NOTE — PROGRESS NOTES
Occupational Therapy                 Therapy Communication Note    Patient Name: Renard Ward  MRN: [id number]  Today's Date: 4/24/2024     Discipline: Occupational Therapy    Missed Visit Reason: Missed Visit Reason: Patient in a medical procedure (pt. recieving dialysis)    Missed Time: Attempt    Comment:

## 2024-04-24 NOTE — PROGRESS NOTES
Physical Therapy    Physical Therapy Evaluation    Patient Name: Renard Ward  MRN: [id number]  Today's Date: 4/24/2024   Time Calculation  Start Time: 1122  Stop Time: 1135  Time Calculation (min): 13 min    Assessment/Plan   PT Assessment  PT Assessment Results: Decreased endurance, Decreased mobility  Rehab Prognosis: Good  Barriers to Discharge: Wife not home in am; 1 step to enter home.  Evaluation/Treatment Tolerance: Patient tolerated treatment well, Patient limited by fatigue  Medical Staff Made Aware: Yes  End of Session Communication: Bedside nurse  Assessment Comment: Patient presents with overall fatigue limiting tolerance for PT intervention. Mild balance deficits though pt vocalizes that he uses his WW at home. The patient would benefit from participation in PT intervention to achieve safe functional mobility for homegoing (recommending low intensity rehab).  End of Session Patient Position: Bed, 3 rail up, Alarm on  IP OR SWING BED PT PLAN  Inpatient or Swing Bed: Inpatient     04/24/24 1122   PT Plan   Treatment/Interventions Gait training;Bed mobility;Transfer training;Stair training;Balance training;Endurance training;Therapeutic exercise;Therapeutic activity;Home exercise program   PT Plan Skilled PT   PT Frequency 4 times per week   PT Discharge Recommendations Low intensity level of continued care   Equipment Recommended upon Discharge   (Patient states he has a WW at home.)   PT Recommended Transfer Status Assist x1   PT - OK to Discharge Yes  (Once medically cleared.)     Subjective     Current Problem:  Patient Active Problem List   Diagnosis    Amaurosis fugax    Atherosclerosis of aorta (CMS-HCC)    Atrial flutter, paroxysmal (Multi)    Basal cell carcinoma (BCC) of cheek    Acute on chronic respiratory failure with hypoxia and hypercapnia (Multi)    ESRD (end stage renal disease) (Multi)    Chronic maxillary sinusitis    Perennial allergic rhinitis with seasonal variation    Retrosternal  thyroid goiter    Vitamin D deficiency    Stenosis of left external carotid artery    Xerosis of skin    Paralysis of right vocal cord    Hemodialysis patient (CMS-[facility])    Tympanic membrane perforation, right    Goiter with hyperthyroidism    Anemia    Dyslipidemia, goal LDL below 70    Medicare annual wellness visit, subsequent    COPD, severe (Multi)    Secondary hyperparathyroidism of renal origin (Multi)    Pneumonia    Dietary vitamin B12 deficiency anemia    History of cerebrovascular accident    Posttraumatic stress disorder    Cor pulmonale (chronic) (Multi)    COPD with acute exacerbation (Multi)    COPD exacerbation (Multi)    Volume overload    Paroxysmal A-fib (Multi)    Anisocoria    Anemia of chronic renal failure, stage 5 (Multi)       General Visit Information:  General  Reason for Referral: 72 y/o male admitted with dx of acute on chronic hypoxic /hypercarbic respiratory failure, COPD exacerbation, pneumonia, and CHF.  Referred By: Marya Levin MD  Past Medical History Relevant to Rehab: COPD, end-stage renal disease, hypertension, atrial flutter with ablations, CHF, LEO, TIA  Missed Visit: Yes  Missed Visit Reason: Patient in a medical procedure  Family/Caregiver Present: No  Prior to Session Communication: Bedside nurse  Patient Position Received: Bed, 3 rail up, Alarm on  General Comment: pt alert and agreeable to therapy    Home Living:  Home Living  Type of Home: House  Lives With: Spouse  Home Adaptive Equipment: Walker rolling or standard (Per patient this date)  Home Layout: One level  Home Access: Stairs to enter with rails  Entrance Stairs-Rails: Both  Entrance Stairs-Number of Steps: 1 (Per patient report)  Home Living Comments: Spouse works until noon. Home alone until that time.    Prior Level of Function:  Prior Function Per Pt/Caregiver Report  Level of Pershing: Independent with ADLs and functional transfers, Independent with homemaking with ambulation  Receives Help From:  Family  ADL Assistance: Independent  Homemaking Assistance: Needs assistance  Ambulatory Assistance: Independent  Prior Function Comments: Spouse assists with home hemodialysis, meal prep, transportation. PRN assist for IADLs.    Precautions:  Precautions  Precautions Comment: Fall risk. Monitor PSO2 and HR with activity. Cues for pursed lip breathing. Hemoglobin 7.5 - low activity tolerance. Dialysis.    Vital Signs:  Vital Signs  Heart Rate: 103 (After ambulation)  Heart Rate Source: Monitor  SpO2: (!) 89 % (After ambulation. VC for pursed lip breathing with minimal response, increased within 1 minute to 92%.)  Patient Position: Sitting  Objective     Pain:  Pain Assessment  Pain Assessment: 0-10  Pain Score: 0 - No pain    Cognition:  Cognition  Overall Cognitive Status: Within Functional Limits  Orientation Level: Oriented X4    General Assessments:      Activity Tolerance  Endurance: Decreased tolerance for upright activites  Rate of Perceived Exertion (RPE): 4/10 after ambulation.  Sensation  Light Touch: No apparent deficits  Strength  Strength Comments: B LE 4+/5  Perception  Inattention/Neglect: Appears intact  Coordination  Movements are Fluid and Coordinated: Yes     Static Sitting Balance  Static Sitting-Balance Support: No upper extremity supported  Static Sitting-Level of Assistance: Close supervision  Static Sitting-Comment/Number of Minutes: 3  Static Standing Balance  Static Standing-Balance Support: Bilateral upper extremity supported  Static Standing-Level of Assistance: Contact guard    Functional Assessments:     Bed Mobility  Bed Mobility: Yes (Supine <> sit SBA)  Transfers  Transfer: Yes (Sit <> stand SBA.)  Ambulation/Gait Training  Ambulation/Gait Training Performed: Yes (Ambulated 20 feet including turn with WW and CGA, VC for turn toward O2 tubing.)  Stairs  Stairs: No       Extremity/Trunk Assessments:        RLE   RLE : Within Functional Limits  LLE   LLE : Within Functional  Limits    Outcome Measures:  [facility] Basic Mobility  Turning from your back to your side while in a flat bed without using bedrails: None  Moving from lying on your back to sitting on the side of a flat bed without using bedrails: None  Moving to and from bed to chair (including a wheelchair): A little  Standing up from a chair using your arms (e.g. wheelchair or bedside chair): None  To walk in hospital room: A little  Climbing 3-5 steps with railing: Total  Basic Mobility - Total Score: 19                            Goals:  Encounter Problems       Encounter Problems (Active)       To improve endurance, balance for safe return home with family:        Patient will tolerate sitting upright in a chair for 1 hour and complete seated or supine LE exercise x 10 repetitions each  in order to improve overall activity tolerance.       Start:  04/24/24    Expected End:  05/08/24            Patient will complete bed mobility and transfers with MOD I.        Start:  04/24/24    Expected End:  05/08/24            Patient will ambulate 45 feet with WW and SBA, safely managing O2 tubing without cues.        Start:  04/24/24    Expected End:  05/08/24                 Education Documentation  No documentation found.  Education Comments  No comments found.

## 2024-04-24 NOTE — CARE PLAN
The patient's goals for the shift include  to rest comfortably and to maintain safety.    The clinical goals for the shift include SPO2 >88%    Over the shift, the patient progress towards goals.  Patient remained safe and free from falls.

## 2024-04-24 NOTE — PROGRESS NOTES
Renard Ward is a 73 y.o. male on day 3 of admission presenting with COPD exacerbation (Multi).      Subjective   Renard Ward is a 73 y.o. male with PMHx s/f COPD end-stage renal disease hypertension atrial flutter with ablations 2 prior occasions presenting with shortness of breath and chills.  Patient had recently been hospitalized last month and treated for pneumonia COPD exacerbation he progressively had decreased activity tolerance more more short of breath he completed outpatient course of outpatient antibiotics and steroids but continued to get worse.  He also noted that he had decreased urinary output he has continued his home dialysis routine.  He does have chills but denies fevers he is not having much cough or sputum production his respiratory excursion feels tight to him he has had some nausea no abdominal pain no urinary symptoms no diarrhea no constipation no new rashes.  In the emergency department he was afebrile 97.4 heart rate 83 regular respiratory rate 20 blood pressure 129/61 SpO2 87% on 2 L patient had been on is much as 5 L at home.  Blood gas was taken found the patient to be acidotic with elevated pCO2 of 96 pO2 34 subsequently started on BiPAP therapy with improvement in pCO2 and stop and oxygen levels patient now saturating in the low 90s consistently.  Influenza and coronavirus swabs were negative CBC shows mild leukocytosis 11.8 hemoglobin 9.1 hematocrit 30 platelets 196 BN peptide is elevated at 1040 troponin 38 trending down to 33 creatinine 5.87.  Patient had CT of the head no acute changes chest x-ray shows opacification of the right base patchy density in the right lower to mid lung increased from prior exam 5 days prior increased opacification of the left base.  The patient will be admitted for severe shortness of breath hypoxia hypercapnia as he requires BiPAP therapy will obtain nephrology consultation to manage his volume continue treating his respiratory conditions  with IV steroids nebulizer treatments IV antibiotics.      04/22: Patient is feeling better, shortness of breath is improving, weaned off of BiPAP and currently maintaining saturation on 5 L.    04/23: Patient was evaluated this morning, no acute event overnight, shortness of breath is improving.  04/24: Patient was evaluated this morning, overnight patient flipped back to [facility] with heart rate in 80s to 90s.  Shortness of breath is gradually improving, no fever or chills.       Objective     Last Recorded Vitals  BP (!) 119/47 (BP Location: Right arm, Patient Position: Lying)   Pulse 86   Temp 36.7 °C (98.1 °F)   Resp 18   Wt 80.1 kg (176 lb 9.4 oz)   SpO2 95%   Intake/Output last 3 Shifts:    Intake/Output Summary (Last 24 hours) at 4/24/2024 1500  Last data filed at 4/23/2024 2013  Gross per 24 hour   Intake 622 ml   Output 2000 ml   Net -1378 ml       Admission Weight  Weight: 83 kg (182 lb 15.7 oz) (04/21/24 1006)    Daily Weight  04/24/24 : 80.1 kg (176 lb 9.4 oz)    Image Results  Cardiology Interpretation Of Nuclear Stress - See Other Report For Nuclear Portion                [place] [address]       Phone [phone] Fax [phone]    Nuclear Pharmacologic Stress Test    Patient Name:     GENEVA MCGEE   Ordering Provider:    [id number] RA GRANADOS  Study Date:       4/24/2024          Reading Physician:    [id number] Perez Hernandez MD  MRN/PID:          [id number]           Supervising           [id number] Hernandez                                       Physician:            MD  Accession#:       [id number]       Fellow:  Date of           1950 / 73      Fellow:  Birth/Age:        years  Gender:           M                  Nurse:                Nuha Pulliam RN  Admission Status: Inpatient          Sonographer:          NA  Height:           188.00 cm          Technologist:  Weight:            80.10 kg           Additional Staff:     Genet Menendez  BSA:              2.06 m2            Encounter#:           [id number]  BMI:              22.66 kg/m2        Patient [facility]     [facility]    Study Type:    CARDIOLOGY INTERPRETATION OF NUCLEAR STRESS  Diagnosis/ICD: Paroxysmal atrial fibrillation-I48.0; Other forms of                 dyspnea-R06.09  Indication:    Dyspnea    Falls Risk:     Study Details: Correct procedure and correct patient verified verbally and with                 ID Band checked.       Patient History: Atrial fibrillation, chronic obstructive pulmonary disease, cerebrovascular accident, ESRD, Hyperthyroid and vein stripping.  Allergies: PCN.       Medications: Apixaban, Atorvastatin, Carvedilol, Tapazole.     Patient Performance: Patient received a total of 0.4 mg of Regadenoson at 10:18:11 AM. The resting blood pressure was 121/50 mmHg with a heart rate of 84 bpm. The patient developed no symptoms during the stress exam. The blood pressure response was normal. The test was terminated due to: end of vasodilator infusion. Patient has met the discharge criteria and is discharged to their floor.     70% maximum predicted heart rate (MPHR) is 102 bpm.  85% maximum predicted heart rate (MPHR) is 123 bpm.  100% maximum predicted heart rate (MPHR) is 145 bpm.       Baseline ECG: Resting ECG showed Atrial Fibrillation with rightward axis. Pulse ox 98% on 6L.     Stress ECG: Stress ECG showed atrial fibrillation. No ST changes.     Stress Stage Data:  +----------------+---+------+-------+------------------------------------------+                  HR Sys BPDias BPComments                                    +----------------+---+------+-------+------------------------------------------+  Baseline Kwopchn95 121   50                                                 +----------------+---+------+-------+------------------------------------------+  Stage I         84 UTO           Pt. states chest pressure remains 6/10 as                                   it was prior                                +----------------+---+------+-------+------------------------------------------+  Stage II        [id number]   47                                                 +----------------+---+------+-------+------------------------------------------+  Stage III       [id number]   49                                                 +----------------+---+------+-------+------------------------------------------+  Stage IV        [id number]   46                                                 +----------------+---+------+-------+------------------------------------------+  Stage V         [id number]   50                                                 +----------------+---+------+-------+------------------------------------------+       Summary:   1. No clinical or electrocardiographic evidence for ischemia at maximal workload.   2. Correlate with myocardial perfusion imaging results.   3. Nuclear image results are reported separately.    [id number] Perez Hernandez MD  Electronically signed on 4/24/2024 at 12:24:07 PM            ** Final **  Nuclear Stress Test  Narrative: Interpreted By:  Mynor Romano,   STUDY:  NUCLEAR STRESS TEST;  4/24/2024 11:33 am      INDICATION:  Signs/Symptoms:shortness of breath.      COMPARISON:  None.      ACCESSION NUMBER(S):  [id number]      ORDERING CLINICIAN:  RA GRANADOS      TECHNIQUE:  DIVISION OF NUCLEAR MEDICINE  PHARMACOLOGIC STRESS MYOCARDIAL PERFUSION SCAN, ONE DAY PROTOCOL      The patient received an intravenous dose of  11.1 mCi of Tc-99m  tetrofosmin and resting emission tomographic (SPECT) images of the  myocardium were acquired. The patient then received an intravenous  infusion of 0.4mg regadenoson (Lexiscan) followed by an additional  dose of  34.7 mCi of Tc-99m tetrofosmin. Stress phase SPECT images of  the myocardium were then acquired. These  included ECG-gated images to  assess and quantify ventricular function.      FINDINGS:  There is a small, mild intensity, partially reversible perfusion  defect in the apical and apical inferior walls concerning for  ischemia. Low risk sum difference score of 1.      ECG-gated images demonstrate normal LV size and myocardial  contractility with an LV ejection fraction of   69 % (normal above 50  percent).      Impression: 1. There is a small, mild intensity, partially reversible perfusion  defect in the apical and apical inferior walls concerning for  ischemia. Low risk sum difference score of 1.  2. Calculated ejection fraction of 69% without segmental wall motion  abnormality seen.              Signed by: Mynor Romano 4/24/2024 12:12 PM  Dictation workstation:   [id number]  Electrocardiogram, 12-lead PRN ACS symptoms  Atrial fibrillation with premature ventricular or aberrantly conducted complexes  Abnormal ECG  When compared with ECG of 21-APR-2024 13:19,  PREVIOUS ECG IS PRESENT      Physical Exam  Patient is awake and orient, not in apparent distress  Eyes: PERRLA, no conjunctival congestion  Chest: Bilateral decreased air entry, bibasilar crackles, expiratory wheezing.  Heart: s1S2 regular, no murmur  Abdomen: Soft, non tender, BS present  Ext:    Relevant Results               Assessment/Plan      1.  Acute on chronic hypoxic/hypercarbic respiratory failure  Continue oxygen supplementation and wean as tolerated, patient was started on BiPAP and gradually being weaned off and currently maintaining saturation at 5L.  Baseline oxygen requirement is 5 L    2.  COPD exacerbation  Will switch steroid to oral prednisone and tapering dose and antibiotics to IV ceftriaxone.  Continue on doxycycline, DuoNeb treatment  Monitor    3.  Pneumonia, presumably gram-negative  Started on IV cefepime as patient is allergic to penicillin, vancomycin and Doxy.  Patient condition gradually improving, vancomycin was discontinued as  per policy.  Will switch cefepime to IV Rocephin and continue on doxycycline.  Monitor  Monitor    4.  Acute on chronic diastolic congestive heart failure  Cardiology on board  Dialysis for fluid management    5.  ESRD  Nephrology on board for dialysis  Patient is receiving daily ultrafiltration    6.  Acute pulmonary edema secondary to fluid overload  On dialysis for fluid management    7.  Paroxysmal A-fib/flutter  Eliquis on hold for anticipated heart cath on Friday    8.  Abnormal stress test  Plan for Left heart cath on Friday               Marya Levin MD

## 2024-04-24 NOTE — PROGRESS NOTES
Patient having stress test today, pending results and need possible DC today vs tomorrow per Dr Levin, patient has been recommend for Home Care. Will print list for patient preference. IMM completed.

## 2024-04-25 ENCOUNTER — APPOINTMENT (OUTPATIENT)
Dept: DIALYSIS | Facility: HOSPITAL | Age: 74
End: 2024-04-25
Payer: MEDICARE

## 2024-04-25 PROBLEM — R94.39 ABNORMAL STRESS TEST: Status: ACTIVE | Noted: 2024-04-21

## 2024-04-25 LAB
ATRIAL RATE: 96 BPM
Q ONSET: 215 MS
QRS COUNT: 14 BEATS
QRS DURATION: 104 MS
QT INTERVAL: 356 MS
QTC CALCULATION(BAZETT): 435 MS
QTC FREDERICIA: 407 MS
R AXIS: 84 DEGREES
T AXIS: 12 DEGREES
T OFFSET: 393 MS
VENTRICULAR RATE: 90 BPM

## 2024-04-25 PROCEDURE — 97116 GAIT TRAINING THERAPY: CPT | Mod: GP,CQ | Performed by: PHYSICAL THERAPY ASSISTANT

## 2024-04-25 PROCEDURE — 2060000001 HC INTERMEDIATE ICU ROOM DAILY

## 2024-04-25 PROCEDURE — 2500000004 HC RX 250 GENERAL PHARMACY W/ HCPCS (ALT 636 FOR OP/ED): Performed by: NURSE PRACTITIONER

## 2024-04-25 PROCEDURE — 8010000001 HC DIALYSIS - HEMODIALYSIS PER DAY

## 2024-04-25 PROCEDURE — 2500000001 HC RX 250 WO HCPCS SELF ADMINISTERED DRUGS (ALT 637 FOR MEDICARE OP): Performed by: NURSE PRACTITIONER

## 2024-04-25 PROCEDURE — 2500000004 HC RX 250 GENERAL PHARMACY W/ HCPCS (ALT 636 FOR OP/ED): Performed by: INTERNAL MEDICINE

## 2024-04-25 PROCEDURE — 99232 SBSQ HOSP IP/OBS MODERATE 35: CPT | Performed by: NURSE PRACTITIONER

## 2024-04-25 PROCEDURE — 2500000001 HC RX 250 WO HCPCS SELF ADMINISTERED DRUGS (ALT 637 FOR MEDICARE OP): Performed by: INTERNAL MEDICINE

## 2024-04-25 PROCEDURE — 97112 NEUROMUSCULAR REEDUCATION: CPT | Mod: GP,CQ | Performed by: PHYSICAL THERAPY ASSISTANT

## 2024-04-25 PROCEDURE — 99233 SBSQ HOSP IP/OBS HIGH 50: CPT | Performed by: INTERNAL MEDICINE

## 2024-04-25 RX ADMIN — PREDNISONE 40 MG: 20 TABLET ORAL at 11:46

## 2024-04-25 RX ADMIN — METHIMAZOLE 5 MG: 5 TABLET ORAL at 11:46

## 2024-04-25 RX ADMIN — FLUTICASONE FUROATE AND VILANTEROL TRIFENATATE 1 PUFF: 100; 25 POWDER RESPIRATORY (INHALATION) at 08:00

## 2024-04-25 RX ADMIN — DOXYCYCLINE 100 MG: 100 INJECTION, POWDER, LYOPHILIZED, FOR SOLUTION INTRAVENOUS at 04:00

## 2024-04-25 RX ADMIN — CARVEDILOL 6.25 MG: 6.25 TABLET, FILM COATED ORAL at 17:13

## 2024-04-25 RX ADMIN — HEPARIN SODIUM 1000 UNITS: 1000 INJECTION INTRAVENOUS; SUBCUTANEOUS at 11:19

## 2024-04-25 RX ADMIN — Medication 1 CAPSULE: at 11:46

## 2024-04-25 RX ADMIN — HEPARIN SODIUM 1000 UNITS: 1000 INJECTION INTRAVENOUS; SUBCUTANEOUS at 11:20

## 2024-04-25 RX ADMIN — TIOTROPIUM BROMIDE INHALATION SPRAY 2 PUFF: 3.12 SPRAY, METERED RESPIRATORY (INHALATION) at 08:01

## 2024-04-25 RX ADMIN — CARVEDILOL 6.25 MG: 6.25 TABLET, FILM COATED ORAL at 11:46

## 2024-04-25 RX ADMIN — CEFTRIAXONE SODIUM 1 G: 1 INJECTION, SOLUTION INTRAVENOUS at 17:13

## 2024-04-25 RX ADMIN — MONTELUKAST 10 MG: 10 TABLET, FILM COATED ORAL at 21:39

## 2024-04-25 RX ADMIN — ATORVASTATIN CALCIUM 40 MG: 40 TABLET, FILM COATED ORAL at 21:39

## 2024-04-25 ASSESSMENT — COGNITIVE AND FUNCTIONAL STATUS - GENERAL
TURNING FROM BACK TO SIDE WHILE IN FLAT BAD: A LITTLE
MOBILITY SCORE: 17
MOBILITY SCORE: 17
CLIMB 3 TO 5 STEPS WITH RAILING: TOTAL
PERSONAL GROOMING: A LITTLE
TOILETING: A LOT
STANDING UP FROM CHAIR USING ARMS: A LITTLE
DAILY ACTIVITIY SCORE: 15
CLIMB 3 TO 5 STEPS WITH RAILING: TOTAL
HELP NEEDED FOR BATHING: A LOT
TURNING FROM BACK TO SIDE WHILE IN FLAT BAD: A LITTLE
MOVING TO AND FROM BED TO CHAIR: A LITTLE
STANDING UP FROM CHAIR USING ARMS: A LITTLE
WALKING IN HOSPITAL ROOM: A LITTLE
WALKING IN HOSPITAL ROOM: A LITTLE
DRESSING REGULAR LOWER BODY CLOTHING: A LOT
MOVING TO AND FROM BED TO CHAIR: A LITTLE
DRESSING REGULAR UPPER BODY CLOTHING: A LOT

## 2024-04-25 ASSESSMENT — PAIN SCALES - GENERAL
PAINLEVEL_OUTOF10: 0 - NO PAIN

## 2024-04-25 ASSESSMENT — PAIN - FUNCTIONAL ASSESSMENT
PAIN_FUNCTIONAL_ASSESSMENT: 0-10
PAIN_FUNCTIONAL_ASSESSMENT: NO/DENIES PAIN

## 2024-04-25 NOTE — PROGRESS NOTES
Renard Ward is a 73 y.o. male on day 4 of admission presenting with COPD exacerbation (Multi).      Subjective   Renard Ward is a 73 y.o. male with PMHx s/f COPD end-stage renal disease hypertension atrial flutter with ablations 2 prior occasions presenting with shortness of breath and chills.  Patient had recently been hospitalized last month and treated for pneumonia COPD exacerbation he progressively had decreased activity tolerance more more short of breath he completed outpatient course of outpatient antibiotics and steroids but continued to get worse.  He also noted that he had decreased urinary output he has continued his home dialysis routine.  He does have chills but denies fevers he is not having much cough or sputum production his respiratory excursion feels tight to him he has had some nausea no abdominal pain no urinary symptoms no diarrhea no constipation no new rashes.  In the emergency department he was afebrile 97.4 heart rate 83 regular respiratory rate 20 blood pressure 129/61 SpO2 87% on 2 L patient had been on is much as 5 L at home.  Blood gas was taken found the patient to be acidotic with elevated pCO2 of 96 pO2 34 subsequently started on BiPAP therapy with improvement in pCO2 and stop and oxygen levels patient now saturating in the low 90s consistently.  Influenza and coronavirus swabs were negative CBC shows mild leukocytosis 11.8 hemoglobin 9.1 hematocrit 30 platelets 196 BN peptide is elevated at 1040 troponin 38 trending down to 33 creatinine 5.87.  Patient had CT of the head no acute changes chest x-ray shows opacification of the right base patchy density in the right lower to mid lung increased from prior exam 5 days prior increased opacification of the left base.  The patient will be admitted for severe shortness of breath hypoxia hypercapnia as he requires BiPAP therapy will obtain nephrology consultation to manage his volume continue treating his respiratory conditions  with IV steroids nebulizer treatments IV antibiotics.      04/22: Patient is feeling better, shortness of breath is improving, weaned off of BiPAP and currently maintaining saturation on 5 L.    04/23: Patient was evaluated this morning, no acute event overnight, shortness of breath is improving.  04/24: Patient was evaluated this morning, overnight patient flipped back to A[facility] with heart rate in 80s to 90s.  Shortness of breath is gradually improving, no fever or chills.    4/25: He is without complaint. [facility] with coronary angiogram planned for tomorrow morning.               Objective     Last Recorded Vitals  /63 (BP Location: Right arm, Patient Position: Lying)   Pulse 77   Temp 36.5 °C (97.7 °F) (Temporal)   Resp 17   Wt 77.5 kg (170 lb 13.7 oz)   SpO2 98%   Intake/Output last 3 Shifts:    Intake/Output Summary (Last 24 hours) at 4/25/2024 1135  Last data filed at 4/25/2024 1133  Gross per 24 hour   Intake 2000 ml   Output 6300 ml   Net -4300 ml       Admission Weight  Weight: 83 kg (182 lb 15.7 oz) (04/21/24 1006)    Daily Weight  04/25/24 : 77.5 kg (170 lb 13.7 oz)    Image Results  Electrocardiogram, 12-lead PRN ACS symptoms  Atrial fibrillation with premature ventricular or aberrantly conducted complexes  Abnormal ECG  When compared with ECG of 21-APR-2024 13:19,  PREVIOUS ECG IS PRESENT  Confirmed by Mynor Romano ([id number]) on 4/25/2024 8:11:42 AM      Physical Exam  Patient is awake and orient, not in apparent distress  Eyes: PERRLA, no conjunctival congestion  Chest: Bilateral decreased air entry, bibasilar crackles, expiratory wheezing.  Heart: s1S2 regular, no murmur  Abdomen: Soft, non tender, BS present  Ext:    Relevant Results               Assessment/Plan      1.  Acute on chronic hypoxic/hypercarbic respiratory failure  Continue oxygen supplementation and wean as tolerated, patient was started on BiPAP and gradually being weaned off and currently maintaining saturation at 5L.  Baseline  oxygen requirement is 5 L    2.  COPD exacerbation  -continue steroids  -doxycycline complete    3.  Pneumonia, presumably gram-negative  -Now on ceftriaxone alone, D5 of treatment   -Doxycycline complete    4.  Acute on chronic diastolic congestive heart failure  Cardiology on board  Dialysis for fluid management    5.  ESRD  Nephrology on board for dialysis  Patient is receiving daily ultrafiltration    6.  Acute pulmonary edema secondary to fluid overload  On dialysis for fluid management    7.  Paroxysmal A-fib/flutter  Eliquis on hold for anticipated heart cath on Friday    8.  Abnormal stress test  Plan for Left heart cath on Friday               Virgilio Watson MD PhD

## 2024-04-25 NOTE — PROGRESS NOTES
Met with patient at bedside along with spouse. TCC discussed [facility] and [facility]. Patient and spouse are agreeable. Will alert Dr Watson to place referrals on discharge. Patient is to have heart cath tomorrow, another HD session, likely DC Saturday.

## 2024-04-25 NOTE — PROGRESS NOTES
"Subjective Data:  Mr. Ward is resting in bed, no acute distress. Remains short of breath at rest and with exertion. He denies chest pain or pressure but continues to feel \"fluttering in my chest at times\". Monitor demonstrates SR with rates in the 80s.     Today, Mr. Ward is sitting in bed getting HD. He denies chest pain or pressure, reports he feels occasional flutter sensation in his chest, shortness of breath has improved, no dizziness or lightheadedness with ambulation today. Monitor demonstrates Afib with rates in the 80-90s this afternoon.     4/25/24:  Patient seen while in HD.  Denies any chest pain or SOB.  He verbalized understanding of plans for LHC tomorrow.  AF on telemetry with HR 70's.    Overnight Events:    Afib     Objective Data:  Last Recorded Vitals:  Vitals:    04/25/24 0427 04/25/24 0746 04/25/24 0813 04/25/24 0928   BP: 111/61  114/63    BP Location: Right arm  Right arm    Patient Position: Lying  Lying    Pulse: 75  75 75   Resp: 16  17    Temp: 36.3 °C (97.4 °F)  36.7 °C (98 °F) 36.6 °C (97.8 °F)   TempSrc: Temporal  Temporal Temporal   SpO2: 100% 97% 98%    Weight: 77.5 kg (170 lb 13.7 oz)      Height:    1.88 m (6' 2.02\")       Last Labs:  Results from last 7 days   Lab Units 04/24/24 0433 04/23/24 0429 04/22/24  0343   WBC AUTO x10*3/uL 14.1* 12.3* 6.6   HEMOGLOBIN g/dL 7.5* 7.4* 7.6*   HEMATOCRIT % 25.6* 25.2* 25.4*   PLATELETS AUTO x10*3/uL 176 181 153      Results from last 7 days   Lab Units 04/24/24  0433 04/23/24  0429 04/22/24  0343 04/21/24  1023   SODIUM mmol/L 136 139 134* 135*   POTASSIUM mmol/L 4.4 4.3 4.2 4.8   CHLORIDE mmol/L 101 102 98 98   CO2 mmol/L 27 33* 29 31   BUN mg/dL 61* 30* 34* 42*   CREATININE mg/dL 5.34* 3.53* 4.35* 5.87*   CALCIUM mg/dL 8.6 8.5* 8.4* 8.8   PROTEIN TOTAL g/dL  --   --   --  6.3*   BILIRUBIN TOTAL mg/dL  --   --   --  0.4   ALK PHOS U/L  --   --   --  59   ALT U/L  --   --   --  14   AST U/L  --   --   --  12   GLUCOSE mg/dL 115* 134* " "191* 112*      Results from last 7 days   Lab Units 04/24/24  0433 04/23/24  0429 04/22/24  0343   MAGNESIUM mg/dL 1.70 1.76 1.54*     PTT - No results in last year.  1.5   16.9 _     TROPHS   Date/Time Value Ref Range Status   04/21/2024 11:11 AM 33 0 - 20 ng/L Final   04/21/2024 10:23 AM 38 0 - 20 ng/L Final   03/05/2024 11:03 AM 20 0 - 20 ng/L Final     BNP   Date/Time Value Ref Range Status   04/21/2024 10:23 AM 1,040 0 - 99 pg/mL Final   03/05/2024 10:20  0 - 99 pg/mL Final     HGBA1C   Date/Time Value Ref Range Status   12/10/2019 08:46 AM 6.1 % Final     Comment:          Diagnosis of Diabetes-Adults   Non-Diabetic: < or = 5.6%   Increased risk for developing diabetes: 5.7-6.4%   Diagnostic of diabetes: > or = 6.5%  .       Monitoring of Diabetes                Age (y)     Therapeutic Goal (%)   Adults:          >18           <7.0   Pediatrics:    13-18           <7.5                   7-12           <8.0                   0- 6            7.5-8.5   American Diabetes Association. Diabetes Care 33(S1), Jan 2010.       VLDL   Date/Time Value Ref Range Status   04/14/2023 08:08 AM 32 0 - 40 mg/dL Final   04/11/2022 08:48 AM 39 0 - 40 mg/dL Final   05/25/2021 11:09 AM 23 0 - 40 mg/dL Final      Last I/O:  I/O last 3 completed shifts:  In: 822 (10.6 mL/kg) [P.O.:222; Other:400; IV Piggyback:200]  Out: 2400 (31 mL/kg) [Other:2400]  Weight: 77.5 kg     Past Cardiology Tests     Echo:  Transthoracic Echo (TTE) Complete 04/22/2024  Left ventricular systolic function is normal with a 60-65% estimated ejection fraction.   2. There is a moderate pleural effusion.   3. Mild mitral valve regurgitation.   4. Mild tricuspid regurgitation is visualized.   5. Mildly elevated RVSP.  Ejection Fractions:  No results found for: \"EF\"  Cath:  No results found for this or any previous visit from the past 1095 days.    Stress Test:  NM Stress test 4/24/24:  1. There is a small, mild intensity, partially reversible " perfusion  defect in the apical and apical inferior walls concerning for  ischemia. Low risk sum difference score of 1.  2. Calculated ejection fraction of 69% without segmental wall motion  abnormality seen.    Cardiac Imaging:  No results found for this or any previous visit from the past 1095 days.      Inpatient Medications:  Scheduled medications   Medication Dose Route Frequency    [Held by provider] apixaban  5 mg oral BID    atorvastatin  40 mg oral Nightly    B complex-vitamin C-folic acid  1 capsule oral Daily    carvedilol  6.25 mg oral BID with meals    cefTRIAXone  1 g intravenous q24h    doxycycline  100 mg intravenous q12h    epoetin jennifer or biosimilar  5,000 Units subcutaneous Once per day on Monday Wednesday Friday    tiotropium  2 puff inhalation Daily    And    fluticasone furoate-vilanteroL  1 puff inhalation Daily    heparin  1,000 Units intra-catheter After Dialysis    heparin  1,000 Units intra-catheter After Dialysis    LORazepam  1 mg intravenous Once    methIMAzole  5 mg oral Daily    montelukast  10 mg oral Nightly    perflutren protein A microsphere  0.5 mL intravenous Once in imaging    predniSONE  40 mg oral Daily    Followed by    [START ON 4/28/2024] predniSONE  20 mg oral Daily    Followed by    [START ON 5/1/2024] predniSONE  10 mg oral Daily    Followed by    [START ON 5/4/2024] predniSONE  5 mg oral Daily    sodium chloride  200 mL intravenous After Dialysis     PRN medications   Medication    ipratropium-albuteroL    oxygen     Continuous Medications   Medication Dose Last Rate       Physical Exam:  Physical Exam  Vitals reviewed.   Constitutional:       Appearance: Normal appearance.   HENT:      Head: Normocephalic.      Mouth/Throat:      Mouth: Mucous membranes are moist.   Cardiovascular:      Rate and Rhythm: Normal rate and regular rhythm.      Pulses: Normal pulses.      Heart sounds: Normal heart sounds.   Pulmonary:      Effort: Pulmonary effort is normal.      Breath  sounds: No wheezing, rhonchi or rales.   Abdominal:      General: Bowel sounds are normal. There is no distension.      Palpations: Abdomen is soft.      Tenderness: There is no abdominal tenderness.   Musculoskeletal:      Cervical back: Normal range of motion.      Right lower leg: No edema.      Left lower leg: No edema.   Skin:     General: Skin is warm and dry.   Neurological:      General: No focal deficit present.      Mental Status: He is alert and oriented to person, place, and time.   Psychiatric:         Mood and Affect: Mood normal.         Behavior: Behavior normal.           Assessment/Plan   1-acute hypoxic and hypercarbic respiratory failure, abnormal stress test-appears mostly to be heart failure although patient does have history of aspiration pneumonia and possibility needs to be ruled out.  We will check an echocardiogram and if LV function is preserved arrange for ischemic evaluation and continue to monitor on telemetry for A-fib burden to rule out any reversible cardiovascular cause of heart failure.  He will likely need to increase dialysis volume outpatient and I would leave it to the expert hands of his nephrologist.     Discussed with Dr. Levin.    4/23/24: Patient remains short of breath this morning despite 2 sessions of HD with 2L removed with each session. Echo with normal LV systolic function with EF 60-65%. Patient agreeable to proceed with stress test, will order.     4/24/24: Stress test with small, mild intensity, partially reversible perfusion defect in the apical and apical inferior walls concerning for ischemia. Left heart catheterization procedure recommended, discussed results with patient and Dr. Feng, patient is agreeable to proceed with procedure. He had last dose of Apixaban today, will have to plan for procedure to be on Friday 4/26.    4/25/24: Denies any chest pain/pressure or SOB.  Seen while in HD.  Patient aware of plans for LHC tomorrow with Dr. Todd  Mary.      2-history of atrial flutter-seems to be in sinus rhythm but will monitor on telemetry as he is stating he is getting palpitations often.  Depending on burden of atrial arrhythmia if any may need another ablation or antiarrhythmic.  Unfortunately in the past though amiodarone did not work very well.  Continue anticoagulation.    4/23/24: Continues to feel palpitations at times, maintaining SR with rates in the 80s today. Continue oral anticoagulation with apixaban, no abnormal bleeding reported. Recommend 14 day Holter monitor at discharge to evaluate burden of Afib.    4/24/24: Patient noted to be back in Afib starting last evening, rates controlled in the 80-90s, continues to have occasional fluttering but reports not significant at this time. Will need to hold Apixaban for LHC procedure on Friday. Continue on carvedilol 6.25 mg BID.    4/25/24:  Apixaban remains on hold d/t plans for LHC tomorrow.  Continue carvedilol 6.25 mg BID.    3. ESRD on HD Mon-Wed-Fri  Management per nephrology        Code Status:  Full Code      Janelle Curtis, APRN-CNP

## 2024-04-25 NOTE — POST-PROCEDURE NOTE
Report to Receiving RN:    Report To: LIZETT LAGUERRE  Time Report Called: 1126  Hand-Off Communication: TOLERATED TX WELL,LAST BP AND PULSE, PT STABLE  Complications During Treatment: No  Ultrafiltration Treatment: Yes  Medications Administered During Dialysis: No  Blood Products Administered During Dialysis: No  Labs Sent During Dialysis: No  Heparin Drip Rate Changes: No  Dialysis Catheter Dressing: CLEAN AND DRY  Last Dressing Change: 4/24/24    Electronic Signatures:  KANDACE OCDT    Last Updated: 11:34 AM by ADIS CAMPOS

## 2024-04-25 NOTE — PRE-PROCEDURE NOTE
Report from Sending RN:    Report From: malcolm jones   Recent Surgery of Procedure: No  Baseline Level of Consciousness (LOC): x4  Oxygen Use: Yes  Type: nc  Diabetic: No  Last BP Med Given Day of Dialysis: see emar   Last Pain Med Given: see emar   Lab Tests to be Obtained with Dialysis: No  Blood Transfusion to be Given During Dialysis: No  Available IV Access: Yes  Medications to be Administered During Dialysis: No  Continuous IV Infusion Running: No  Restraints on Currently or in the Last 24 Hours: N/A  Hand-Off Communication: stable for hd   Dialysis Catheter Dressing: clean dry intact   Last Dressing Change:

## 2024-04-25 NOTE — PROGRESS NOTES
Reason For Consult   Esrd     Interval History    Patient is doing   better seen on IHD today tolerating treatment well and goal is to further remove volume.     ROS  Denies chest pain, shortness of breath,  Nausea, vomiting , diarrhea, fever or chills.     No change in Past Medical History            Review of systems    A complete review of systems was performed and is negative other than as  already described in the history of presenting illness   Respiratory:  Positive for cough, shortness of breath and wheezing.    Physical Exam  ysical Exam  Constitutional:       Appearance: Normal appearance. He is not toxic-appearing.   HENT:      Head: Normocephalic and atraumatic.      Right Ear: External ear normal.      Mouth/Throat:      Mouth: Mucous membranes are moist.      Pharynx: Oropharynx is clear.   Eyes:      General: No scleral icterus.     Conjunctiva/sclera: Conjunctivae normal.      Pupils: Pupils are equal, round, and reactive to light.     Cardiovascular:      Rate and Rhythm: Normal rate and regular rhythm.      Pulses: Normal pulses.      Heart sounds: Normal heart sounds.   Pulmonary:      Effort: Pulmonary effort is normal.      Breath sounds:  bilateral  rales present.    Abdominal:      Palpations: Abdomen is soft.      Tenderness: There is no abdominal tenderness.   Musculoskeletal:      Trace edema and some visible in bilateral upper and lower extremities  Neurological:      General: No focal deficit present.      Mental Status: He is alert and oriented to person, place, and time. Mental status is at baseline.   Psychiatric:         Mood and Affect: Mood normal.         Behavior: Behavior normal.         Thought Content: Thought content normal.         Judgment: Judgment normal.          I&O 24HR    Intake/Output Summary (Last 24 hours) at 4/24/2024 2051  Last data filed at 4/24/2024 2049  Gross per 24 hour   Intake 450 ml   Output 2400 ml   Net -1950 ml       Vitals 24HR  Heart Rate:  []    Temp:  [36.1 °C (97 °F)-36.7 °C (98.1 °F)]   Resp:  [18]   BP: (119-144)/(47-66)   Weight:  [80.1 kg (176 lb 9.4 oz)]   SpO2:  [89 %-98 %]              Assessment/Plan       End-stage renal disease N18.6  Hypertension I12  Dependence on hemodialysis Z99.2  Acute on chronic hypoxemic  respiratory failure J96.21, J96.22  Atrial fibrillation chronic  Anemia of chronic kidney disease     RECOMMENDATIONS:    Will perform dialysis per Monday Wednesday Friday schedule while inpatient, if patient is discharged we will resume 4 days a week outpatient home hemodialysis.  Plan for ultrafiltration session on April 21 patient was encouraged to remove more volume on his hemodialysis treatments at home as he only limits themselves to less than 1 L on typical treatments  Defer antibiotic therapy to primary service.  Acute respiratory failure is responding to volume management.  Patient appears to be volume overloaded still need to try to get patient with to be below 79 kg's ideally.  During previous admission had got him down to 77 kg.  Hypertension is well-controlled.,  Hemoglobin is quite low at this point resume erythropoietin stimulating agents also there is concern that patient may have dilutional anemia.    Patient remains on Coreg and presently getting cefepime and doxycycline.  Renal multivitamin initiated              Jean-Paul Duffy MD

## 2024-04-25 NOTE — PROGRESS NOTES
Occupational Therapy                 Therapy Communication Note    Patient Name: Renard Ward  MRN: [id number]  Today's Date: 4/25/2024     Discipline: Occupational Therapy    Missed Visit Reason: Missed Visit Reason: Patient in a medical procedure (attempted OT tx session at this time however pt OOR for medical procedure.)    Missed Time: Attempt    Comment:

## 2024-04-25 NOTE — PROGRESS NOTES
Occupational Therapy                 Therapy Communication Note    Patient Name: Renard Ward  MRN: [id number]  Today's Date: 4/25/2024     Discipline: Occupational Therapy    Missed Visit Reason: Missed Visit Reason: Patient refused (Attempted OT tx session at this time, however pt declined participating in session.)    Missed Time: Attempt    Comment:

## 2024-04-25 NOTE — PROGRESS NOTES
Reason For Consult   Esrd     Interval History    Patient is doing   better seen on IHD today getting ultrafiltration only tolerating treatment well and goal is to further remove volume.     ROS  Denies chest pain, shortness of breath,  Nausea, vomiting , diarrhea, fever or chills.     No change in Past Medical History        Review of systems    A complete review of systems was performed and is negative other than as  already described in the history of presenting illness   Respiratory:  Positive for cough, shortness of breath and wheezing.    Physical Exam  ysical Exam  Constitutional:       Appearance: Normal appearance. He is not toxic-appearing.   HENT:      Head: Normocephalic and atraumatic.      Right Ear: External ear normal.      Mouth/Throat:      Mouth: Mucous membranes are moist.      Pharynx: Oropharynx is clear.   Eyes:      General: No scleral icterus.     Conjunctiva/sclera: Conjunctivae normal.      Pupils: Pupils are equal, round, and reactive to light.     Cardiovascular:      Rate and Rhythm: Normal rate and regular rhythm.      Pulses: Normal pulses.      Heart sounds: Normal heart sounds.   Pulmonary:      Effort: Pulmonary effort is normal.      Breath sounds: Amount of crepitations is decreasing at this point  Abdominal:      Palpations: Abdomen is soft.      Tenderness: There is no abdominal tenderness.   Musculoskeletal:      Trace edema and some visible in bilateral upper and lower extremities  Neurological:      General: No focal deficit present.      Mental Status: He is alert and oriented to person, place, and time. Mental status is at baseline.   Psychiatric:         Mood and Affect: Mood normal.         Behavior: Behavior normal.         Thought Content: Thought content normal.         Judgment: Judgment normal.          I&O 24HR    Intake/Output Summary (Last 24 hours) at 4/25/2024 1033  Last data filed at 4/25/2024 1000  Gross per 24 hour   Intake 1200 ml   Output 2400 ml   Net  "-1200 ml       Vitals 24HR  Heart Rate:  []   Temp:  [36.3 °C (97.4 °F)-36.7 °C (98.1 °F)]   Resp:  [16-18]   BP: (111-144)/(47-64)   Height:  [188 cm (6' 2.02\")]   Weight:  [77.5 kg (170 lb 13.7 oz)]   SpO2:  [89 %-100 %]     Results from last 7 days   Lab Units 04/24/24 0433 04/23/24 0429 04/22/24  0343   SODIUM mmol/L 136 139 134*   POTASSIUM mmol/L 4.4 4.3 4.2   CHLORIDE mmol/L 101 102 98   CO2 mmol/L 27 33* 29   BUN mg/dL 61* 30* 34*   CREATININE mg/dL 5.34* 3.53* 4.35*   EGFR mL/min/1.73m*2 11* 17* 14*   GLUCOSE mg/dL 115* 134* 191*   CALCIUM mg/dL 8.6 8.5* 8.4*     Results from last 7 days   Lab Units 04/24/24 0433 04/23/24 0429 04/22/24  0343   WBC AUTO x10*3/uL 14.1* 12.3* 6.6   HEMOGLOBIN g/dL 7.5* 7.4* 7.6*   HEMATOCRIT % 25.6* 25.2* 25.4*   PLATELETS AUTO x10*3/uL 176 181 153              Assessment/Plan       End-stage renal disease N18.6  Hypertension I12  Dependence on hemodialysis Z99.2  Acute on chronic hypoxemic  respiratory failure J96.21, J96.22  Atrial fibrillation chronic  Anemia of chronic kidney disease     RECOMMENDATIONS:    Will perform dialysis per Monday Wednesday Friday schedule while inpatient, if patient is discharged we will resume 4 days a week outpatient home hemodialysis.  Defer antibiotic therapy to primary service.  Acute respiratory failure is responding to volume management.  Remains in respiratory failure at baseline patient is able to be weaned off oxygen completely  Established patient's dry weight, check floor scale weight after dialysis on April 25 discussed with bedside dialysis nurse  Will order a dose of intravenous iron with dialysis on April 26  Likely dry weight is between 77 to 78 kg's  Hypertension is well-controlled.,  Hemoglobin is quite low at this point resume erythropoietin stimulating agents   Patient remains on Coreg and presently getting Rocephin   Renal multivitamin initiated    Jean-Paul Duffy MD    "

## 2024-04-26 ENCOUNTER — APPOINTMENT (OUTPATIENT)
Dept: DIALYSIS | Facility: HOSPITAL | Age: 74
End: 2024-04-26
Payer: MEDICARE

## 2024-04-26 LAB
ANION GAP SERPL CALC-SCNC: 15 MMOL/L (ref 10–20)
BACTERIA BLD CULT: NORMAL
BACTERIA BLD CULT: NORMAL
BUN SERPL-MCNC: 85 MG/DL (ref 6–23)
CALCIUM SERPL-MCNC: 8.6 MG/DL (ref 8.6–10.3)
CHLORIDE SERPL-SCNC: 101 MMOL/L (ref 98–107)
CO2 SERPL-SCNC: 25 MMOL/L (ref 21–32)
CREAT SERPL-MCNC: 5.95 MG/DL (ref 0.5–1.3)
EGFRCR SERPLBLD CKD-EPI 2021: 9 ML/MIN/1.73M*2
ERYTHROCYTE [DISTWIDTH] IN BLOOD BY AUTOMATED COUNT: 14.9 % (ref 11.5–14.5)
GLUCOSE SERPL-MCNC: 98 MG/DL (ref 74–99)
HCT VFR BLD AUTO: 27.7 % (ref 41–52)
HGB BLD-MCNC: 8.5 G/DL (ref 13.5–17.5)
MAGNESIUM SERPL-MCNC: 1.86 MG/DL (ref 1.6–2.4)
MCH RBC QN AUTO: 30.2 PG (ref 26–34)
MCHC RBC AUTO-ENTMCNC: 30.7 G/DL (ref 32–36)
MCV RBC AUTO: 99 FL (ref 80–100)
NRBC BLD-RTO: 0 /100 WBCS (ref 0–0)
PLATELET # BLD AUTO: 163 X10*3/UL (ref 150–450)
POTASSIUM SERPL-SCNC: 5.2 MMOL/L (ref 3.5–5.3)
RBC # BLD AUTO: 2.81 X10*6/UL (ref 4.5–5.9)
SODIUM SERPL-SCNC: 136 MMOL/L (ref 136–145)
WBC # BLD AUTO: 13 X10*3/UL (ref 4.4–11.3)

## 2024-04-26 PROCEDURE — 2500000004 HC RX 250 GENERAL PHARMACY W/ HCPCS (ALT 636 FOR OP/ED): Performed by: INTERNAL MEDICINE

## 2024-04-26 PROCEDURE — 99152 MOD SED SAME PHYS/QHP 5/>YRS: CPT | Performed by: INTERNAL MEDICINE

## 2024-04-26 PROCEDURE — 2500000005 HC RX 250 GENERAL PHARMACY W/O HCPCS: Performed by: NURSE PRACTITIONER

## 2024-04-26 PROCEDURE — 36415 COLL VENOUS BLD VENIPUNCTURE: CPT | Performed by: CLINICAL NURSE SPECIALIST

## 2024-04-26 PROCEDURE — 5A1D70Z PERFORMANCE OF URINARY FILTRATION, INTERMITTENT, LESS THAN 6 HOURS PER DAY: ICD-10-PCS | Performed by: INTERNAL MEDICINE

## 2024-04-26 PROCEDURE — B2111ZZ FLUOROSCOPY OF MULTIPLE CORONARY ARTERIES USING LOW OSMOLAR CONTRAST: ICD-10-PCS | Performed by: INTERNAL MEDICINE

## 2024-04-26 PROCEDURE — C1760 CLOSURE DEV, VASC: HCPCS | Performed by: INTERNAL MEDICINE

## 2024-04-26 PROCEDURE — C1769 GUIDE WIRE: HCPCS | Performed by: INTERNAL MEDICINE

## 2024-04-26 PROCEDURE — 2500000005 HC RX 250 GENERAL PHARMACY W/O HCPCS: Performed by: INTERNAL MEDICINE

## 2024-04-26 PROCEDURE — 2720000007 HC OR 272 NO HCPCS: Performed by: INTERNAL MEDICINE

## 2024-04-26 PROCEDURE — 83735 ASSAY OF MAGNESIUM: CPT | Performed by: CLINICAL NURSE SPECIALIST

## 2024-04-26 PROCEDURE — 80048 BASIC METABOLIC PNL TOTAL CA: CPT | Performed by: CLINICAL NURSE SPECIALIST

## 2024-04-26 PROCEDURE — 99233 SBSQ HOSP IP/OBS HIGH 50: CPT | Performed by: INTERNAL MEDICINE

## 2024-04-26 PROCEDURE — 99153 MOD SED SAME PHYS/QHP EA: CPT | Performed by: INTERNAL MEDICINE

## 2024-04-26 PROCEDURE — 2500000001 HC RX 250 WO HCPCS SELF ADMINISTERED DRUGS (ALT 637 FOR MEDICARE OP): Performed by: INTERNAL MEDICINE

## 2024-04-26 PROCEDURE — C1894 INTRO/SHEATH, NON-LASER: HCPCS | Performed by: INTERNAL MEDICINE

## 2024-04-26 PROCEDURE — 97116 GAIT TRAINING THERAPY: CPT | Mod: GP,CQ | Performed by: PHYSICAL THERAPY ASSISTANT

## 2024-04-26 PROCEDURE — 6340000001 HC RX 634 EPOETIN <10,000 UNITS: Mod: JZ | Performed by: INTERNAL MEDICINE

## 2024-04-26 PROCEDURE — 4A023N7 MEASUREMENT OF CARDIAC SAMPLING AND PRESSURE, LEFT HEART, PERCUTANEOUS APPROACH: ICD-10-PCS | Performed by: INTERNAL MEDICINE

## 2024-04-26 PROCEDURE — 2060000001 HC INTERMEDIATE ICU ROOM DAILY

## 2024-04-26 PROCEDURE — 2550000001 HC RX 255 CONTRASTS: Performed by: INTERNAL MEDICINE

## 2024-04-26 PROCEDURE — C1887 CATHETER, GUIDING: HCPCS | Performed by: INTERNAL MEDICINE

## 2024-04-26 PROCEDURE — 2500000001 HC RX 250 WO HCPCS SELF ADMINISTERED DRUGS (ALT 637 FOR MEDICARE OP): Performed by: NURSE PRACTITIONER

## 2024-04-26 PROCEDURE — 93458 L HRT ARTERY/VENTRICLE ANGIO: CPT | Performed by: INTERNAL MEDICINE

## 2024-04-26 PROCEDURE — 8010000001 HC DIALYSIS - HEMODIALYSIS PER DAY

## 2024-04-26 PROCEDURE — 97110 THERAPEUTIC EXERCISES: CPT | Mod: GP,CQ | Performed by: PHYSICAL THERAPY ASSISTANT

## 2024-04-26 PROCEDURE — 85027 COMPLETE CBC AUTOMATED: CPT | Performed by: CLINICAL NURSE SPECIALIST

## 2024-04-26 RX ORDER — MIDAZOLAM HYDROCHLORIDE 1 MG/ML
INJECTION, SOLUTION INTRAMUSCULAR; INTRAVENOUS AS NEEDED
Status: DISCONTINUED | OUTPATIENT
Start: 2024-04-26 | End: 2024-04-26 | Stop reason: HOSPADM

## 2024-04-26 RX ORDER — FENTANYL CITRATE 50 UG/ML
INJECTION, SOLUTION INTRAMUSCULAR; INTRAVENOUS AS NEEDED
Status: DISCONTINUED | OUTPATIENT
Start: 2024-04-26 | End: 2024-04-26 | Stop reason: HOSPADM

## 2024-04-26 RX ORDER — SODIUM CHLORIDE 9 MG/ML
INJECTION, SOLUTION INTRAVENOUS CONTINUOUS PRN
Status: COMPLETED | OUTPATIENT
Start: 2024-04-26 | End: 2024-04-26

## 2024-04-26 RX ORDER — HEPARIN SODIUM 1000 [USP'U]/ML
INJECTION, SOLUTION INTRAVENOUS; SUBCUTANEOUS AS NEEDED
Status: DISCONTINUED | OUTPATIENT
Start: 2024-04-26 | End: 2024-04-26 | Stop reason: HOSPADM

## 2024-04-26 RX ORDER — ASPIRIN 325 MG
325 TABLET ORAL DAILY
Status: DISCONTINUED | OUTPATIENT
Start: 2024-04-26 | End: 2024-04-27 | Stop reason: HOSPADM

## 2024-04-26 RX ORDER — LIDOCAINE HYDROCHLORIDE 20 MG/ML
INJECTION, SOLUTION INFILTRATION; PERINEURAL AS NEEDED
Status: DISCONTINUED | OUTPATIENT
Start: 2024-04-26 | End: 2024-04-26 | Stop reason: HOSPADM

## 2024-04-26 RX ADMIN — METHIMAZOLE 5 MG: 5 TABLET ORAL at 08:54

## 2024-04-26 RX ADMIN — Medication 5 L/MIN: at 20:15

## 2024-04-26 RX ADMIN — FLUTICASONE FUROATE AND VILANTEROL TRIFENATATE 1 PUFF: 100; 25 POWDER RESPIRATORY (INHALATION) at 08:54

## 2024-04-26 RX ADMIN — TIOTROPIUM BROMIDE INHALATION SPRAY 2 PUFF: 3.12 SPRAY, METERED RESPIRATORY (INHALATION) at 08:54

## 2024-04-26 RX ADMIN — ASPIRIN 325 MG ORAL TABLET 325 MG: 325 PILL ORAL at 13:00

## 2024-04-26 RX ADMIN — HEPARIN SODIUM 1800 UNITS: 1000 INJECTION INTRAVENOUS; SUBCUTANEOUS at 18:10

## 2024-04-26 RX ADMIN — ATORVASTATIN CALCIUM 40 MG: 40 TABLET, FILM COATED ORAL at 20:45

## 2024-04-26 RX ADMIN — CARVEDILOL 6.25 MG: 6.25 TABLET, FILM COATED ORAL at 08:53

## 2024-04-26 RX ADMIN — HEPARIN SODIUM 1800 UNITS: 1000 INJECTION INTRAVENOUS; SUBCUTANEOUS at 18:11

## 2024-04-26 RX ADMIN — Medication 1 CAPSULE: at 08:54

## 2024-04-26 RX ADMIN — MONTELUKAST 10 MG: 10 TABLET, FILM COATED ORAL at 20:45

## 2024-04-26 RX ADMIN — PREDNISONE 40 MG: 20 TABLET ORAL at 08:53

## 2024-04-26 RX ADMIN — CARVEDILOL 6.25 MG: 6.25 TABLET, FILM COATED ORAL at 18:14

## 2024-04-26 RX ADMIN — EPOETIN ALFA-EPBX 5000 UNITS: 10000 INJECTION, SOLUTION INTRAVENOUS; SUBCUTANEOUS at 18:14

## 2024-04-26 ASSESSMENT — PAIN SCALES - GENERAL
PAINLEVEL_OUTOF10: 0 - NO PAIN

## 2024-04-26 ASSESSMENT — COGNITIVE AND FUNCTIONAL STATUS - GENERAL
PERSONAL GROOMING: A LITTLE
CLIMB 3 TO 5 STEPS WITH RAILING: TOTAL
TURNING FROM BACK TO SIDE WHILE IN FLAT BAD: A LITTLE
TOILETING: A LOT
STANDING UP FROM CHAIR USING ARMS: A LITTLE
MOBILITY SCORE: 18
CLIMB 3 TO 5 STEPS WITH RAILING: A LOT
WALKING IN HOSPITAL ROOM: A LITTLE
DAILY ACTIVITIY SCORE: 18
TOILETING: A LOT
HELP NEEDED FOR BATHING: A LITTLE
DRESSING REGULAR UPPER BODY CLOTHING: A LITTLE
DRESSING REGULAR UPPER BODY CLOTHING: A LITTLE
CLIMB 3 TO 5 STEPS WITH RAILING: A LOT
DAILY ACTIVITIY SCORE: 18
WALKING IN HOSPITAL ROOM: A LITTLE
STANDING UP FROM CHAIR USING ARMS: A LITTLE
MOVING TO AND FROM BED TO CHAIR: A LITTLE
DRESSING REGULAR LOWER BODY CLOTHING: A LITTLE
TURNING FROM BACK TO SIDE WHILE IN FLAT BAD: A LITTLE
DRESSING REGULAR LOWER BODY CLOTHING: A LITTLE
MOBILITY SCORE: 17
PERSONAL GROOMING: A LITTLE
HELP NEEDED FOR BATHING: A LITTLE
MOBILITY SCORE: 18
WALKING IN HOSPITAL ROOM: A LITTLE
TURNING FROM BACK TO SIDE WHILE IN FLAT BAD: A LITTLE
STANDING UP FROM CHAIR USING ARMS: A LITTLE

## 2024-04-26 ASSESSMENT — PAIN - FUNCTIONAL ASSESSMENT
PAIN_FUNCTIONAL_ASSESSMENT: 0-10

## 2024-04-26 NOTE — PROGRESS NOTES
Occupational Therapy                 Therapy Communication Note    Patient Name: Renard Ward  MRN: [id number]  Today's Date: 4/26/2024     Discipline: Occupational Therapy    Missed Visit Reason:      Missed Time: Attempt    Comment:  pt states that he is going for a heart cath very soon and then to dialysis after. Pt declined tx for today

## 2024-04-26 NOTE — PROGRESS NOTES
Renard Ward is a 73 y.o. male on day 5 of admission presenting with COPD exacerbation (Multi).      Subjective   Renard Ward is a 73 y.o. male with PMHx s/f COPD end-stage renal disease hypertension atrial flutter with ablations 2 prior occasions presenting with shortness of breath and chills.  Patient had recently been hospitalized last month and treated for pneumonia COPD exacerbation he progressively had decreased activity tolerance more more short of breath he completed outpatient course of outpatient antibiotics and steroids but continued to get worse.  He also noted that he had decreased urinary output he has continued his home dialysis routine.  He does have chills but denies fevers he is not having much cough or sputum production his respiratory excursion feels tight to him he has had some nausea no abdominal pain no urinary symptoms no diarrhea no constipation no new rashes.  In the emergency department he was afebrile 97.4 heart rate 83 regular respiratory rate 20 blood pressure 129/61 SpO2 87% on 2 L patient had been on is much as 5 L at home.  Blood gas was taken found the patient to be acidotic with elevated pCO2 of 96 pO2 34 subsequently started on BiPAP therapy with improvement in pCO2 and stop and oxygen levels patient now saturating in the low 90s consistently.  Influenza and coronavirus swabs were negative CBC shows mild leukocytosis 11.8 hemoglobin 9.1 hematocrit 30 platelets 196 BN peptide is elevated at 1040 troponin 38 trending down to 33 creatinine 5.87.  Patient had CT of the head no acute changes chest x-ray shows opacification of the right base patchy density in the right lower to mid lung increased from prior exam 5 days prior increased opacification of the left base.  The patient will be admitted for severe shortness of breath hypoxia hypercapnia as he requires BiPAP therapy will obtain nephrology consultation to manage his volume continue treating his respiratory conditions  with IV steroids nebulizer treatments IV antibiotics.      04/22: Patient is feeling better, shortness of breath is improving, weaned off of BiPAP and currently maintaining saturation on 5 L.    04/23: Patient was evaluated this morning, no acute event overnight, shortness of breath is improving.  04/24: Patient was evaluated this morning, overnight patient flipped back to [facility] with heart rate in 80s to 90s.  Shortness of breath is gradually improving, no fever or chills.    4/25: He is without complaint. [facility] with coronary angiogram planned for tomorrow morning.    4/26: He remains without complaint. Going for [facility] with coronary angiogram today.                Objective     Last Recorded Vitals  /61 (BP Location: Right arm, Patient Position: Lying)   Pulse 75   Temp 36.5 °C (97.7 °F) (Temporal)   Resp 18   Wt 72.1 kg (158 lb 15.2 oz)   SpO2 98%   Intake/Output last 3 Shifts:    Intake/Output Summary (Last 24 hours) at 4/26/2024 1113  Last data filed at 4/26/2024 1052  Gross per 24 hour   Intake 1060 ml   Output 3900 ml   Net -2840 ml       Admission Weight  Weight: 83 kg (182 lb 15.7 oz) (04/21/24 1006)    Daily Weight  04/26/24 : 72.1 kg (158 lb 15.2 oz)    Image Results  Electrocardiogram, 12-lead PRN ACS symptoms  Atrial fibrillation with premature ventricular or aberrantly conducted complexes  Abnormal ECG  When compared with ECG of 21-APR-2024 13:19,  PREVIOUS ECG IS PRESENT  Confirmed by Mynor Romano ([id number]) on 4/25/2024 8:11:42 AM      Physical Exam  Patient is awake and orient, not in apparent distress  Eyes: PERRLA, no conjunctival congestion  Chest: Bilateral decreased air entry, bibasilar crackles, expiratory wheezing.  Heart: s1S2 regular, no murmur  Abdomen: Soft, non tender, BS present  Ext:    Relevant Results               Assessment/Plan      1.  Acute on chronic hypoxic/hypercarbic respiratory failure  Continue oxygen supplementation and wean as tolerated, patient was started on BiPAP and  gradually being weaned off and currently maintaining saturation at 5L.  Baseline oxygen requirement is 5 L    2.  COPD exacerbation  -continue steroids  -doxycycline complete    3.  Pneumonia, presumably gram-negative  -Ceftriaxone complete  -Doxycycline complete    4.  Acute on chronic diastolic congestive heart failure  Cardiology on board  Dialysis for fluid management    5.  ESRD  Nephrology on board for dialysis  Patient is receiving daily ultrafiltration    6.  Acute pulmonary edema secondary to fluid overload  On dialysis for fluid management    7.  Paroxysmal A-fib/flutter  Eliquis on hold for anticipated heart cath on Friday    8.  Abnormal stress test  Plan for Left heart cath on Friday               Virgilio aWtson MD PhD

## 2024-04-26 NOTE — NURSING NOTE
Pt taken to dialysis post cath. Instructed per cath nurse to remove air when pt returns to SDU. This RN laid eyes on right radial cath site. Pulse palpable. No hematoma noted.

## 2024-04-26 NOTE — INTERVAL H&P NOTE
H&P reviewed. The patient was examined and there are no changes to the H&P.    Unable to update full H&P awaiting cosigner. Reviewed and no changes.

## 2024-04-26 NOTE — PRE-SEDATION DOCUMENTATION
"Interventional Radiology Preprocedure Note    Renard Ward   Indication for procedure: The primary encounter diagnosis was COPD exacerbation (Multi). Diagnoses of Pneumonia due to infectious organism, unspecified laterality, unspecified part of lung, Hypervolemia, unspecified hypervolemia type, Paroxysmal A-fib (Multi), Other hypervolemia, Cor pulmonale (chronic) (Multi), Atrial flutter, paroxysmal (Multi), Acute diastolic heart failure (Multi), Dyspnea on exertion, ESRD (end stage renal disease) (Multi), COPD with acute exacerbation (Multi), Abnormal EKG, Hemodialysis patient (CMS-HCC), and Abnormal stress test were also pertinent to this visit. Patient is here for [facility] by Dr. Hernandez.     Relevant review of systems:  denies shortness of breath.       BP (!) 116/41   Pulse 79   Temp 36.5 °C (97.7 °F) (Temporal)   Resp 17   Ht 1.88 m (6' 2.02\")   Wt 72.1 kg (158 lb 15.2 oz)   SpO2 99%   BMI 20.40 kg/m²    Relevant Labs:   Lab Results   Component Value Date    CREATININE 5.95 (H) 04/26/2024    EGFR 9 (L) 04/26/2024    INR 1.5 (H) 03/05/2024    PROTIME 16.9 (H) 03/05/2024       Planned Sedation/Anesthesia: Moderate    Airway assessment: normal    Physical Exam  Constitutional:       Appearance: Normal appearance.   HENT:      Nose: Nose normal.   Cardiovascular:      Rate and Rhythm: Normal rate and regular rhythm.      Pulses: Normal pulses.      Heart sounds: Murmur heard.      Comments: Left upper arm AV fistula   Pulmonary:      Effort: Pulmonary effort is normal.      Breath sounds: Normal breath sounds.   Skin:     General: Skin is warm and dry.      Capillary Refill: Capillary refill takes less than 2 seconds.   Neurological:      General: No focal deficit present.      Mental Status: He is alert and oriented to person, place, and time.   Psychiatric:         Mood and Affect: Mood normal.         Behavior: Behavior normal.         Mallampati: III (soft and hard palate and base of uvula visible)    ASA " Score: ASA 3 - Patient with moderate systemic disease with functional limitations    Benefits, risks and alternatives of procedure and planned sedation have been discussed with the patient and/or their representative. All questions answered and they agree to proceed.     Tiffany Gomez, ALONDRA-CNP

## 2024-04-26 NOTE — POST-PROCEDURE NOTE
Physician Transition of Care Summary  Invasive Cardiovascular Lab    Procedure Date: 4/26/2024  Attending:    * Perez Hernandez - Primary  Resident/Fellow/Other Assistant: Surgeons and Role:  * No surgeons found with a matching role *    Indications:   Pre-op Diagnosis     * Paroxysmal A-fib (Multi) [I48.0]     * ESRD (end stage renal disease) (Multi) [N18.6]     * COPD with acute exacerbation (Multi) [J44.1]     * Hemodialysis patient (CMS-HCC) [Z99.2]     * Abnormal stress test [R94.39]    Post-procedure diagnosis:   Post-op Diagnosis     * Paroxysmal A-fib (Multi) [I48.0]     * ESRD (end stage renal disease) (Multi) [N18.6]     * COPD with acute exacerbation (Multi) [J44.1]     * Hemodialysis patient (CMS-HCC) [Z99.2]     * Abnormal stress test [R94.39]    Procedure(s):   Left Heart Cath  53589 - OK CATH PLMT L HRT & ARTS W/NJX & ANGIO IMG S&I        Procedure Findings:   Non-obstructive CAD    Description of the Procedure:   [facility]  RCFA> 6 Indonesian angioseal     Complications:   None     Stents/Implants:   Implants       No implant documentation for this case.            Anticoagulation/Antiplatelet Plan:   May resume eliquis on 4/27/24 in the AM if no issues with RCFA    Estimated Blood Loss:   4 mL    Anesthesia: Moderate Sedation Anesthesia Staff: No anesthesia staff entered.    Any Specimen(s) Removed:   No specimens collected during this procedure.    Disposition:   Floor     Recs:   Medical management   Okay to discharge from cardiac standpoint       Electronically signed by: ALONDRA Ochoa-CNP, 4/26/2024 3:17 PM

## 2024-04-26 NOTE — PROGRESS NOTES
Reason For Consult   Esrd     Interval History    Patient is doing   better seenon floor grumbling about not getting to eat yet  getting ultrafiltration only tolerating treatment well and goal is to further remove volume.     ROS  Denies chest pain, shortness of breath,  Nausea, vomiting , diarrhea, fever or chills.     No change in Past Medical History         I&O 24HR    Intake/Output Summary (Last 24 hours) at 4/26/2024 1353  Last data filed at 4/26/2024 1052  Gross per 24 hour   Intake 260 ml   Output 0 ml   Net 260 ml       Vitals 24HR  Heart Rate:  [73-85]   Temp:  [36.3 °C (97.4 °F)-36.6 °C (97.8 °F)]   Resp:  [17-19]   BP: ()/(41-63)   Weight:  [72.1 kg (158 lb 15.2 oz)]   SpO2:  [97 %-100 %]     Results from last 7 days   Lab Units 04/26/24  0422 04/24/24 0433 04/23/24  0429   SODIUM mmol/L 136 136 139   POTASSIUM mmol/L 5.2 4.4 4.3   CHLORIDE mmol/L 101 101 102   CO2 mmol/L 25 27 33*   BUN mg/dL 85* 61* 30*   CREATININE mg/dL 5.95* 5.34* 3.53*   EGFR mL/min/1.73m*2 9* 11* 17*   GLUCOSE mg/dL 98 115* 134*   CALCIUM mg/dL 8.6 8.6 8.5*     Results from last 7 days   Lab Units 04/26/24  0422 04/24/24 0433 04/23/24  0429   WBC AUTO x10*3/uL 13.0* 14.1* 12.3*   HEMOGLOBIN g/dL 8.5* 7.5* 7.4*   HEMATOCRIT % 27.7* 25.6* 25.2*   PLATELETS AUTO x10*3/uL 163 176 181       Physical Exam  Constitutional:       Appearance: Normal appearance.   HENT:      Mouth/Throat:      Mouth: Mucous membranes are moist.   Eyes:      General: No scleral icterus.     Conjunctiva/sclera: Conjunctivae normal.   Cardiovascular:      Rate and Rhythm: Normal rate and regular rhythm.      Heart sounds: Normal heart sounds. No murmur heard.  Pulmonary:      Effort: Pulmonary effort is normal.      Breath sounds: Normal breath sounds.   Abdominal:      General: Abdomen is flat.      Palpations: Abdomen is soft.   Musculoskeletal:      Cervical back: Neck supple.   Skin:     General: Skin is warm and dry.   Neurological:      Mental  Status: He is alert and oriented to person, place, and time. Mental status is at baseline.   Psychiatric:         Mood and Affect: Mood normal.         Behavior: Behavior normal.         Relevant Results               Scheduled medications  [Held by provider] apixaban, 5 mg, oral, BID  aspirin, 325 mg, oral, Daily  atorvastatin, 40 mg, oral, Nightly  B complex-vitamin C-folic acid, 1 capsule, oral, Daily  carvedilol, 6.25 mg, oral, BID with meals  epoetin jennifer or biosimilar, 5,000 Units, subcutaneous, Once per day on Monday Wednesday Friday  tiotropium, 2 puff, inhalation, Daily   And  fluticasone furoate-vilanteroL, 1 puff, inhalation, Daily  heparin, 1,000 Units, intra-catheter, After Dialysis  heparin, 1,000 Units, intra-catheter, After Dialysis  LORazepam, 1 mg, intravenous, Once  methIMAzole, 5 mg, oral, Daily  montelukast, 10 mg, oral, Nightly  perflutren protein A microsphere, 0.5 mL, intravenous, Once in imaging  predniSONE, 40 mg, oral, Daily   Followed by  [START ON 4/28/2024] predniSONE, 20 mg, oral, Daily   Followed by  [START ON 5/1/2024] predniSONE, 10 mg, oral, Daily   Followed by  [START ON 5/4/2024] predniSONE, 5 mg, oral, Daily  sodium chloride, 200 mL, intravenous, After Dialysis      Continuous medications     PRN medications  PRN medications: fentaNYL PF, heparin, ipratropium-albuteroL, lidocaine, midazolam, oxygen    Results for orders placed or performed during the hospital encounter of 04/21/24 (from the past 96 hour(s))   Basic Metabolic Panel   Result Value Ref Range    Glucose 134 (H) 74 - 99 mg/dL    Sodium 139 136 - 145 mmol/L    Potassium 4.3 3.5 - 5.3 mmol/L    Chloride 102 98 - 107 mmol/L    Bicarbonate 33 (H) 21 - 32 mmol/L    Anion Gap 8 (L) 10 - 20 mmol/L    Urea Nitrogen 30 (H) 6 - 23 mg/dL    Creatinine 3.53 (H) 0.50 - 1.30 mg/dL    eGFR 17 (L) >60 mL/min/1.73m*2    Calcium 8.5 (L) 8.6 - 10.3 mg/dL   CBC   Result Value Ref Range    WBC 12.3 (H) 4.4 - 11.3 x10*3/uL    nRBC 0.0 0.0  - 0.0 /100 WBCs    RBC 2.50 (L) 4.50 - 5.90 x10*6/uL    Hemoglobin 7.4 (L) 13.5 - 17.5 g/dL    Hematocrit 25.2 (L) 41.0 - 52.0 %     (H) 80 - 100 fL    MCH 29.6 26.0 - 34.0 pg    MCHC 29.4 (L) 32.0 - 36.0 g/dL    RDW 14.9 (H) 11.5 - 14.5 %    Platelets 181 150 - 450 x10*3/uL   Magnesium   Result Value Ref Range    Magnesium 1.76 1.60 - 2.40 mg/dL   Hepatitis B surface antibody   Result Value Ref Range    Hepatitis B Surface AB <3.1 <10.0 mIU/mL   Ferritin   Result Value Ref Range    Ferritin 901 (H) 20 - 300 ng/mL   Iron and TIBC   Result Value Ref Range    Iron 123 35 - 150 ug/dL    UIBC 79 (L) 110 - 370 ug/dL    TIBC 202 (L) 240 - 445 ug/dL    % Saturation 61 (H) 25 - 45 %   Electrocardiogram, 12-lead PRN ACS symptoms   Result Value Ref Range    Ventricular Rate 90 BPM    Atrial Rate 96 BPM    QRS Duration 104 ms    QT Interval 356 ms    QTC Calculation(Bazett) 435 ms    R Axis 84 degrees    T Axis 12 degrees    QRS Count 14 beats    Q Onset 215 ms    T Offset 393 ms    QTC Fredericia 407 ms   Basic Metabolic Panel   Result Value Ref Range    Glucose 115 (H) 74 - 99 mg/dL    Sodium 136 136 - 145 mmol/L    Potassium 4.4 3.5 - 5.3 mmol/L    Chloride 101 98 - 107 mmol/L    Bicarbonate 27 21 - 32 mmol/L    Anion Gap 12 10 - 20 mmol/L    Urea Nitrogen 61 (H) 6 - 23 mg/dL    Creatinine 5.34 (H) 0.50 - 1.30 mg/dL    eGFR 11 (L) >60 mL/min/1.73m*2    Calcium 8.6 8.6 - 10.3 mg/dL   CBC   Result Value Ref Range    WBC 14.1 (H) 4.4 - 11.3 x10*3/uL    nRBC 0.0 0.0 - 0.0 /100 WBCs    RBC 2.56 (L) 4.50 - 5.90 x10*6/uL    Hemoglobin 7.5 (L) 13.5 - 17.5 g/dL    Hematocrit 25.6 (L) 41.0 - 52.0 %     80 - 100 fL    MCH 29.3 26.0 - 34.0 pg    MCHC 29.3 (L) 32.0 - 36.0 g/dL    RDW 15.2 (H) 11.5 - 14.5 %    Platelets 176 150 - 450 x10*3/uL   Magnesium   Result Value Ref Range    Magnesium 1.70 1.60 - 2.40 mg/dL   CBC   Result Value Ref Range    WBC 13.0 (H) 4.4 - 11.3 x10*3/uL    nRBC 0.0 0.0 - 0.0 /100 WBCs    RBC 2.81  (L) 4.50 - 5.90 x10*6/uL    Hemoglobin 8.5 (L) 13.5 - 17.5 g/dL    Hematocrit 27.7 (L) 41.0 - 52.0 %    MCV 99 80 - 100 fL    MCH 30.2 26.0 - 34.0 pg    MCHC 30.7 (L) 32.0 - 36.0 g/dL    RDW 14.9 (H) 11.5 - 14.5 %    Platelets 163 150 - 450 x10*3/uL   Basic Metabolic Panel   Result Value Ref Range    Glucose 98 74 - 99 mg/dL    Sodium 136 136 - 145 mmol/L    Potassium 5.2 3.5 - 5.3 mmol/L    Chloride 101 98 - 107 mmol/L    Bicarbonate 25 21 - 32 mmol/L    Anion Gap 15 10 - 20 mmol/L    Urea Nitrogen 85 (H) 6 - 23 mg/dL    Creatinine 5.95 (H) 0.50 - 1.30 mg/dL    eGFR 9 (L) >60 mL/min/1.73m*2    Calcium 8.6 8.6 - 10.3 mg/dL   Magnesium   Result Value Ref Range    Magnesium 1.86 1.60 - 2.40 mg/dL         Assessment/Plan               Assessment/Plan       End-stage renal disease N18.6  Hypertension I12  Dependence on hemodialysis Z99.2  Acute on chronic hypoxemic  respiratory failure J96.21, J96.22  Atrial fibrillation chronic  Anemia of chronic kidney disease     RECOMMENDATIONS:    Will perform dialysis per Monday Wednesday Friday schedule while inpatient, if patient is discharged we will resume 4 days a week outpatient home hemodialysis.  Defer antibiotic therapy to primary service.  Acute respiratory failure is responding to volume management.  Remains in respiratory failure at baseline patient is able to be weaned off oxygen completely  Established patient's dry weight, check floor scale weight after dialysis on April 25 discussed with bedside dialysis nurse  Will order a dose of intravenous iron with dialysis on April 26  Likely dry weight is between 77 to 78 kg's  Hypertension is well-controlled.,  Hemoglobin is quite low at this point resume erythropoietin stimulating agents   Patient remains on Coreg and presently getting Rocephin   Renal multivitamin initiated

## 2024-04-26 NOTE — PROGRESS NOTES
Physical Therapy    Physical Therapy Treatment    Patient Name: Renard Ward  MRN: [id number]  Today's Date: 4/26/2024  Time Calculation  Start Time: 0810  Stop Time: 0840  Time Calculation (min): 30 min       Assessment/Plan   PT Assessment  End of Session Communication: Bedside nurse  Assessment Comment: pt is making good progress toward all goals set by PT. He would benefit from further treatment to improve safety and mobility.  End of Session Patient Position: Up in chair, Alarm on     PT Plan  Treatment/Interventions: Gait training, Bed mobility, Transfer training, Stair training, Balance training, Endurance training, Therapeutic exercise, Therapeutic activity, Home exercise program  PT Plan: Skilled PT  PT Frequency: 4 times per week  PT Discharge Recommendations: Low intensity level of continued care  Equipment Recommended upon Discharge:  (Patient states he has a WW at home.)  PT Recommended Transfer Status: Assist x1  PT - OK to Discharge: Yes (Once medically cleared.)      General Visit Information:   PT  Visit  PT Received On: 04/26/24  Response to Previous Treatment: Patient with no complaints from previous session.  General  Reason for Referral: 74 y/o male admitted with dx of acute on chronic hypoxic /hypercarbic respiratory failure, COPD exacerbation, pneumonia, and CHF.  Referred By: Marya Levin MD  Past Medical History Relevant to Rehab: COPD, end-stage renal disease, hypertension, atrial flutter with ablations, CHF, LEO, TIA  Prior to Session Communication: Bedside nurse  Patient Position Received: Bed, 3 rail up, Alarm on  Preferred Learning Style: verbal  General Comment: pt agreeable to PT. pt reporting he is having heart cath today.      Precautions:  Precautions  Medical Precautions: Fall precautions, Oxygen therapy device and L/min  Precautions Comment: Fall risk. Monitor PSO2 and HR with activity. Cues for pursed lip breathing. Hemoglobin 7.5 - low activity tolerance. Dialysis.            Pain:  Pain Assessment  Pain Assessment: 0-10  Pain Score: 0 - No pain  Cognition:  Cognition  Overall Cognitive Status: Within Functional Limits       Treatments:  Therapeutic Exercise  Therapeutic Exercise Performed: Yes (cues for improved technique)  Therapeutic Exercise Activity 1: ankle pumps x20  Therapeutic Exercise Activity 2: LAQ x20  Therapeutic Exercise Activity 3: Marches x20  Therapeutic Exercise Activity 4: hip ab/adduction x20  Therapeutic Exercise Activity 5: Glut sets x20    Bed Mobility  Bed Mobility: Yes  Bed Mobility 1  Bed Mobility 1: Supine to sitting, Sitting to supine  Level of Assistance 1: Close supervision  Bed Mobility Comments 1: occasional cues for safe technique.    Ambulation/Gait Training  Ambulation/Gait Training Performed: Yes  Ambulation/Gait Training 1  Surface 1: Level tile  Device 1: Rolling walker  Assistance 1: Contact guard  Quality of Gait 1: Decreased step length, Diminished heel strike  Comments/Distance (ft) 1: 75'x2 and 45'x1 with cues for walker safety and technique.  Transfers  Transfer: Yes  Transfer 1  Technique 1: Sit to stand, Stand to sit  Transfer Device 1: Walker  Transfer Level of Assistance 1: Contact guard  Trials/Comments 1: cues for hand placement and safety.  Transfers 2  Technique 2: Stand pivot  Transfer Device 2: Walker  Transfer Level of Assistance 2: Contact guard  Trials/Comments 2: pt required cues for walker safety.    Outcome Measures:  [facility] Basic Mobility  Turning from your back to your side while in a flat bed without using bedrails: None  Moving from lying on your back to sitting on the side of a flat bed without using bedrails: A little  Moving to and from bed to chair (including a wheelchair): A little  Standing up from a chair using your arms (e.g. wheelchair or bedside chair): A little  To walk in hospital room: A little  Climbing 3-5 steps with railing: Total  Basic Mobility - Total Score: 17    Education Documentation  Mobility  Training, taught by Arielle Magaña PTA at 4/26/2024  8:50 AM.  Learner: Patient  Readiness: Acceptance  Method: Explanation  Response: Verbalizes Understanding, Needs Reinforcement    Education Comments  No comments found.             Encounter Problems       Encounter Problems (Active)       To improve endurance, balance for safe return home with family:        Patient will tolerate sitting upright in a chair for 1 hour and complete seated or supine LE exercise x 10 repetitions each  in order to improve overall activity tolerance. (Progressing)       Start:  04/24/24    Expected End:  04/30/24            Patient will complete bed mobility and transfers with MOD I.  (Progressing)       Start:  04/24/24    Expected End:  04/30/24            Patient will ambulate 45 feet with WW and SBA, safely managing O2 tubing without cues.  (Progressing)       Start:  04/24/24    Expected End:  04/30/24

## 2024-04-27 ENCOUNTER — DOCUMENTATION (OUTPATIENT)
Dept: HOME HEALTH SERVICES | Facility: HOME HEALTH | Age: 74
End: 2024-04-27
Payer: MEDICARE

## 2024-04-27 ENCOUNTER — PATIENT OUTREACH (OUTPATIENT)
Dept: HOME HEALTH SERVICES | Age: 74
End: 2024-04-27
Payer: MEDICARE

## 2024-04-27 ENCOUNTER — HOME HEALTH ADMISSION (OUTPATIENT)
Dept: HOME HEALTH SERVICES | Facility: HOME HEALTH | Age: 74
End: 2024-04-27
Payer: MEDICARE

## 2024-04-27 VITALS
TEMPERATURE: 97.8 F | SYSTOLIC BLOOD PRESSURE: 108 MMHG | OXYGEN SATURATION: 99 % | DIASTOLIC BLOOD PRESSURE: 61 MMHG | BODY MASS INDEX: 20.31 KG/M2 | RESPIRATION RATE: 16 BRPM | HEART RATE: 79 BPM | HEIGHT: 74 IN | WEIGHT: 158.29 LBS

## 2024-04-27 PROBLEM — R94.39 ABNORMAL STRESS TEST: Status: RESOLVED | Noted: 2024-04-21 | Resolved: 2024-04-27

## 2024-04-27 PROBLEM — J44.1 COPD EXACERBATION (MULTI): Status: RESOLVED | Noted: 2024-04-21 | Resolved: 2024-04-27

## 2024-04-27 PROBLEM — H57.02 ANISOCORIA: Status: RESOLVED | Noted: 2024-04-21 | Resolved: 2024-04-27

## 2024-04-27 PROBLEM — J96.22 ACUTE ON CHRONIC RESPIRATORY FAILURE WITH HYPOXIA AND HYPERCAPNIA (MULTI): Status: RESOLVED | Noted: 2020-05-27 | Resolved: 2024-04-27

## 2024-04-27 PROBLEM — J18.9 PNEUMONIA: Status: RESOLVED | Noted: 2024-03-05 | Resolved: 2024-04-27

## 2024-04-27 PROBLEM — E87.70 VOLUME OVERLOAD: Status: RESOLVED | Noted: 2024-04-21 | Resolved: 2024-04-27

## 2024-04-27 PROBLEM — J96.21 ACUTE ON CHRONIC RESPIRATORY FAILURE WITH HYPOXIA AND HYPERCAPNIA (MULTI): Status: RESOLVED | Noted: 2020-05-27 | Resolved: 2024-04-27

## 2024-04-27 PROBLEM — J44.1 COPD WITH ACUTE EXACERBATION (MULTI): Status: RESOLVED | Noted: 2024-04-04 | Resolved: 2024-04-27

## 2024-04-27 PROCEDURE — 2500000004 HC RX 250 GENERAL PHARMACY W/ HCPCS (ALT 636 FOR OP/ED): Performed by: NURSE PRACTITIONER

## 2024-04-27 PROCEDURE — 2500000001 HC RX 250 WO HCPCS SELF ADMINISTERED DRUGS (ALT 637 FOR MEDICARE OP): Performed by: NURSE PRACTITIONER

## 2024-04-27 PROCEDURE — 99239 HOSP IP/OBS DSCHRG MGMT >30: CPT | Performed by: INTERNAL MEDICINE

## 2024-04-27 PROCEDURE — 94761 N-INVAS EAR/PLS OXIMETRY MLT: CPT

## 2024-04-27 PROCEDURE — 2500000005 HC RX 250 GENERAL PHARMACY W/O HCPCS: Performed by: NURSE PRACTITIONER

## 2024-04-27 RX ADMIN — Medication 5 L/MIN: at 08:15

## 2024-04-27 RX ADMIN — CARVEDILOL 6.25 MG: 6.25 TABLET, FILM COATED ORAL at 08:31

## 2024-04-27 RX ADMIN — FLUTICASONE FUROATE AND VILANTEROL TRIFENATATE 1 PUFF: 100; 25 POWDER RESPIRATORY (INHALATION) at 08:32

## 2024-04-27 RX ADMIN — PREDNISONE 40 MG: 20 TABLET ORAL at 08:31

## 2024-04-27 RX ADMIN — ASPIRIN 325 MG ORAL TABLET 325 MG: 325 PILL ORAL at 08:31

## 2024-04-27 RX ADMIN — TIOTROPIUM BROMIDE INHALATION SPRAY 2 PUFF: 3.12 SPRAY, METERED RESPIRATORY (INHALATION) at 08:32

## 2024-04-27 RX ADMIN — Medication 1 CAPSULE: at 08:31

## 2024-04-27 RX ADMIN — METHIMAZOLE 5 MG: 5 TABLET ORAL at 08:31

## 2024-04-27 SDOH — ECONOMIC STABILITY: INCOME INSECURITY: IN THE LAST 12 MONTHS, WAS THERE A TIME WHEN YOU WERE NOT ABLE TO PAY THE MORTGAGE OR RENT ON TIME?: NO

## 2024-04-27 SDOH — SOCIAL STABILITY: SOCIAL NETWORK: HOW OFTEN DO YOU GET TOGETHER WITH FRIENDS OR RELATIVES?: NEVER

## 2024-04-27 SDOH — SOCIAL STABILITY: SOCIAL INSECURITY
WITHIN THE LAST YEAR, HAVE TO BEEN RAPED OR FORCED TO HAVE ANY KIND OF SEXUAL ACTIVITY BY YOUR PARTNER OR EX-PARTNER?: NO

## 2024-04-27 SDOH — ECONOMIC STABILITY: HOUSING INSECURITY
IN THE LAST 12 MONTHS, WAS THERE A TIME WHEN YOU DID NOT HAVE A STEADY PLACE TO SLEEP OR SLEPT IN A SHELTER (INCLUDING NOW)?: NO

## 2024-04-27 SDOH — ECONOMIC STABILITY: INCOME INSECURITY: IN THE PAST 12 MONTHS, HAS THE ELECTRIC, GAS, OIL, OR WATER COMPANY THREATENED TO SHUT OFF SERVICE IN YOUR HOME?: NO

## 2024-04-27 SDOH — HEALTH STABILITY: MENTAL HEALTH
HOW OFTEN DO YOU NEED TO HAVE SOMEONE HELP YOU WHEN YOU READ INSTRUCTIONS, PAMPHLETS, OR OTHER WRITTEN MATERIAL FROM YOUR DOCTOR OR PHARMACY?: SOMETIMES

## 2024-04-27 SDOH — SOCIAL STABILITY: SOCIAL NETWORK: ARE YOU MARRIED, WIDOWED, DIVORCED, SEPARATED, NEVER MARRIED, OR LIVING WITH A PARTNER?: MARRIED

## 2024-04-27 SDOH — SOCIAL STABILITY: SOCIAL INSECURITY: WITHIN THE LAST YEAR, HAVE YOU BEEN AFRAID OF YOUR PARTNER OR EX-PARTNER?: NO

## 2024-04-27 SDOH — SOCIAL STABILITY: SOCIAL INSECURITY
WITHIN THE LAST YEAR, HAVE YOU BEEN KICKED, HIT, SLAPPED, OR OTHERWISE PHYSICALLY HURT BY YOUR PARTNER OR EX-PARTNER?: NO

## 2024-04-27 SDOH — ECONOMIC STABILITY: FOOD INSECURITY: WITHIN THE PAST 12 MONTHS, YOU WORRIED THAT YOUR FOOD WOULD RUN OUT BEFORE YOU GOT MONEY TO BUY MORE.: NEVER TRUE

## 2024-04-27 SDOH — SOCIAL STABILITY: SOCIAL INSECURITY: WITHIN THE LAST YEAR, HAVE YOU BEEN HUMILIATED OR EMOTIONALLY ABUSED IN OTHER WAYS BY YOUR PARTNER OR EX-PARTNER?: NO

## 2024-04-27 SDOH — ECONOMIC STABILITY: FOOD INSECURITY: WITHIN THE PAST 12 MONTHS, THE FOOD YOU BOUGHT JUST DIDN'T LAST AND YOU DIDN'T HAVE MONEY TO GET MORE.: NEVER TRUE

## 2024-04-27 SDOH — SOCIAL STABILITY: SOCIAL NETWORK
DO YOU BELONG TO ANY CLUBS OR ORGANIZATIONS SUCH AS CHURCH GROUPS UNIONS, FRATERNAL OR ATHLETIC GROUPS, OR SCHOOL GROUPS?: NO

## 2024-04-27 SDOH — ECONOMIC STABILITY: INCOME INSECURITY: HOW HARD IS IT FOR YOU TO PAY FOR THE VERY BASICS LIKE FOOD, HOUSING, MEDICAL CARE, AND HEATING?: NOT VERY HARD

## 2024-04-27 SDOH — HEALTH STABILITY: PHYSICAL HEALTH: ON AVERAGE, HOW MANY MINUTES DO YOU ENGAGE IN EXERCISE AT THIS LEVEL?: 0 MIN

## 2024-04-27 SDOH — HEALTH STABILITY: MENTAL HEALTH
STRESS IS WHEN SOMEONE FEELS TENSE, NERVOUS, ANXIOUS, OR CAN'T SLEEP AT NIGHT BECAUSE THEIR MIND IS TROUBLED. HOW STRESSED ARE YOU?: ONLY A LITTLE

## 2024-04-27 SDOH — ECONOMIC STABILITY: TRANSPORTATION INSECURITY
IN THE PAST 12 MONTHS, HAS THE LACK OF TRANSPORTATION KEPT YOU FROM MEDICAL APPOINTMENTS OR FROM GETTING MEDICATIONS?: NO

## 2024-04-27 SDOH — ECONOMIC STABILITY: HOUSING INSECURITY: IN THE LAST 12 MONTHS, HOW MANY PLACES HAVE YOU LIVED?: 1

## 2024-04-27 SDOH — SOCIAL STABILITY: SOCIAL NETWORK: HOW OFTEN DO YOU ATTENT MEETINGS OF THE CLUB OR ORGANIZATION YOU BELONG TO?: NEVER

## 2024-04-27 SDOH — SOCIAL STABILITY: SOCIAL NETWORK: HOW OFTEN DO YOU ATTEND CHURCH OR RELIGIOUS SERVICES?: NEVER

## 2024-04-27 SDOH — SOCIAL STABILITY: SOCIAL NETWORK: IN A TYPICAL WEEK, HOW MANY TIMES DO YOU TALK ON THE PHONE WITH FAMILY, FRIENDS, OR NEIGHBORS?: THREE TIMES A WEEK

## 2024-04-27 SDOH — ECONOMIC STABILITY: TRANSPORTATION INSECURITY
IN THE PAST 12 MONTHS, HAS LACK OF TRANSPORTATION KEPT YOU FROM MEETINGS, WORK, OR FROM GETTING THINGS NEEDED FOR DAILY LIVING?: NO

## 2024-04-27 SDOH — HEALTH STABILITY: PHYSICAL HEALTH: ON AVERAGE, HOW MANY DAYS PER WEEK DO YOU ENGAGE IN MODERATE TO STRENUOUS EXERCISE (LIKE A BRISK WALK)?: 0 DAYS

## 2024-04-27 ASSESSMENT — PAIN - FUNCTIONAL ASSESSMENT
PAIN_FUNCTIONAL_ASSESSMENT: 0-10

## 2024-04-27 ASSESSMENT — COGNITIVE AND FUNCTIONAL STATUS - GENERAL
WALKING IN HOSPITAL ROOM: A LITTLE
MOBILITY SCORE: 20
TOILETING: A LITTLE
DAILY ACTIVITIY SCORE: 21
MOVING TO AND FROM BED TO CHAIR: A LITTLE
DRESSING REGULAR LOWER BODY CLOTHING: A LITTLE
CLIMB 3 TO 5 STEPS WITH RAILING: A LITTLE
STANDING UP FROM CHAIR USING ARMS: A LITTLE
HELP NEEDED FOR BATHING: A LITTLE

## 2024-04-27 ASSESSMENT — PAIN SCALES - GENERAL
PAINLEVEL_OUTOF10: 0 - NO PAIN

## 2024-04-27 NOTE — DISCHARGE SUMMARY
DISCHARGE SUMMARY     Discharge Diagnosis  COPD exacerbation (Multi)    This discharge took greater than 35 minutes.    Test Results Pending At Discharge  Pending Labs       No current pending labs.            Hospital Course   Renard Ward is a 73 y.o. male with PMHx s/f COPD end-stage renal disease hypertension atrial flutter with ablations 2 prior occasions presenting with shortness of breath and chills.  Patient had recently been hospitalized last month and treated for pneumonia COPD exacerbation he progressively had decreased activity tolerance more more short of breath he completed outpatient course of outpatient antibiotics and steroids but continued to get worse.  He also noted that he had decreased urinary output he has continued his home dialysis routine.  He does have chills but denies fevers he is not having much cough or sputum production his respiratory excursion feels tight to him he has had some nausea no abdominal pain no urinary symptoms no diarrhea no constipation no new rashes.  In the emergency department he was afebrile 97.4 heart rate 83 regular respiratory rate 20 blood pressure 129/61 SpO2 87% on 2 L patient had been on is much as 5 L at home.  Blood gas was taken found the patient to be acidotic with elevated pCO2 of 96 pO2 34 subsequently started on BiPAP therapy with improvement in pCO2 and stop and oxygen levels patient now saturating in the low 90s consistently.  Influenza and coronavirus swabs were negative CBC shows mild leukocytosis 11.8 hemoglobin 9.1 hematocrit 30 platelets 196 BN peptide is elevated at 1040 troponin 38 trending down to 33 creatinine 5.87.  Patient had CT of the head no acute changes chest x-ray shows opacification of the right base patchy density in the right lower to mid lung increased from prior exam 5 days prior increased opacification of the left base.  The patient will be admitted for severe shortness of breath hypoxia hypercapnia as he requires BiPAP  therapy will obtain nephrology consultation to manage his volume continue treating his respiratory conditions with IV steroids nebulizer treatments IV antibiotics.      04/22: Patient is feeling better, shortness of breath is improving, weaned off of BiPAP and currently maintaining saturation on 5 L.     04/23: Patient was evaluated this morning, no acute event overnight, shortness of breath is improving.    04/24: Patient was evaluated this morning, overnight patient flipped back to [facility] with heart rate in 80s to 90s.  Shortness of breath is gradually improving, no fever or chills.     4/25: He is without complaint. [facility] with coronary angiogram planned for tomorrow morning.     4/26: He remains without complaint. Going for [facility] with coronary angiogram today.    1.  Acute on chronic hypoxic/hypercarbic respiratory failure  Continue oxygen supplementation and wean as tolerated, patient was started on BiPAP and gradually being weaned off and currently maintaining saturation at 5L.  Baseline oxygen requirement is 5 L     2.  COPD exacerbation  -completed steroids  -doxycycline complete     3.  Pneumonia, presumably gram-negative  -Ceftriaxone complete  -Doxycycline complete     4.  Acute on chronic diastolic congestive heart failure  Cardiology on board  Dialysis for fluid management     5.  ESRD  Nephrology on board for dialysis  Home rrt      6.  Acute pulmonary edema secondary to fluid overload  On dialysis for fluid management     7.  Paroxysmal A-fib/flutter  Home meds     8.  Abnormal stress test  4/26 [facility] with coronary angiogram w/ Non-obstructive CAD     Pertinent Physical Exam At Time of Discharge  Patient is awake and orient, not in apparent distress  Eyes: PERRLA, no conjunctival congestion  Chest: Bilateral decreased air entry  Heart: s1S2 regular, no murmur  Abdomen: Soft, non tender, BS present    Home Medications     Medication List      CONTINUE taking these medications     atorvastatin 40 mg tablet;  Commonly known as: Lipitor; Take 1 tablet (40   mg) by mouth once daily at bedtime.   carvedilol 6.25 mg tablet; Commonly known as: Coreg   cyanocobalamin (vitamin B-12) 1,000 mcg tablet extended release;   Commonly known as: Vitamin B-12; Take 1 tablet (1,000 mcg) by mouth once   daily.   Eliquis 5 mg tablet; Generic drug: apixaban   ergocalciferol 1.25 MG (19995 UT) capsule; Commonly known as: Vitamin   D-2   famotidine 40 mg tablet; Commonly known as: Pepcid; Take 1 tablet (40   mg) by mouth 2 times a day.   methIMAzole 5 mg tablet; Commonly known as: Tapazole; Take 1 tablet (5   mg) by mouth once daily.   montelukast 10 mg tablet; Commonly known as: Singulair; TAKE ONE TABLET   BY MOUTH DAILY AT BEDTIME   Mary Grace-Darryl Rx 1- mg-mg-mcg tablet; Generic drug: B complex-vitamin   C-folic acid   torsemide 20 mg tablet; Commonly known as: Demadex   Trelegy Ellipta 100-62.5-25 mcg blister with device; Generic drug:   fluticasone-umeclidin-vilanter; Inhale 1 puff once daily.     STOP taking these medications     azithromycin 250 mg tablet; Commonly known as: Zithromax   predniSONE 20 mg tablet; Commonly known as: Deltasone       Outpatient Follow-Up  No follow-ups on file.     Virgilio Watson MD PhD  4/27/2024  9:30 AM

## 2024-04-27 NOTE — PROGRESS NOTES
Enrollment call completed Patient is HD at home 2 days per week doing daily weights and vitals Patients wife does his dialysis medications and labs patient is currently on 5l nc they would like to get it down to 2-3 liter since he uses a POC for going out and he can't do that at 5L they will get set up on masimo to help with titration no medication needs at this time [facility] scheduled 4/28 @ 1300

## 2024-04-27 NOTE — HH CARE COORDINATION
Home Care received a Referral for Nursing, Physical Therapy, and Occupational Therapy. We have processed the referral for a Start of Care on 4.28 OR 4.29.24.     If you have any questions or concerns, please feel free to contact us at [phone]. Follow the prompts, enter your five digit zip code, and you will be directed to your care team on E[facility] 3.

## 2024-04-27 NOTE — PROGRESS NOTES
"Subjective Data:  Mr. Ward is resting in bed, no acute distress. Remains short of breath at rest and with exertion. He denies chest pain or pressure but continues to feel \"fluttering in my chest at times\". Monitor demonstrates SR with rates in the 80s.     Today, Mr. Ward is sitting in bed getting HD. He denies chest pain or pressure, reports he feels occasional flutter sensation in his chest, shortness of breath has improved, no dizziness or lightheadedness with ambulation today. Monitor demonstrates Afib with rates in the 80-90s this afternoon.     4/25/24:  Patient seen while in HD.  Denies any chest pain or SOB.  He verbalized understanding of plans for LHC tomorrow.  AF on telemetry with HR 70's.    4/27/24: S/p LHC which demonstrated non-obstructive CAD. Today, patient is sitting up in bed, reports he is tired. Rt radial site is dry and intact, no bleeding/ecchymosis or hematoma. Denies chest pain or pressure, no shortness of breath. Monitor demonstrates AF with rates in the 70s.     Overnight Events:         Objective Data:  Last Recorded Vitals:  Vitals:    04/26/24 2055 04/27/24 0103 04/27/24 0416 04/27/24 0743   BP: 113/56 106/51 113/63 109/51   BP Location: Right arm Right arm Right arm    Patient Position: Lying Lying Lying    Pulse: 95 87 74 89   Resp: 20 18 18 20   Temp: 36.8 °C (98.2 °F) 36.3 °C (97.3 °F) 36.2 °C (97.2 °F) 36.6 °C (97.8 °F)   TempSrc: Temporal Temporal Temporal    SpO2: 99% 99% 100% 100%   Weight:   71.8 kg (158 lb 4.6 oz)    Height:           Last Labs:  Results from last 7 days   Lab Units 04/26/24  0422 04/24/24  0433 04/23/24  0429   WBC AUTO x10*3/uL 13.0* 14.1* 12.3*   HEMOGLOBIN g/dL 8.5* 7.5* 7.4*   HEMATOCRIT % 27.7* 25.6* 25.2*   PLATELETS AUTO x10*3/uL 163 176 181      Results from last 7 days   Lab Units 04/26/24  0422 04/24/24  0433 04/23/24  0429 04/22/24  0343 04/21/24  1023   SODIUM mmol/L 136 136 139   < > 135*   POTASSIUM mmol/L 5.2 4.4 4.3   < > 4.8   CHLORIDE " mmol/L 101 101 102   < > 98   CO2 mmol/L 25 27 33*   < > 31   BUN mg/dL 85* 61* 30*   < > 42*   CREATININE mg/dL 5.95* 5.34* 3.53*   < > 5.87*   CALCIUM mg/dL 8.6 8.6 8.5*   < > 8.8   PROTEIN TOTAL g/dL  --   --   --   --  6.3*   BILIRUBIN TOTAL mg/dL  --   --   --   --  0.4   ALK PHOS U/L  --   --   --   --  59   ALT U/L  --   --   --   --  14   AST U/L  --   --   --   --  12   GLUCOSE mg/dL 98 115* 134*   < > 112*    < > = values in this interval not displayed.      Results from last 7 days   Lab Units 04/26/24  0422 04/24/24  0433 04/23/24  0429   MAGNESIUM mg/dL 1.86 1.70 1.76     PTT - No results in last year.  1.5   16.9 _     TROPHS   Date/Time Value Ref Range Status   04/21/2024 11:11 AM 33 0 - 20 ng/L Final   04/21/2024 10:23 AM 38 0 - 20 ng/L Final   03/05/2024 11:03 AM 20 0 - 20 ng/L Final     BNP   Date/Time Value Ref Range Status   04/21/2024 10:23 AM 1,040 0 - 99 pg/mL Final   03/05/2024 10:20  0 - 99 pg/mL Final     HGBA1C   Date/Time Value Ref Range Status   12/10/2019 08:46 AM 6.1 % Final     Comment:          Diagnosis of Diabetes-Adults   Non-Diabetic: < or = 5.6%   Increased risk for developing diabetes: 5.7-6.4%   Diagnostic of diabetes: > or = 6.5%  .       Monitoring of Diabetes                Age (y)     Therapeutic Goal (%)   Adults:          >18           <7.0   Pediatrics:    13-18           <7.5                   7-12           <8.0                   0- 6            7.5-8.5   American Diabetes Association. Diabetes Care 33(S1), Jan 2010.       VLDL   Date/Time Value Ref Range Status   04/14/2023 08:08 AM 32 0 - 40 mg/dL Final   04/11/2022 08:48 AM 39 0 - 40 mg/dL Final   05/25/2021 11:09 AM 23 0 - 40 mg/dL Final      Last I/O:  I/O last 3 completed shifts:  In: 1200 (16.7 mL/kg) [P.O.:400; I.V.:800 (11.1 mL/kg)]  Out: 2004 (27.9 mL/kg) [Other:2000; Blood:4]  Weight: 71.8 kg     Past Cardiology Tests     Echo:  Transthoracic Echo (TTE) Complete 04/22/2024  Left ventricular systolic  "function is normal with a 60-65% estimated ejection fraction.   2. There is a moderate pleural effusion.   3. Mild mitral valve regurgitation.   4. Mild tricuspid regurgitation is visualized.   5. Mildly elevated RVSP.  Ejection Fractions:  No results found for: \"EF\"  Cath:  No results found for this or any previous visit from the past 1095 days.    Stress Test:  NM Stress test 4/24/24:  1. There is a small, mild intensity, partially reversible perfusion  defect in the apical and apical inferior walls concerning for  ischemia. Low risk sum difference score of 1.  2. Calculated ejection fraction of 69% without segmental wall motion  abnormality seen.    Cardiac Imaging:  No results found for this or any previous visit from the past 1095 days.      Inpatient Medications:  Scheduled medications   Medication Dose Route Frequency    apixaban  5 mg oral BID    aspirin  325 mg oral Daily    atorvastatin  40 mg oral Nightly    B complex-vitamin C-folic acid  1 capsule oral Daily    carvedilol  6.25 mg oral BID with meals    epoetin jennifer or biosimilar  5,000 Units subcutaneous Once per day on Monday Wednesday Friday    tiotropium  2 puff inhalation Daily    And    fluticasone furoate-vilanteroL  1 puff inhalation Daily    heparin  1,000 Units intra-catheter After Dialysis    heparin  1,000 Units intra-catheter After Dialysis    LORazepam  1 mg intravenous Once    methIMAzole  5 mg oral Daily    montelukast  10 mg oral Nightly    perflutren protein A microsphere  0.5 mL intravenous Once in imaging    [START ON 4/28/2024] predniSONE  20 mg oral Daily    Followed by    [START ON 5/1/2024] predniSONE  10 mg oral Daily    Followed by    [START ON 5/4/2024] predniSONE  5 mg oral Daily    sodium chloride  200 mL intravenous After Dialysis     PRN medications   Medication    ipratropium-albuteroL    oxygen    oxygen     Continuous Medications   Medication Dose Last Rate       Physical Exam:  Physical Exam  Vitals reviewed. "   Constitutional:       Appearance: Normal appearance.   HENT:      Head: Normocephalic.      Mouth/Throat:      Mouth: Mucous membranes are moist.   Cardiovascular:      Rate and Rhythm: Normal rate. Rhythm irregular.      Pulses: Normal pulses.      Heart sounds: Normal heart sounds.   Pulmonary:      Effort: Pulmonary effort is normal.      Breath sounds: No wheezing, rhonchi or rales.   Abdominal:      General: Bowel sounds are normal. There is no distension.      Palpations: Abdomen is soft.      Tenderness: There is no abdominal tenderness.   Musculoskeletal:      Cervical back: Normal range of motion.      Right lower leg: No edema.      Left lower leg: No edema.   Skin:     General: Skin is warm and dry.      Comments: Rt radial site dry and intact, no bleeding, hematoma or ecchymosis.    Neurological:      General: No focal deficit present.      Mental Status: He is alert and oriented to person, place, and time.   Psychiatric:         Mood and Affect: Mood normal.         Behavior: Behavior normal.           Assessment/Plan   1-acute hypoxic and hypercarbic respiratory failure, abnormal stress test-appears mostly to be heart failure although patient does have history of aspiration pneumonia and possibility needs to be ruled out.  We will check an echocardiogram and if LV function is preserved arrange for ischemic evaluation and continue to monitor on telemetry for A-fib burden to rule out any reversible cardiovascular cause of heart failure.  He will likely need to increase dialysis volume outpatient and I would leave it to the expert hands of his nephrologist.     Discussed with Dr. Levin.    4/23/24: Patient remains short of breath this morning despite 2 sessions of HD with 2L removed with each session. Echo with normal LV systolic function with EF 60-65%. Patient agreeable to proceed with stress test, will order.     4/24/24: Stress test with small, mild intensity, partially reversible perfusion defect  in the apical and apical inferior walls concerning for ischemia. Left heart catheterization procedure recommended, discussed results with patient and Dr. Feng, patient is agreeable to proceed with procedure. He had last dose of Apixaban today, will have to plan for procedure to be on Friday 4/26.    4/25/24: Denies any chest pain/pressure or SOB.  Seen while in HD.  Patient aware of plans for LHC tomorrow with Dr. Perez Hernandez.   4/27/24: s/p LHC with non-obstructive CAD. Denies chest pain or pressure, no shortness of breath. Continue on current cardiac medications. Resume Apixaban.     2-history of atrial flutter-seems to be in sinus rhythm but will monitor on telemetry as he is stating he is getting palpitations often.  Depending on burden of atrial arrhythmia if any may need another ablation or antiarrhythmic.  Unfortunately in the past though amiodarone did not work very well.  Continue anticoagulation.    4/23/24: Continues to feel palpitations at times, maintaining SR with rates in the 80s today. Continue oral anticoagulation with apixaban, no abnormal bleeding reported. Recommend 14 day Holter monitor at discharge to evaluate burden of Afib.    4/24/24: Patient noted to be back in Afib starting last evening, rates controlled in the 80-90s, continues to have occasional fluttering but reports not significant at this time. Will need to hold Apixaban for LHC procedure on Friday. Continue on carvedilol 6.25 mg BID.    4/25/24:  Apixaban remains on hold d/t plans for LHC tomorrow.  Continue carvedilol 6.25 mg BID.  4/27/24: Stable, rate controlled. Resume Apixaban this morning.     3. ESRD on HD Mon-Wed-Fri  Management per nephrology      Recommendations:  Okay for discharge from cardiology standpoint, continue current cardiac medications.   Resume Apixaban today  OP follow up will be arranged.    Code Status:  Full Code      Tracy Wagner, APRN-CNP

## 2024-04-27 NOTE — PROGRESS NOTES
Reason For Consult   Esrd     Interval History    Patient is doing   better seenon floor g hoping for discharge today I reviewed results of patient's left heart catheterization.  Renal report was not available.  Does not appear like patient was stented.          ROS  Denies chest pain, shortness of breath,  Nausea, vomiting , diarrhea, fever or chills.     No change in Past Medical History         I&O 24HR    Intake/Output Summary (Last 24 hours) at 4/27/2024 0846  Last data filed at 4/27/2024 0416  Gross per 24 hour   Intake 1200 ml   Output 2004 ml   Net -804 ml       Vitals 24HR  Heart Rate:  [74-96]   Temp:  [36.2 °C (97.2 °F)-36.8 °C (98.3 °F)]   Resp:  [17-20]   BP: (105-116)/(41-66)   Weight:  [71.8 kg (158 lb 4.6 oz)]   SpO2:  [97 %-100 %]     Results from last 7 days   Lab Units 04/26/24  0422 04/24/24 0433 04/23/24  0429   SODIUM mmol/L 136 136 139   POTASSIUM mmol/L 5.2 4.4 4.3   CHLORIDE mmol/L 101 101 102   CO2 mmol/L 25 27 33*   BUN mg/dL 85* 61* 30*   CREATININE mg/dL 5.95* 5.34* 3.53*   EGFR mL/min/1.73m*2 9* 11* 17*   GLUCOSE mg/dL 98 115* 134*   CALCIUM mg/dL 8.6 8.6 8.5*     Results from last 7 days   Lab Units 04/26/24  0422 04/24/24 0433 04/23/24  0429   WBC AUTO x10*3/uL 13.0* 14.1* 12.3*   HEMOGLOBIN g/dL 8.5* 7.5* 7.4*   HEMATOCRIT % 27.7* 25.6* 25.2*   PLATELETS AUTO x10*3/uL 163 176 181       Physical Exam  Constitutional:       Appearance: Normal appearance.   HENT:      Mouth/Throat:      Mouth: Mucous membranes are moist.   Eyes:      General: No scleral icterus.     Conjunctiva/sclera: Conjunctivae normal.   Cardiovascular:      Rate and Rhythm: Normal rate and regular rhythm.      Heart sounds: Normal heart sounds. No murmur heard.  Pulmonary:      Effort: Pulmonary effort is normal.      Breath sounds: Normal breath sounds.   Abdominal:      General: Abdomen is flat.      Palpations: Abdomen is soft.   Musculoskeletal:      Cervical back: Neck supple.   Skin:     General: Skin is  warm and dry.   Neurological:      Mental Status: He is alert and oriented to person, place, and time. Mental status is at baseline.   Psychiatric:         Mood and Affect: Mood normal.         Behavior: Behavior normal.         Relevant Results               Scheduled medications  [Held by provider] apixaban, 5 mg, oral, BID  aspirin, 325 mg, oral, Daily  atorvastatin, 40 mg, oral, Nightly  B complex-vitamin C-folic acid, 1 capsule, oral, Daily  carvedilol, 6.25 mg, oral, BID with meals  epoetin jennifer or biosimilar, 5,000 Units, subcutaneous, Once per day on Monday Wednesday Friday  tiotropium, 2 puff, inhalation, Daily   And  fluticasone furoate-vilanteroL, 1 puff, inhalation, Daily  heparin, 1,000 Units, intra-catheter, After Dialysis  heparin, 1,000 Units, intra-catheter, After Dialysis  LORazepam, 1 mg, intravenous, Once  methIMAzole, 5 mg, oral, Daily  montelukast, 10 mg, oral, Nightly  perflutren protein A microsphere, 0.5 mL, intravenous, Once in imaging  [START ON 4/28/2024] predniSONE, 20 mg, oral, Daily   Followed by  [START ON 5/1/2024] predniSONE, 10 mg, oral, Daily   Followed by  [START ON 5/4/2024] predniSONE, 5 mg, oral, Daily  sodium chloride, 200 mL, intravenous, After Dialysis      Continuous medications     PRN medications  PRN medications: ipratropium-albuteroL, oxygen, oxygen    Results for orders placed or performed during the hospital encounter of 04/21/24 (from the past 96 hour(s))   Electrocardiogram, 12-lead PRN ACS symptoms   Result Value Ref Range    Ventricular Rate 90 BPM    Atrial Rate 96 BPM    QRS Duration 104 ms    QT Interval 356 ms    QTC Calculation(Bazett) 435 ms    R Axis 84 degrees    T Axis 12 degrees    QRS Count 14 beats    Q Onset 215 ms    T Offset 393 ms    QTC Fredericia 407 ms   Basic Metabolic Panel   Result Value Ref Range    Glucose 115 (H) 74 - 99 mg/dL    Sodium 136 136 - 145 mmol/L    Potassium 4.4 3.5 - 5.3 mmol/L    Chloride 101 98 - 107 mmol/L    Bicarbonate  27 21 - 32 mmol/L    Anion Gap 12 10 - 20 mmol/L    Urea Nitrogen 61 (H) 6 - 23 mg/dL    Creatinine 5.34 (H) 0.50 - 1.30 mg/dL    eGFR 11 (L) >60 mL/min/1.73m*2    Calcium 8.6 8.6 - 10.3 mg/dL   CBC   Result Value Ref Range    WBC 14.1 (H) 4.4 - 11.3 x10*3/uL    nRBC 0.0 0.0 - 0.0 /100 WBCs    RBC 2.56 (L) 4.50 - 5.90 x10*6/uL    Hemoglobin 7.5 (L) 13.5 - 17.5 g/dL    Hematocrit 25.6 (L) 41.0 - 52.0 %     80 - 100 fL    MCH 29.3 26.0 - 34.0 pg    MCHC 29.3 (L) 32.0 - 36.0 g/dL    RDW 15.2 (H) 11.5 - 14.5 %    Platelets 176 150 - 450 x10*3/uL   Magnesium   Result Value Ref Range    Magnesium 1.70 1.60 - 2.40 mg/dL   CBC   Result Value Ref Range    WBC 13.0 (H) 4.4 - 11.3 x10*3/uL    nRBC 0.0 0.0 - 0.0 /100 WBCs    RBC 2.81 (L) 4.50 - 5.90 x10*6/uL    Hemoglobin 8.5 (L) 13.5 - 17.5 g/dL    Hematocrit 27.7 (L) 41.0 - 52.0 %    MCV 99 80 - 100 fL    MCH 30.2 26.0 - 34.0 pg    MCHC 30.7 (L) 32.0 - 36.0 g/dL    RDW 14.9 (H) 11.5 - 14.5 %    Platelets 163 150 - 450 x10*3/uL   Basic Metabolic Panel   Result Value Ref Range    Glucose 98 74 - 99 mg/dL    Sodium 136 136 - 145 mmol/L    Potassium 5.2 3.5 - 5.3 mmol/L    Chloride 101 98 - 107 mmol/L    Bicarbonate 25 21 - 32 mmol/L    Anion Gap 15 10 - 20 mmol/L    Urea Nitrogen 85 (H) 6 - 23 mg/dL    Creatinine 5.95 (H) 0.50 - 1.30 mg/dL    eGFR 9 (L) >60 mL/min/1.73m*2    Calcium 8.6 8.6 - 10.3 mg/dL   Magnesium   Result Value Ref Range    Magnesium 1.86 1.60 - 2.40 mg/dL         Assessment/Plan   This patient currently has cardiac telemetry ordered; if you would like to modify or discontinue the telemetry order, click here to go to the orders activity to modify/discontinue the order.           Assessment/Plan       End-stage renal disease N18.6  Hypertension I12  Dependence on hemodialysis Z99.2  Acute on chronic hypoxemic  respiratory failure J96.21, J96.22  Atrial fibrillation chronic  Anemia of chronic kidney disease     RECOMMENDATIONS:    Will perform dialysis  per Monday Wednesday Friday schedule while inpatient,    Acute respiratory failure is responding to volume management.  Remains in respiratory failure at baseline patient is able to be weaned off oxygen completely  Established patient's dry weight, check floor scale weight after dialysis on April 25 discussed with bedside dialysis nurse    Likely dry weight is between 77 kg   Hypertension is well-controlled.,  Hemoglobin is quite low at this point resume erythropoietin stimulating agents   Patient remains on Coreg and presently getting Rocephin   Renal multivitamin initiated    No objection from renal standpoint for patient to be discharged

## 2024-04-27 NOTE — PROGRESS NOTES
Home O2 Evaluation: Completed    SpO2 on room air at rest:   84  %    SpO2 on room air with exertion:     %    SpO2 on oxygen at rest:     %    SpO2 on oxygen with exertion:   93 % on 5L NC    SpO2 on 4L/min O2 with exertion:   88 % on 4L NC    Ambulation distance:    212  ft    Recommended O2 device: Recommend home settings at 5L NC with exertion    Recommended O2 L/min: 5L    Recommended FiO2 %:   Respiratory Therapy Note

## 2024-04-27 NOTE — NURSING NOTE
Discharge instructions reviewed with patient and wife at bedside. Medication list reviewed. Patient understands how to followup.   IV removed from patient.   All questions and concerns answered at this time.   Patient safely discharged in wheelchair with all belongings.

## 2024-04-27 NOTE — CARE PLAN
The patient's goals for the shift include comfort and rest.      The clinical goals for the shift include Patient will remain hemodynamically stable throughout shift. Patient will remain free from falls and injury during shift.

## 2024-04-28 ENCOUNTER — HOME CARE VISIT (OUTPATIENT)
Dept: HOME HEALTH SERVICES | Facility: HOME HEALTH | Age: 74
End: 2024-04-28
Payer: MEDICARE

## 2024-04-28 ENCOUNTER — APPOINTMENT (OUTPATIENT)
Dept: CARE COORDINATION | Age: 74
End: 2024-04-28
Payer: MEDICARE

## 2024-04-28 VITALS
HEART RATE: 77 BPM | DIASTOLIC BLOOD PRESSURE: 55 MMHG | OXYGEN SATURATION: 98 % | SYSTOLIC BLOOD PRESSURE: 110 MMHG | TEMPERATURE: 97.8 F | RESPIRATION RATE: 18 BRPM

## 2024-04-28 PROCEDURE — 1090000002 HH PPS REVENUE DEBIT

## 2024-04-28 PROCEDURE — 0023 HH SOC

## 2024-04-28 PROCEDURE — 169592 NO-PAY CLAIM PROCEDURE

## 2024-04-28 PROCEDURE — 1090000001 HH PPS REVENUE CREDIT

## 2024-04-28 PROCEDURE — G0299 HHS/HOSPICE OF RN EA 15 MIN: HCPCS | Mod: HHH

## 2024-04-28 ASSESSMENT — ENCOUNTER SYMPTOMS
DRY SKIN: 1
MUSCLE WEAKNESS: 1
APPETITE LEVEL: GOOD
LOWEST PAIN SEVERITY IN PAST 24 HOURS: 0/10
DYSPNEA ACTIVITY LEVEL: AFTER AMBULATING MORE THAN 20 FT
PAIN: PATIENT STATES THAT HE HAS NOT HAD ANY PAIN IN THE LAST 24 HOURS.
DENIES PAIN: 1
FATIGUES EASILY: 1
SHORTNESS OF BREATH: 1
HIGHEST PAIN SEVERITY IN PAST 24 HOURS: 0/10
PAIN SEVERITY GOAL: 0/10
DYSPNEA ON EXERTION: 1
CHANGE IN APPETITE: UNCHANGED
PERSON REPORTING PAIN: PATIENT
SUBJECTIVE PAIN PROGRESSION: UNCHANGED

## 2024-04-28 ASSESSMENT — ACTIVITIES OF DAILY LIVING (ADL)
CURRENT_FUNCTION: ONE PERSON
OASIS_M1830: 03
ENTERING_EXITING_HOME: ONE PERSON
AMBULATION ASSISTANCE: ONE PERSON

## 2024-04-28 NOTE — PROGRESS NOTES
Called for initial [facility] call. PT/OT is currently in home and family requesting to reschedule tomorrow at 1400. Darlene planned visit at 1500 to be set up on D2S.    Patient's Address:   [address] [place] [id number]  **  If this is not the address patient will receive services - alert team and address in EMR**       Patient Contacts:  Extended Emergency Contact Information  Primary Emergency Contact: Cori Ward  Mobile Phone: [phone]  Relation: Spouse  Preferred language: English   needed? No                                Patient's Preferred Phone: [phone]  Patient's E-mail: [email or web address]

## 2024-04-28 NOTE — PROGRESS NOTES
Daily Call Note:     Pt Education: n/a   Barriers: n/a  Topics for Daily Review: n/a  Pt demonstrates clear understanding: Yes    Daily Weight:  There were no vitals filed for this visit.   Last 3 Weights:  Wt Readings from Last 7 Encounters:   04/27/24 71.8 kg (158 lb 4.6 oz)   04/04/24 79.4 kg (175 lb)   03/28/24 83.9 kg (185 lb)   03/06/24 77.3 kg (170 lb 6.4 oz)   12/06/23 81.3 kg (179 lb 3.2 oz)   11/15/23 80.7 kg (178 lb)   10/04/23 78 kg (172 lb)       Masimo Device: Yes   Masimo Clinical Impression: oxygen titration and HR monitoring    Virtual Visits--Scheduled (Most Recent Date at Top)  Follow up Appointments  Recent Visits  Date Type Provider Dept   04/04/24 Office Visit Leandro Bassett DO [id number]   Showing recent visits within past 30 days and meeting all other requirements  Future Appointments  No visits were found meeting these conditions.  Showing future appointments within next 90 days and meeting all other requirements       Frequency of RN Calls & Virtual Visits per Team Agreement: Healthy at Home Frequency: Daily    Medication issues Addressed (what was done): n/a    Follow up appointments scheduled by [facility] Staff: n/a  Referrals made by [facility] staff: n/a

## 2024-04-29 ENCOUNTER — TELEPHONE (OUTPATIENT)
Dept: CARDIOLOGY | Facility: CLINIC | Age: 74
End: 2024-04-29

## 2024-04-29 ENCOUNTER — TELEPHONE (OUTPATIENT)
Dept: PRIMARY CARE | Facility: CLINIC | Age: 74
End: 2024-04-29

## 2024-04-29 ENCOUNTER — PATIENT OUTREACH (OUTPATIENT)
Dept: PRIMARY CARE | Facility: CLINIC | Age: 74
End: 2024-04-29

## 2024-04-29 ENCOUNTER — TELEMEDICINE (OUTPATIENT)
Dept: PHARMACY | Facility: HOSPITAL | Age: 74
End: 2024-04-29
Payer: MEDICARE

## 2024-04-29 ENCOUNTER — PATIENT OUTREACH (OUTPATIENT)
Dept: HOME HEALTH SERVICES | Age: 74
End: 2024-04-29

## 2024-04-29 DIAGNOSIS — D51.3 OTHER DIETARY VITAMIN B12 DEFICIENCY ANEMIA: ICD-10-CM

## 2024-04-29 DIAGNOSIS — I48.92 ATRIAL FLUTTER, PAROXYSMAL (MULTI): Primary | ICD-10-CM

## 2024-04-29 DIAGNOSIS — I50.9 CONGESTIVE HEART FAILURE, UNSPECIFIED HF CHRONICITY, UNSPECIFIED HEART FAILURE TYPE (MULTI): ICD-10-CM

## 2024-04-29 DIAGNOSIS — J43.2 CENTRILOBULAR EMPHYSEMA (MULTI): ICD-10-CM

## 2024-04-29 DIAGNOSIS — J44.9 COPD, SEVERE (MULTI): ICD-10-CM

## 2024-04-29 DIAGNOSIS — I48.92 ATRIAL FLUTTER, PAROXYSMAL (MULTI): ICD-10-CM

## 2024-04-29 PROCEDURE — 1090000002 HH PPS REVENUE DEBIT

## 2024-04-29 PROCEDURE — 1090000001 HH PPS REVENUE CREDIT

## 2024-04-29 RX ORDER — ALBUTEROL SULFATE 90 UG/1
2 AEROSOL, METERED RESPIRATORY (INHALATION) EVERY 6 HOURS PRN
COMMUNITY
End: 2024-05-15 | Stop reason: SDUPTHER

## 2024-04-29 RX ORDER — FLUTICASONE FUROATE, UMECLIDINIUM BROMIDE AND VILANTEROL TRIFENATATE 100; 62.5; 25 UG/1; UG/1; UG/1
1 POWDER RESPIRATORY (INHALATION)
Qty: 180 EACH | Refills: 3 | Status: SHIPPED | OUTPATIENT
Start: 2024-04-29

## 2024-04-29 RX ORDER — ACETAMINOPHEN, DEXTROMETHORPHAN HBR, DOXYLAMINE SUCCINATE, PHENYLEPHRINE HCL 650; 20; 12.5; 1 MG/30ML; MG/30ML; MG/30ML; MG/30ML
1 SOLUTION ORAL DAILY
Qty: 90 TABLET | Refills: 0 | Status: SHIPPED | OUTPATIENT
Start: 2024-04-29

## 2024-04-29 NOTE — TELEPHONE ENCOUNTER
----- Message from ALONDRA Morrissey-CNP sent at 4/27/2024 11:14 AM EDT -----  Could you please schedule hospital follow up with CHARLINE in 2 weeks. Thanks    Scheduled 05/13/2024 at 3:00.-MR Louis

## 2024-04-29 NOTE — TELEPHONE ENCOUNTER
Med Refill   cyanocobalamin, vitamin B-12, (Vitamin B-12) 1,000 mcg tablet extended release [[id number]]      [id number] - [place], [place] - [address]  [address], [place] [address]  Phone: [phone]  Fax: [phone]

## 2024-04-29 NOTE — PROGRESS NOTES
Initial [facility] call completed with Dr London Hicks and Zechariah from pharmacy patient states he is feeling better since being discharged from the hospital patient never gets edema in his legs patient states that his dry weight is 77 kg patient is on home hemo  dialysis his wife takes care of all his process labs meds ect patient is on 5l nc he would like to get down to 2l nc patient advised to make sure that he is above 88 % and to give himself time to acclimate to each level as he titrates down patient was not started on any new medications patient has good follow up scheduled patients meds reviewed patient should still qualify for his eliquis and trelegy to be covered by the Prescription assistance program patient will supply needed documentation no other medication needs questions and concerns addressed Next [facility] scheduled 5/6 @ 1400

## 2024-04-29 NOTE — TELEPHONE ENCOUNTER
Med Refill   cyanocobalamin, vitamin B-12, (Vitamin B-12) 1,000 mcg tablet extended release [[id number]]     [id number] - [place], [place] - [address]  [address], [place] [address]  Phone: [phone]  Fax: [phone]

## 2024-04-29 NOTE — PROGRESS NOTES
Discharge Facility: [facility]  Discharge Diagnosis: COPD exacerbation   Admission Date: 21 Apr 24  Discharge Date: 27 Apr 24    PCP Appointment Date: Tasked to office for scheduling.   Specialist Appointment Date: N/A  Hospital Encounter and Summary: Linked    See discharge assessment below for further details     Engagement  Call Start Time: 0909 (4/29/2024  9:19 AM)    Medications  Medications reviewed with patient/caregiver?: Yes (4/29/2024  9:19 AM)  Is the patient having any side effects they believe may be caused by any medication additions or changes?: No (4/29/2024  9:19 AM)  Does the patient have all medications ordered at discharge?: Yes (4/29/2024  9:19 AM)  Prescription Comments: None (4/29/2024  9:19 AM)  Is the patient taking all medications as directed (includes completed medication regime)?: Yes (4/29/2024  9:19 AM)  Medication Comments: None (4/29/2024  9:19 AM)    Appointments  Does the patient have a primary care provider?: Yes (tasked to office for scheduling.) (4/29/2024  9:19 AM)  Has the patient kept scheduled appointments due by today?: Yes (4/29/2024  9:19 AM)    Self Management  What is the home health agency?: [facility], set to see patient today or tomorrow (4/29/2024  9:19 AM)  Has home health visited the patient within 72 hours of discharge?: Call prior to 72 hours (4/29/2024  9:19 AM)  What Durable Medical Equipment (DME) was ordered?: Oxygen, pt states he has plenty at home at this time. (4/29/2024  9:19 AM)    Patient Teaching  Does the patient have access to their discharge instructions?: Yes (4/29/2024  9:19 AM)  Care Management Interventions: Reviewed instructions with patient (4/29/2024  9:19 AM)  What is the patient's perception of their health status since discharge?: Improving (4/29/2024  9:19 AM)  Is the patient/caregiver able to teach back the hierarchy of who to call/visit for symptoms/problems? PCP, Specialist, Home Health nurse, Urgent Care, ED, 911: Yes (4/29/2024  9:19  AM)  Patient/Caregiver Education Comments: None (4/29/2024  9:19 AM)    Wrap Up  Wrap Up Additional Comments: None (4/29/2024  9:19 AM)  Call End Time: 0919 (4/29/2024  9:19 AM)     Pt grateful for outreach call and is agreeable to being contacted in 2 wks to see how he is doing.

## 2024-04-29 NOTE — PROGRESS NOTES
Pharmacy Post-Discharge Visit    Renard Ward is a 73 y.o. male was referred to Clinical Pharmacy Team to complete a post-discharge medication optimization and monitoring visit.  The patient was referred for their COPD, Afib and CHF management. He is also currently enrolled in our [facility].     Admission Date: 4/21/24  Discharge Date: 4/27/24    Referring Provider: Selvin Salamanca DO  PCP: Leandro Bassett DO       Subjective   Allergies   Allergen Reactions    Penicillins Anaphylaxis and Hives       [id number] - [place], [place] - [address] [facility] [address]  [address] [facility] [address]  [facility] [address]  Phone: [phone] Fax: [phone]    [facility] - [place], [place] - [facility] [address]  [address]  [address]  [facility] [address]  Phone: [phone] Fax: [phone]      Medication System Management:  Affordability/Accessibility: Try to enroll into PAP  Adherence/Organization: no concerns       Social History     Social History Narrative    Not on file        Notable Medication changes following discharge:  Start: nothing new  Stop: none  Change: none    HPI  CHF ASSESSMENT  Staging:  Most recent ejection fraction: 60-65%  NYHA Stage: III  ACC/AHA Stage: C    Symptom Assessment:  Weight changes/edema?: No  Dyspnea?: With exertion  Dizziness/syncope/palpitations?: No    Current Regimen:  ARNI/ACEi/ARB: No  Beta Blocker: Yes - carvedilol  MRA: No  SGLT2i: No    Other therapy:  Eliquis   Torsemide     Secondary Prevention:  The ASCVD Risk score (Eugenia ROE, et al., 2019) failed to calculate for the following reasons:    The valid total cholesterol range is 130 to 320 mg/dL    Aspirin 81mg? no  Statin?: Yes - atorvastatin   HTN?: No     PULMONARY ASSESSMENT  Patient has been diagnosed with: COPD  does see a pulmonologist  Last visit: n/a    Current Regimen:  Trelegy   Albuterol     Appropriate Inhaler Technique? yes  How many times per week do you use your rescue inhaler? Has  not had to use since being home     Symptom Assessment:  Current symptoms: dyspnea and fatigue  Symptoms are improved    Exacerbation Hx:  When was your last hospitalization for an exacerbation? This past admission   When was the last time you were treated with antibiotics and/or steroids? This past admission - completed both while inpatient      Immunization History:  Influenza: Date [10/24/2023]  PCV13: Date [4/13/2022]  PPSV23: Date [n/a]  PCV20: Date [n/a]  COVID: Date [12/7/2021]  RSV: Date [n/a]    Smoking history:  He quit smoking     Review of Systems        Objective     There were no vitals taken for this visit.   BP Readings from Last 4 Encounters:   04/28/24 110/55   04/27/24 108/61   04/04/24 130/62   03/28/24 148/62      There were no vitals filed for this visit.     LAB  Lab Results   Component Value Date    BILITOT 0.4 04/21/2024    CALCIUM 8.6 04/26/2024    CO2 25 04/26/2024     04/26/2024    CREATININE 5.95 (H) 04/26/2024    GLUCOSE 98 04/26/2024    ALKPHOS 59 04/21/2024    K 5.2 04/26/2024    PROT 6.3 (L) 04/21/2024     04/26/2024    AST 12 04/21/2024    ALT 14 04/21/2024    BUN 85 (H) 04/26/2024    ANIONGAP 15 04/26/2024    MG 1.86 04/26/2024    PHOS CANCELED 08/06/2023    LDH 99 03/17/2020    ALBUMIN 4.0 04/21/2024    LIPASE 46 04/21/2024    GFRF CANCELED 08/06/2023    GFRMALE 9 (A) 09/21/2023     Lab Results   Component Value Date    TRIG 161 (H) 04/14/2023    CHOL 112 04/14/2023    HDL 26.6 (A) 04/14/2023     Lab Results   Component Value Date    HGBA1C 6.1 12/10/2019         Current Outpatient Medications   Medication Instructions    atorvastatin (LIPITOR) 40 mg, oral, Nightly    carvedilol (Coreg) 6.25 mg tablet 1 tablet, oral, 2 times daily with meals    cyanocobalamin (vitamin B-12) (VITAMIN B-12) 1,000 mcg, oral, Daily    Eliquis 5 mg, oral, 2 times daily    ergocalciferol (VITAMIN D-2) 1.25 mg, oral, Once Weekly    famotidine (PEPCID) 40 mg, oral, 2 times daily     fluticasone-umeclidin-vilanter (Trelegy Ellipta) 100-62.5-25 mcg blister with device 1 puff, inhalation, Daily RT    methIMAzole (TAPAZOLE) 5 mg, oral, Daily    montelukast (SINGULAIR) 10 mg, oral, Nightly    Mary Grace-Darryl Rx 1- mg-mg-mcg tablet 1 tablet, oral, Daily    torsemide (DEMADEX) 20 mg, oral, 2 times daily        HISTORICAL PHARMACOTHERAPY  N/a    DRUG INTERACTIONS  None at time of review      Assessment/Plan   Problem List Items Addressed This Visit       Atrial flutter, paroxysmal (Multi)    COPD, severe (Multi)     Other Visit Diagnoses       Congestive heart failure, unspecified HF chronicity, unspecified heart failure type (Multi)              Afib  No chest pain or SOB  No feelings of palpitations  Continue with coreg and eliquis   Will try to enroll into PAP for eliquis   COPD  Completed all antibiotics and steroids while inpatient  Breathing has been fine since being home  Still wearing 5L continuously   Did not want to be set up on hallie but has his own pulse ox and is checking it regularly to report to nursing so we can try to wean back down to baseline   Continue trelegy daily (will try to enroll with PAP)  Albuterol as needed   CHF  Current GDMT --> carvedilol   Not able to start on others due to kidney function  Does home dialysis 2 days a week   Continue torsemide   Also taking renal cap daily   Continue daily weights and BP's and keep log     Medications to enroll into PAP:  Eliquis 5mg  Trelegy 100-62.5-25mcg   -confirmed does still file taxes jointly so going to email me most recent 9249 form for enrollment     Follow Up: 1 week     Continue all meds under the continuation of care with the referring provider and clinical pharmacy team.    Zechariah Williamson, Meghan     Verbal consent to manage patient's drug therapy was obtained from the patient. They were informed they may decline to participate or withdraw from participation in pharmacy services at any time.

## 2024-04-30 ENCOUNTER — PATIENT OUTREACH (OUTPATIENT)
Dept: HOME HEALTH SERVICES | Age: 74
End: 2024-04-30
Payer: MEDICARE

## 2024-04-30 ENCOUNTER — HOME CARE VISIT (OUTPATIENT)
Dept: HOME HEALTH SERVICES | Facility: HOME HEALTH | Age: 74
End: 2024-04-30
Payer: MEDICARE

## 2024-04-30 VITALS
HEART RATE: 78 BPM | OXYGEN SATURATION: 98 % | TEMPERATURE: 96.8 F | SYSTOLIC BLOOD PRESSURE: 118 MMHG | DIASTOLIC BLOOD PRESSURE: 46 MMHG | RESPIRATION RATE: 20 BRPM

## 2024-04-30 VITALS — OXYGEN SATURATION: 95 % | DIASTOLIC BLOOD PRESSURE: 49 MMHG | SYSTOLIC BLOOD PRESSURE: 109 MMHG | HEART RATE: 75 BPM

## 2024-04-30 PROCEDURE — 1090000002 HH PPS REVENUE DEBIT

## 2024-04-30 PROCEDURE — RXMED WILLOW AMBULATORY MEDICATION CHARGE

## 2024-04-30 PROCEDURE — 1090000001 HH PPS REVENUE CREDIT

## 2024-04-30 PROCEDURE — G0151 HHCP-SERV OF PT,EA 15 MIN: HCPCS | Mod: HHH

## 2024-04-30 SDOH — ECONOMIC STABILITY: HOUSING INSECURITY: EVIDENCE OF SMOKING MATERIAL: 0

## 2024-04-30 SDOH — HEALTH STABILITY: PHYSICAL HEALTH
EXERCISE COMMENTS: ACH.  PATIENT REQUIRED VERBAL AND VISUAL CUING FOR PROPER EXECUTION OF EXERCISES.  PT HAS BEEN PROVIDED WRITTEN HOME EX HANDOUTS.

## 2024-04-30 SDOH — HEALTH STABILITY: PHYSICAL HEALTH: EXERCISE TYPE: BLE STRENGTHENING EXERCISES

## 2024-04-30 SDOH — HEALTH STABILITY: PHYSICAL HEALTH
EXERCISE COMMENTS: INSTRUCTION AND RETURN DEMONSTRATION OF THERAPEUTIC EXERCISES INCLUDING SEATED KNEE EXTENSIONS, SEATED MARCHING, SEATED HEEL AND TOE RAISES, SEATED BLUE THERABAND HIP ABDUCTION AND KNEE FLEXION, AND SEATED PILLOW SQUEEZE HIP ADDUCTION 1 SET 10 REPS E

## 2024-04-30 SDOH — HEALTH STABILITY: MENTAL HEALTH: SMOKING IN HOME: 0

## 2024-04-30 ASSESSMENT — PAIN SCALES - PAIN ASSESSMENT IN ADVANCED DEMENTIA (PAINAD)
BODYLANGUAGE: 0 - RELAXED.
TOTALSCORE: 0
NEGVOCALIZATION: 0 - NONE.
BREATHING: 0
NEGVOCALIZATION: 0
BODYLANGUAGE: 0
FACIALEXPRESSION: 0 - SMILING OR INEXPRESSIVE.
CONSOLABILITY: 0 - NO NEED TO CONSOLE.
CONSOLABILITY: 0
FACIALEXPRESSION: 0

## 2024-04-30 ASSESSMENT — ACTIVITIES OF DAILY LIVING (ADL)
AMBULATION ASSISTANCE ON FLAT SURFACES: 1
AMBULATION_DISTANCE/DURATION_TOLERATED: 40 FT

## 2024-04-30 ASSESSMENT — ENCOUNTER SYMPTOMS
OCCASIONAL FEELINGS OF UNSTEADINESS: 1
HIGHEST PAIN SEVERITY IN PAST 24 HOURS: 0/10
DENIES PAIN: 1
PERSON REPORTING PAIN: PATIENT

## 2024-04-30 NOTE — PROGRESS NOTES
Daily call-Info sent to Zechariah yesterday, approved and pt to have meds delivered Thursday. Currently on dialysis treatment. 109/49 BP, HR 75. Pt is currently on 4-5 L NC, hoping to titrate near end of week. Pulse ox currently in mid to upper 90's.    [facility] Transitions Assessment    Patient's Address:   [address] [place], [place] [address]  **  If this is not the address patient will receive services - alert team and address in EMR**       Patient Contacts:  Extended Emergency Contact Information  Primary Emergency Contact: Cori Ward  Mobile Phone: [phone]  Relation: Spouse  Preferred language: English   needed? No                                Patient's Preferred Phone: [phone]  Patient's E-mail: [email or web address]

## 2024-04-30 NOTE — HOME HEALTH
Patient is on home hemodialysis and he dialyzes from 12:30 to 4 pm each day. He prefers appointments before noon if possible.

## 2024-05-01 ENCOUNTER — PHARMACY VISIT (OUTPATIENT)
Dept: PHARMACY | Facility: CLINIC | Age: 74
End: 2024-05-01
Payer: MEDICARE

## 2024-05-01 ENCOUNTER — HOME CARE VISIT (OUTPATIENT)
Dept: HOME HEALTH SERVICES | Facility: HOME HEALTH | Age: 74
End: 2024-05-01
Payer: MEDICARE

## 2024-05-01 ENCOUNTER — PATIENT OUTREACH (OUTPATIENT)
Dept: HOME HEALTH SERVICES | Age: 74
End: 2024-05-01
Payer: MEDICARE

## 2024-05-01 VITALS — BODY MASS INDEX: 21.47 KG/M2 | OXYGEN SATURATION: 98 % | WEIGHT: 167.33 LBS

## 2024-05-01 PROCEDURE — 1090000001 HH PPS REVENUE CREDIT

## 2024-05-01 PROCEDURE — 1090000002 HH PPS REVENUE DEBIT

## 2024-05-01 PROCEDURE — G0152 HHCP-SERV OF OT,EA 15 MIN: HCPCS | Mod: HHH

## 2024-05-01 ASSESSMENT — ENCOUNTER SYMPTOMS
DENIES PAIN: 1
PERSON REPORTING PAIN: PATIENT

## 2024-05-01 NOTE — PROGRESS NOTES
Daily call with pt. Pt Is now on 6L NC and planning to wean down later today (he could not remember when he turned his O2 from 5L to 6L). Pulse ox has been stable and has not gone below 97%. Wt today is 75.9 kg. BP and HR not completed yet today. Pt states he feesl well today and today is his off day from dialysis.     [facility] Transitions Assessment    Patient's Address:   [address] [place] [address]  **  If this is not the address patient will receive services - alert team and address in EMR**       Patient Contacts:  Extended Emergency Contact Information  Primary Emergency Contact: Cori Ward  Mobile Phone: [phone]  Relation: Spouse  Preferred language: English   needed? No                                Patient's Preferred Phone: [phone]  Patient's E-mail: [email or web address]

## 2024-05-02 PROCEDURE — 1090000002 HH PPS REVENUE DEBIT

## 2024-05-02 PROCEDURE — 1090000001 HH PPS REVENUE CREDIT

## 2024-05-02 SDOH — ECONOMIC STABILITY: HOUSING INSECURITY: EVIDENCE OF SMOKING MATERIAL: 0

## 2024-05-02 SDOH — HEALTH STABILITY: MENTAL HEALTH: SMOKING IN HOME: 0

## 2024-05-02 ASSESSMENT — ENCOUNTER SYMPTOMS
AGGRESSION WITHIN DEFINED LIMITS: 1
ANGER WITHIN DEFINED LIMITS: 1

## 2024-05-03 ENCOUNTER — HOME CARE VISIT (OUTPATIENT)
Dept: HOME HEALTH SERVICES | Facility: HOME HEALTH | Age: 74
End: 2024-05-03
Payer: MEDICARE

## 2024-05-03 ENCOUNTER — PATIENT OUTREACH (OUTPATIENT)
Dept: HOME HEALTH SERVICES | Age: 74
End: 2024-05-03
Payer: MEDICARE

## 2024-05-03 VITALS
OXYGEN SATURATION: 98 % | RESPIRATION RATE: 18 BRPM | TEMPERATURE: 97.1 F | DIASTOLIC BLOOD PRESSURE: 54 MMHG | SYSTOLIC BLOOD PRESSURE: 124 MMHG | HEART RATE: 84 BPM

## 2024-05-03 VITALS
HEART RATE: 72 BPM | BODY MASS INDEX: 21.43 KG/M2 | DIASTOLIC BLOOD PRESSURE: 44 MMHG | OXYGEN SATURATION: 95 % | SYSTOLIC BLOOD PRESSURE: 108 MMHG | WEIGHT: 167 LBS

## 2024-05-03 PROBLEM — D68.9 COAGULATION DISORDER (MULTI): Status: ACTIVE | Noted: 2018-11-28

## 2024-05-03 PROBLEM — R06.09 DYSPNEA ON EXERTION: Status: ACTIVE | Noted: 2024-05-03

## 2024-05-03 PROCEDURE — 1090000001 HH PPS REVENUE CREDIT

## 2024-05-03 PROCEDURE — 1090000002 HH PPS REVENUE DEBIT

## 2024-05-03 PROCEDURE — G0151 HHCP-SERV OF PT,EA 15 MIN: HCPCS | Mod: HHH

## 2024-05-03 SDOH — HEALTH STABILITY: PHYSICAL HEALTH: EXERCISE TYPE: TRUNK AND BLE STRENGTHENING

## 2024-05-03 SDOH — HEALTH STABILITY: PHYSICAL HEALTH
EXERCISE COMMENTS: PERFORMED 3:00 MINS SEATED PEDALLING WARM-UP FOLLOWED BY INSTRUCTION AND RETURN DEMONSTRATION OF THERAPUETIC EXERCISES INCLUDING SEATED KNEE EXTENSIONS, SEATED MARCHING, SEATED HEEL AND TOE RAISES, SEATED BLUE THERABAND HIP ABDUCTION AND KNEE FLEXION

## 2024-05-03 SDOH — HEALTH STABILITY: PHYSICAL HEALTH
EXERCISE COMMENTS: , AND SEATED PILLOW SQUEEZE HIP ADDUCTION 2 SET 10 REPS EACH.  PATIENT REQUIRED VERBAL AND VISUAL CUING FOR PROPER EXECUTION OF EXERCISES.  PT HAS BEEN PROVIDED WRITTEN HOME EX HANDOUTS.

## 2024-05-03 ASSESSMENT — ENCOUNTER SYMPTOMS
HIGHEST PAIN SEVERITY IN PAST 24 HOURS: 0/10
LOWEST PAIN SEVERITY IN PAST 24 HOURS: 0/10
PAIN SEVERITY GOAL: 0/10
DENIES PAIN: 1
PERSON REPORTING PAIN: PATIENT

## 2024-05-03 ASSESSMENT — PAIN SCALES - PAIN ASSESSMENT IN ADVANCED DEMENTIA (PAINAD)
FACIALEXPRESSION: 0 - SMILING OR INEXPRESSIVE.
FACIALEXPRESSION: 0
CONSOLABILITY: 0
NEGVOCALIZATION: 0
NEGVOCALIZATION: 0 - NONE.
BODYLANGUAGE: 0
TOTALSCORE: 0
CONSOLABILITY: 0 - NO NEED TO CONSOLE.
BREATHING: 0
BODYLANGUAGE: 0 - RELAXED.

## 2024-05-03 ASSESSMENT — ACTIVITIES OF DAILY LIVING (ADL): AMBULATION ASSISTANCE ON FLAT SURFACES: 1

## 2024-05-03 NOTE — PROGRESS NOTES
Daily Call Note:   Spoke to patient, he is doing good. On HD now, he gets home HD 4x/week. States he came home from hospital on 5L, removed the other day completely and went down to 89% on room air, has since weaned himself to 3L where he remains. Recommended staying at 3L for now before further weaning.   Dry weight 77kg. Weight today pre HD 75.9, he states he will take an additional 1000 off to go into the weekend. Denies any new symptoms and verbalizes understanding of identifying worsening symptoms.   Reports bp and HR.  Offered to change call freq around HD, he prefers to be called while on the machine.  Next call reviewed.  Pt Education: POC  Barriers: na  Topics for Daily Review: POC  Pt demonstrates clear understanding: Yes    Daily Weight:  Vitals:    05/03/24 1532   Weight: 75.8 kg (167 lb)      Last 3 Weights:  Wt Readings from Last 7 Encounters:   05/03/24 75.8 kg (167 lb)   05/01/24 75.9 kg (167 lb 5.3 oz)   04/27/24 71.8 kg (158 lb 4.6 oz)   04/04/24 79.4 kg (175 lb)   03/28/24 83.9 kg (185 lb)   03/06/24 77.3 kg (170 lb 6.4 oz)   12/06/23 81.3 kg (179 lb 3.2 oz)       Masimo Device: No   Masimo Clinical Impression: na    Virtual Visits--Scheduled (Most Recent Date at Top)  Follow up Appointments  Recent Visits  Date Type Provider Dept   04/04/24 Office Visit Leandro Bassett DO [id number]   Showing recent visits within past 30 days and meeting all other requirements  Future Appointments  No visits were found meeting these conditions.  Showing future appointments within next 90 days and meeting all other requirements       Frequency of RN Calls & Virtual Visits per Team Agreement: [facility] Frequency: Daily    Medication issues Addressed (what was done): na    Follow up appointments scheduled by [facility] Staff: na  Referrals made by [facility] staff: jerica

## 2024-05-04 ENCOUNTER — PATIENT OUTREACH (OUTPATIENT)
Dept: CARE COORDINATION | Age: 74
End: 2024-05-04
Payer: MEDICARE

## 2024-05-04 VITALS — BODY MASS INDEX: 21.16 KG/M2 | OXYGEN SATURATION: 99 % | WEIGHT: 164.9 LBS

## 2024-05-04 PROCEDURE — 1090000001 HH PPS REVENUE CREDIT

## 2024-05-04 PROCEDURE — 1090000002 HH PPS REVENUE DEBIT

## 2024-05-04 NOTE — PROGRESS NOTES
Daily Call Note:   Pt stated today he was feeling better. Denies symptoms, such as SOB or edema. Wearing oxygen 4 liters right now, is in the process of weaning. O2 sat 99% he stated today. Weight down today. Remains on dialysis 4 days a week he stated, usually M-T-TH-F. No further questions or concerns.        Topics for Daily Review: weaning O2, weight.  Pt demonstrates clear understanding: Yes    Daily VS:  Wt 74.8 kg (164 lb 14.5 oz)   SpO2 99%   BMI 21.16 kg/m²        Last 3 Weights:  Wt Readings from Last 7 Encounters:   05/03/24 75.8 kg (167 lb)   05/01/24 75.9 kg (167 lb 5.3 oz)   04/27/24 71.8 kg (158 lb 4.6 oz)   04/04/24 79.4 kg (175 lb)   03/28/24 83.9 kg (185 lb)   03/06/24 77.3 kg (170 lb 6.4 oz)   12/06/23 81.3 kg (179 lb 3.2 oz)       Masimo Device: no    Virtual Visits--Scheduled (Most Recent Date at Top)  Follow up Appointments  Recent Visits  Date Type Provider Dept   04/04/24 Office Visit Leandro Bassett DO [id number]   Showing recent visits within past 30 days and meeting all other requirements  Future Appointments  Date Type Provider Dept   05/06/24 Appointment [facility]   Showing future appointments within next 90 days and meeting all other requirements       Frequency of RN Calls & Virtual Visits per Team Agreement: [facility] Frequency: Daily      Follow up appointments scheduled by [facility] Staff: [facility] 5/6 @1400

## 2024-05-05 PROCEDURE — 1090000001 HH PPS REVENUE CREDIT

## 2024-05-05 PROCEDURE — 1090000002 HH PPS REVENUE DEBIT

## 2024-05-06 ENCOUNTER — HOME CARE VISIT (OUTPATIENT)
Dept: HOME HEALTH SERVICES | Facility: HOME HEALTH | Age: 74
End: 2024-05-06
Payer: MEDICARE

## 2024-05-06 ENCOUNTER — TELEMEDICINE (OUTPATIENT)
Dept: CARE COORDINATION | Age: 74
End: 2024-05-06
Payer: MEDICARE

## 2024-05-06 ENCOUNTER — TELEMEDICINE (OUTPATIENT)
Dept: PHARMACY | Facility: HOSPITAL | Age: 74
End: 2024-05-06
Payer: MEDICARE

## 2024-05-06 ENCOUNTER — PATIENT OUTREACH (OUTPATIENT)
Dept: HOME HEALTH SERVICES | Age: 74
End: 2024-05-06

## 2024-05-06 VITALS
TEMPERATURE: 97.1 F | RESPIRATION RATE: 18 BRPM | SYSTOLIC BLOOD PRESSURE: 116 MMHG | DIASTOLIC BLOOD PRESSURE: 48 MMHG | OXYGEN SATURATION: 99 % | HEART RATE: 72 BPM

## 2024-05-06 DIAGNOSIS — I50.9 CONGESTIVE HEART FAILURE, UNSPECIFIED HF CHRONICITY, UNSPECIFIED HEART FAILURE TYPE (MULTI): ICD-10-CM

## 2024-05-06 DIAGNOSIS — J44.9 COPD, SEVERE (MULTI): ICD-10-CM

## 2024-05-06 DIAGNOSIS — I50.32 CHF (CONGESTIVE HEART FAILURE), NYHA CLASS I, CHRONIC, DIASTOLIC (MULTI): Primary | ICD-10-CM

## 2024-05-06 DIAGNOSIS — I48.92 ATRIAL FLUTTER, PAROXYSMAL (MULTI): Primary | ICD-10-CM

## 2024-05-06 PROCEDURE — 1090000001 HH PPS REVENUE CREDIT

## 2024-05-06 PROCEDURE — G0151 HHCP-SERV OF PT,EA 15 MIN: HCPCS | Mod: HHH

## 2024-05-06 PROCEDURE — 1090000002 HH PPS REVENUE DEBIT

## 2024-05-06 SDOH — HEALTH STABILITY: PHYSICAL HEALTH
EXERCISE COMMENTS: PERFORMED 3:30 MINS SEATED PEDALLING WARM-UP FOLLOWED BY INSTRUCTION AND RETURN DEMONSTRATION OF THERAPUETIC EXERCISES INCLUDING SEATED KNEE EXTENSIONS, SEATED MARCHING, SEATED HEEL AND TOE RAISES, SEATED BLUE THERABAND HIP ABDUCTION AND KNEE FLEXION

## 2024-05-06 SDOH — HEALTH STABILITY: PHYSICAL HEALTH: EXERCISE TYPE: TRUNK AND BLE STRENGTHENING

## 2024-05-06 SDOH — HEALTH STABILITY: PHYSICAL HEALTH
EXERCISE COMMENTS: , AND SEATED PILLOW SQUEEZE HIP ADDUCTION, SEATED ABDOMINAL BRACING (3 SECOND HOLD) 2 SET 10 REPS EACH.  PATIENT REQUIRED VERBAL AND VISUAL CUING FOR PROPER EXECUTION OF EXERCISES.  PT HAS BEEN PROVIDED WRITTEN HOME EX HANDOUTS.

## 2024-05-06 ASSESSMENT — ACTIVITIES OF DAILY LIVING (ADL)
AMBULATION ASSISTANCE ON FLAT SURFACES: 1
AMBULATION_DISTANCE/DURATION_TOLERATED: 5:00 MINS CONTINUOUSLY

## 2024-05-06 ASSESSMENT — ENCOUNTER SYMPTOMS
DENIES PAIN: 1
PAIN: PT REPORTS NO COMPLAINT OF PAIN.
HIGHEST PAIN SEVERITY IN PAST 24 HOURS: 0/10
PERSON REPORTING PAIN: PATIENT
LOWEST PAIN SEVERITY IN PAST 24 HOURS: 0/10
PAIN SEVERITY GOAL: 0/10

## 2024-05-06 ASSESSMENT — PAIN SCALES - PAIN ASSESSMENT IN ADVANCED DEMENTIA (PAINAD)
FACIALEXPRESSION: 0 - SMILING OR INEXPRESSIVE.
NEGVOCALIZATION: 0
BODYLANGUAGE: 0 - RELAXED.
BODYLANGUAGE: 0
TOTALSCORE: 0
BREATHING: 0
NEGVOCALIZATION: 0 - NONE.
FACIALEXPRESSION: 0
CONSOLABILITY: 0
CONSOLABILITY: 0 - NO NEED TO CONSOLE.

## 2024-05-06 NOTE — PROGRESS NOTES
Brief summary of hospitalization or reason for referral  Admission Dx and Associated Referral Dx:   GENEVA MCGEE is a 73 year old male with past medical history of COPD, end-stage renal disease on dialysis at home, hypertension, atrial flutter status post 2 ablations, who was hospitalized from 4/21-4/27 for acute hypoxic hypercapnic respiratory failure due to COPD exacerbation, pneumonia, acute on chronic diastolic CHF exacerbation.  Patient was followed by cardiology during inpatient stay.  Status post cardiac catheterization on 4/26/2024 with nonobstructive CAD and no stents were placed.  Echo on 4/20/2024 with EF of 60 to 65% with no diastolic dysfunction.     Admission Dx and Associated Referral Dx: Heart failure      Interval Subjective:   Pt reports breathing is much better. Pt is on 3.5L of O2 now and O2 sat 99% this am, and is typically on 4L on exertion and 2L at rest prior to hospitalization. Pt denies chest pain, fever, n/v/d. Dialysis at 4 days/week at home which is his baseline. Weight was 76 kg today and currently receiving dialysis. Pt does see a pulmonologist at [facility]. Pt does not have issues affording meds and no issues with transportation. He sees a cardiologist as well. 111/42 and hr at 75 currently and pt receiving dialysis currently.     Masimo: No  Oxygen: Yes  Oxygen (lpm): 4 (5/4/2024  1:29 PM)  CPAP/BIPAP: No    Wt Readings from Last 3 Encounters:   05/04/24 74.8 kg (164 lb 14.5 oz)   05/03/24 75.8 kg (167 lb)   05/01/24 75.9 kg (167 lb 5.3 oz)       Medications Issues/Refill need  Medication Follow up action needed:     Requested Prescriptions      No prescriptions requested or ordered in this encounter       Upcoming Visits:  Recent Visits  No visits were found meeting these conditions.  Showing recent visits within past 30 days and meeting all other requirements  Today's Visits  Date Type Provider Dept   05/06/24 Appointment [facility]   Showing  today's visits and meeting all other requirements  Future Appointments  No visits were found meeting these conditions.  Showing future appointments within next 90 days and meeting all other requirements      Interval or Pertinent Labs/Testing:  Lab Review:   Hemoglobin A1C   Date/Time Value Ref Range Status   12/10/2019 08:46 AM 6.1 % Final     Comment:          Diagnosis of Diabetes-Adults   Non-Diabetic: < or = 5.6%   Increased risk for developing diabetes: 5.7-6.4%   Diagnostic of diabetes: > or = 6.5%  .       Monitoring of Diabetes                Age (y)     Therapeutic Goal (%)   Adults:          >18           <7.0   Pediatrics:    13-18           <7.5                   7-12           <8.0                   0- 6            7.5-8.5   American Diabetes Association. Diabetes Care 33(S1), Jan 2010.         not applicable     SDOH Concerns & Interventions:     Assessment/Plan  Chronic diastolic CHF.  Continue torsemide 20 mg daily, home hemodialysis. Follow-up with cardiology appt on 5/13. F/u with nephrology, PCP.  End-stage renal disease.  Continue home dialysis 4 days/week and follow-up with nephrology.  COPD.  Recently had completed a course of steroids and antibiotics.  Continue Trelegy, Singulair, albuterol inhaler as needed.  Continue 5 L of nasal cannula.  Patient had declined Tato monitoring at last [facility] visit.  Follow-up with pulmonology and PCP.  Chronic atrial fibrillation.  Continue Eliquis and Coreg.  Follow-up with cardiology and PCP as an outpatient.    Zechariah, pharmacist, and Gwendolyn, nurse present on the call and it was a telephone visit.

## 2024-05-06 NOTE — PROGRESS NOTES
Pharmacy Post-Discharge Visit - Follow Up    Renard Ward is a 73 y.o. male was referred to Clinical Pharmacy Team to complete a post-discharge medication optimization and monitoring visit.  The patient was referred for their Afib, COPD and CHF management while also enrolled in [facility].     Referring Provider: Selvin Salamanca DO  PCP: Leandro Bassett DO       Subjective   Allergies   Allergen Reactions    Penicillins Anaphylaxis and Hives       [id number] - [place], [place] - [address]  [address]  [facility] [address]  Phone: [phone] Fax: [phone]    [facility] - [place], [place] - [address]  [address]  [address]  [facility] [address]  Phone: [phone] Fax: [phone]    [facility]  [address], [address]  [facility] [address]  Phone: [phone] Fax: [phone]      Medication System Management:  Affordability/Accessibility: approved for PAP  Adherence/Organization: no concerns       Social History     Social History Narrative    Not on file          HPI  CHF ASSESSMENT  Staging:  Most recent ejection fraction: 60-65%  NYHA Stage: III  ACC/AHA Stage: C     Symptom Assessment:  Weight changes/edema?: No  Dyspnea?: With exertion  Dizziness/syncope/palpitations?: No     Current Regimen:  ARNI/ACEi/ARB: No  Beta Blocker: Yes - carvedilol  MRA: No  SGLT2i: No     Other therapy:  Eliquis   Torsemide      Secondary Prevention:  The ASCVD Risk score (Eugenia DK, et al., 2019) failed to calculate for the following reasons:    The valid total cholesterol range is 130 to 320 mg/dL     Aspirin 81mg? no  Statin?: Yes - atorvastatin   HTN?: No      PULMONARY ASSESSMENT  Patient has been diagnosed with: COPD  does see a pulmonologist  Last visit: n/a     Current Regimen:  Trelegy   Albuterol      Appropriate Inhaler Technique? yes  How many times per week do you use your rescue inhaler? Has not had to use since being home      Symptom  Assessment:  Current symptoms: dyspnea and fatigue  Symptoms are improved     Exacerbation Hx:  When was your last hospitalization for an exacerbation? This past admission   When was the last time you were treated with antibiotics and/or steroids? This past admission - completed both while inpatient       Immunization History:  Influenza: Date [10/24/2023]  PCV13: Date [4/13/2022]  PPSV23: Date [n/a]  PCV20: Date [n/a]  COVID: Date [12/7/2021]  RSV: Date [n/a]     Smoking history:  He quit smoking     Review of Systems        Objective     There were no vitals taken for this visit.   BP Readings from Last 4 Encounters:   05/06/24 (!) 116/48   05/03/24 (!) 108/44   05/03/24 124/54   04/30/24 (!) 109/49      There were no vitals filed for this visit.     LAB  Lab Results   Component Value Date    BILITOT 0.4 04/21/2024    CALCIUM 8.6 04/26/2024    CO2 25 04/26/2024     04/26/2024    CREATININE 5.95 (H) 04/26/2024    GLUCOSE 98 04/26/2024    ALKPHOS 59 04/21/2024    K 5.2 04/26/2024    PROT 6.3 (L) 04/21/2024     04/26/2024    AST 12 04/21/2024    ALT 14 04/21/2024    BUN 85 (H) 04/26/2024    ANIONGAP 15 04/26/2024    MG 1.86 04/26/2024    PHOS CANCELED 08/06/2023    LDH 99 03/17/2020    ALBUMIN 4.0 04/21/2024    LIPASE 46 04/21/2024    GFRF CANCELED 08/06/2023    GFRMALE 9 (A) 09/21/2023     Lab Results   Component Value Date    TRIG 161 (H) 04/14/2023    CHOL 112 04/14/2023    HDL 26.6 (A) 04/14/2023     Lab Results   Component Value Date    HGBA1C 6.1 12/10/2019         Current Outpatient Medications   Medication Instructions    albuterol 90 mcg/actuation inhaler 2 puffs, inhalation, Every 6 hours PRN    atorvastatin (LIPITOR) 40 mg, oral, Nightly    carvedilol (Coreg) 6.25 mg tablet 1 tablet, oral, 2 times daily with meals    cyanocobalamin (vitamin B-12) (VITAMIN B-12) 1,000 mcg, oral, Daily    Eliquis 5 mg, oral, 2 times daily    ergocalciferol (VITAMIN D-2) 1.25 mg, oral, Once Weekly    famotidine  (PEPCID) 40 mg, oral, 2 times daily    fluticasone-umeclidin-vilanter (Trelegy Ellipta) 100-62.5-25 mcg blister with device 1 puff, inhalation, Daily RT    methIMAzole (TAPAZOLE) 5 mg, oral, Daily    montelukast (SINGULAIR) 10 mg, oral, Nightly    oxygen (O2) gas therapy 1 each, inhalation, Continuous, 5 Liters continuous    Mary Grace-Darryl Rx 1- mg-mg-mcg tablet 1 tablet, oral, Daily    torsemide (DEMADEX) 20 mg, oral, 2 times daily            Assessment/Plan   Problem List Items Addressed This Visit    None    Afib  No chest pain or SOB  No feelings of palpitations  Continue with coreg and eliquis   Approved for PAP for his eliquis and already received first 3 month supply   COPD  Completed all antibiotics and steroids while inpatient  Breathing has been fine since being home  Down to 3.5 L now (currently satting at 99%)  Did not want to be set up on hallie but has his own pulse ox and is checking it regularly to report to nursing so we can try to wean back down to baseline   Continue trelegy daily --> also approved with PAP and received 3 month supply too   Albuterol as needed   CHF  Current GDMT --> carvedilol   Not able to start on others due to kidney function  Does home dialysis 4 days a week   Continue torsemide   Also taking renal cap daily   Continue daily weights and BP's and keep log   BP's due tend to run low while doing his dialysis sessions     Follow Up: 1 week     Continue all meds under the continuation of care with the referring provider and clinical pharmacy team.    Zechariah Williamson, Meghan     Verbal consent to manage patient's drug therapy was obtained from the patient. They were informed they may decline to participate or withdraw from participation in pharmacy services at any time.

## 2024-05-06 NOTE — PROGRESS NOTES
[facility] call completed with Dr. Whatley and Zechariah from pharmacy. Patient  doing good.  currently doing his daily dialysis treatment (will be done at 1700). Patient denies CP, fever, chills, N/V/D. States breathing is much better. States weight 76 kg,  /42, HR 75, SpO2 99% 3.5L.  Patient  went for a 5 minute walk this morning with PT and his SpO2 only dropped to 94%. Patient  received received his 3 month supply of Trelegy and Eliquis. Patient denies any other medication issues and is able to get to and from appointments. Patient  has follow up PCP appointment schedule. Next [facility] scheduled for 5/13 @ 1430.

## 2024-05-07 ENCOUNTER — PATIENT OUTREACH (OUTPATIENT)
Dept: HOME HEALTH SERVICES | Age: 74
End: 2024-05-07
Payer: MEDICARE

## 2024-05-07 VITALS — WEIGHT: 167.11 LBS | BODY MASS INDEX: 21.45 KG/M2

## 2024-05-07 PROCEDURE — 1090000001 HH PPS REVENUE CREDIT

## 2024-05-07 PROCEDURE — 1090000002 HH PPS REVENUE DEBIT

## 2024-05-07 NOTE — PROGRESS NOTES
"Daily call completed. Pt in dialysis tx during call, pt states he is feeling well and doing better. Pt turned O2 down to 3.5 liters a few days ago and has had no issues with it since. Pt brought up that he needs a PCP appt (doesn't know what for was just told that he needs one), RN attempted to find one but first available was 7/1. Pt states his wife has an appt with Dr. Bassett in the next couple of weeks and Dr. Bassett usually lets them do a \"two for one\" so pt will have wife call to see if that can be set up. Pt denies any further needs/questions/concerns at this time. Aware of upcoming appts. [facility] 5/13 at 1430.    97% 3.5 liters  75.8kg      Pt Education: PCP appt/scheduling  Barriers: na  Topics for Daily Review: daily needs, vs, dw, O2 needs  Pt demonstrates clear understanding: Yes    Daily Weight:  There were no vitals filed for this visit.   Last 3 Weights:  Wt Readings from Last 7 Encounters:   05/04/24 74.8 kg (164 lb 14.5 oz)   05/03/24 75.8 kg (167 lb)   05/01/24 75.9 kg (167 lb 5.3 oz)   04/27/24 71.8 kg (158 lb 4.6 oz)   04/04/24 79.4 kg (175 lb)   03/28/24 83.9 kg (185 lb)   03/06/24 77.3 kg (170 lb 6.4 oz)       Masimo Device: No   Masimo Clinical Impression: na    Virtual Visits--Scheduled (Most Recent Date at Top)  Follow up Appointments  Recent Visits  No visits were found meeting these conditions.  Showing recent visits within past 30 days and meeting all other requirements  Future Appointments  No visits were found meeting these conditions.  Showing future appointments within next 90 days and meeting all other requirements       Frequency of RN Calls & Virtual Visits per Team Agreement: [facility] Frequency: Daily    Medication issues Addressed (what was done): na    Follow up appointments scheduled by [facility] Staff: na  Referrals made by [facility] staff: na      [facility] Care Transitions Assessment    Patient's Address:   [address] [place] [address] [place] [address]  **  If this is not the " address patient will receive services - alert team and address in EMR**       Patient Contacts:  Extended Emergency Contact Information  Primary Emergency Contact: EdCori  Mobile Phone: [phone]  Relation: Spouse  Preferred language: English   needed? No                                Patient's Preferred Phone: [phone]  Patient's E-mail: [email or web address]

## 2024-05-08 ENCOUNTER — HOME CARE VISIT (OUTPATIENT)
Dept: HOME HEALTH SERVICES | Facility: HOME HEALTH | Age: 74
End: 2024-05-08
Payer: MEDICARE

## 2024-05-08 VITALS
RESPIRATION RATE: 18 BRPM | HEART RATE: 72 BPM | SYSTOLIC BLOOD PRESSURE: 112 MMHG | TEMPERATURE: 96.9 F | OXYGEN SATURATION: 99 % | DIASTOLIC BLOOD PRESSURE: 44 MMHG

## 2024-05-08 PROCEDURE — G0151 HHCP-SERV OF PT,EA 15 MIN: HCPCS | Mod: HHH

## 2024-05-08 PROCEDURE — 1090000002 HH PPS REVENUE DEBIT

## 2024-05-08 PROCEDURE — 1090000001 HH PPS REVENUE CREDIT

## 2024-05-08 SDOH — HEALTH STABILITY: PHYSICAL HEALTH
EXERCISE COMMENTS: PERFORMED 4:00 MINS SEATED PEDALLING WARM-UP FOLLOWED BY INSTRUCTION AND RETURN DEMONSTRATION OF THERAPUETIC EXERCISES INCLUDING SEATED KNEE EXTENSIONS, SEATED MARCHING, SEATED HEEL AND TOE RAISES, SEATED BLUE THERABAND HIP ABDUCTION AND KNEE FLEXION

## 2024-05-08 SDOH — HEALTH STABILITY: PHYSICAL HEALTH: EXERCISE TYPE: TRUNK AND BLE STRENGTHENING EXERCISES.

## 2024-05-08 ASSESSMENT — PAIN SCALES - PAIN ASSESSMENT IN ADVANCED DEMENTIA (PAINAD)
NEGVOCALIZATION: 0
BODYLANGUAGE: 0
BODYLANGUAGE: 0 - RELAXED.
BREATHING: 0
NEGVOCALIZATION: 0 - NONE.
CONSOLABILITY: 0 - NO NEED TO CONSOLE.
FACIALEXPRESSION: 0 - SMILING OR INEXPRESSIVE.
CONSOLABILITY: 0
TOTALSCORE: 0
FACIALEXPRESSION: 0

## 2024-05-08 ASSESSMENT — ENCOUNTER SYMPTOMS
DENIES PAIN: 1
PERSON REPORTING PAIN: PATIENT

## 2024-05-09 ENCOUNTER — HOME CARE VISIT (OUTPATIENT)
Dept: HOME HEALTH SERVICES | Facility: HOME HEALTH | Age: 74
End: 2024-05-09
Payer: MEDICARE

## 2024-05-09 PROCEDURE — 1090000001 HH PPS REVENUE CREDIT

## 2024-05-09 PROCEDURE — G0152 HHCP-SERV OF OT,EA 15 MIN: HCPCS | Mod: HHH

## 2024-05-09 PROCEDURE — 1090000002 HH PPS REVENUE DEBIT

## 2024-05-09 ASSESSMENT — ENCOUNTER SYMPTOMS
PERSON REPORTING PAIN: PATIENT
DENIES PAIN: 1

## 2024-05-10 ENCOUNTER — HOME CARE VISIT (OUTPATIENT)
Dept: HOME HEALTH SERVICES | Facility: HOME HEALTH | Age: 74
End: 2024-05-10
Payer: MEDICARE

## 2024-05-10 ENCOUNTER — PATIENT OUTREACH (OUTPATIENT)
Dept: HOME HEALTH SERVICES | Age: 74
End: 2024-05-10
Payer: MEDICARE

## 2024-05-10 VITALS — DIASTOLIC BLOOD PRESSURE: 45 MMHG | SYSTOLIC BLOOD PRESSURE: 127 MMHG | OXYGEN SATURATION: 99 %

## 2024-05-10 VITALS
TEMPERATURE: 98 F | DIASTOLIC BLOOD PRESSURE: 58 MMHG | SYSTOLIC BLOOD PRESSURE: 140 MMHG | HEART RATE: 88 BPM | OXYGEN SATURATION: 99 % | RESPIRATION RATE: 20 BRPM

## 2024-05-10 PROCEDURE — G0300 HHS/HOSPICE OF LPN EA 15 MIN: HCPCS | Mod: HHH

## 2024-05-10 PROCEDURE — 1090000002 HH PPS REVENUE DEBIT

## 2024-05-10 PROCEDURE — 1090000001 HH PPS REVENUE CREDIT

## 2024-05-10 SDOH — ECONOMIC STABILITY: HOUSING INSECURITY: EVIDENCE OF SMOKING MATERIAL: 0

## 2024-05-10 SDOH — HEALTH STABILITY: MENTAL HEALTH: SMOKING IN HOME: 0

## 2024-05-10 ASSESSMENT — ENCOUNTER SYMPTOMS
SUBJECTIVE PAIN PROGRESSION: UNCHANGED
DENIES PAIN: 1
SHORTNESS OF BREATH: 1
PERSON REPORTING PAIN: PATIENT
CHANGE IN APPETITE: UNCHANGED
LOWER EXTREMITY EDEMA: 1
LOWEST PAIN SEVERITY IN PAST 24 HOURS: 0/10
DYSPNEA ACTIVITY LEVEL: AFTER AMBULATING MORE THAN 20 FT
HIGHEST PAIN SEVERITY IN PAST 24 HOURS: 0/10
PAIN SEVERITY GOAL: 0/10

## 2024-05-10 ASSESSMENT — PAIN SCALES - PAIN ASSESSMENT IN ADVANCED DEMENTIA (PAINAD)
NEGVOCALIZATION: 0
TOTALSCORE: 0
BODYLANGUAGE: 0
BODYLANGUAGE: 0 - RELAXED.
CONSOLABILITY: 0
FACIALEXPRESSION: 0 - SMILING OR INEXPRESSIVE.
BREATHING: 0
NEGVOCALIZATION: 0 - NONE.
FACIALEXPRESSION: 0
CONSOLABILITY: 0 - NO NEED TO CONSOLE.

## 2024-05-10 NOTE — PROGRESS NOTES
Chief Complaint/Reason for Visit:   Hospital follow up.      History Of Present Illness:    Mr. Ward is coming today as a hospital follow-up.  We have followed this patient previously for atrial flutter, hypertension, tobacco use disorder, prior TIA, and atheromatous aortic disease.  Patient was recently hospitalized at [facility] from April 21 through April 27, he presented with worsening shortness of breath and weakness for 1 week.  He also felt his heart was fluttering.  He had been gained weight even though he was going to dialysis as scheduled.  Echocardiogram showed normal LV systolic function, EF 60-65%, mild MR, mild TR, and moderate pleural effusion.  Stress testing was abnormal showing a partially reversible perfusion defect concerning for ischemia.  Left heart catheterization done April 26, 2024 showed mild nonobstructive coronary artery disease LVEDP was 14 mmHg.    Patient comes in today feeling well.  He is radial site healed up nicely.  He has some dyspnea on exertion which is at baseline, he continues to wear home oxygen.  He denies any chest pain, pressure, palpitations, orthopnea, or edema.  He has been very well compensated with his home hemodialysis.  Patient reports taking his medication as prescribed.    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[facility]) (06/16/2020), Iron deficiency anemia secondary to blood loss (chronic)  (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/2020), Nicotine dependence, unspecified, uncomplicated (01/14/2020), Nontoxic multinodular goiter (11/19/2020), Obstructive sleep apnea (adult) (pediatric) (03/11/2020), Other allergic rhinitis (02/23/2021), Other fecal abnormalities (07/30/2021), Other hypersomnia (01/23/2020), Other hypersomnia (01/23/2020), Other ill-defined heart diseases (04/27/2020), Other pericardial effusion (noninflammatory) ([facility]) (06/18/2020), Other specified personal risk factors, not elsewhere classified (07/30/2020), Other specified symptoms and signs involving the circulatory and respiratory systems (10/10/2019), Personal history of diseases of the skin and subcutaneous tissue (11/19/2020), Personal history of nicotine dependence (08/18/2021), Personal history of other diseases of the digestive system, Personal history of other diseases of the nervous system and sense organs, Personal history of other endocrine, nutritional and metabolic disease (11/19/2020), Personal history of other endocrine, nutritional and metabolic disease (10/14/2020), Personal history of other endocrine, nutritional and metabolic disease (08/20/2020), Personal history of other infectious and parasitic diseases (07/06/2021), Personal history of other infectious and parasitic diseases (02/04/2021), Personal history of other specified conditions (04/12/2016), Personal history of other specified conditions (04/07/2016), Personal history of transient ischemic attack (TIA), and cerebral infarction without residual deficits, Pleural effusion, not elsewhere classified (07/30/2020), Pleural effusion, not elsewhere classified (03/11/2020), Pneumonia due to Streptococcus pneumoniae (CMS-HCC) (03/11/2020), Post-traumatic stress disorder, unspecified, Rash and other nonspecific skin eruption (03/25/2021), Secondary polycythemia (01/23/2020), and Tinea unguium (08/25/2020).    Past Surgical History:  He has a past surgical history that includes Appendectomy  (04/07/2016); IR CVC tunneled (6/28/2023); and Cardiac catheterization (N/A, 4/26/2024).      Social History:  He reports that he has quit smoking. His smoking use included cigarettes. He has never used smokeless tobacco. He reports that he does not currently use alcohol. He reports that he does not use drugs.    Family History:  Family History   Family history unknown: Yes        Allergies:  Penicillins    Medications:  Current Outpatient Medications   Medication Instructions    albuterol 90 mcg/actuation inhaler 2 puffs, inhalation, Every 6 hours PRN    atorvastatin (LIPITOR) 40 mg, oral, Nightly    carvedilol (Coreg) 6.25 mg tablet 1 tablet, oral, 2 times daily with meals    cyanocobalamin (vitamin B-12) (VITAMIN B-12) 1,000 mcg, oral, Daily    Eliquis 5 mg, oral, 2 times daily    ergocalciferol (VITAMIN D-2) 1.25 mg, oral, Once Weekly    famotidine (PEPCID) 40 mg, oral, 2 times daily    fluticasone-umeclidin-vilanter (Trelegy Ellipta) 100-62.5-25 mcg blister with device 1 puff, inhalation, Daily RT    methIMAzole (TAPAZOLE) 5 mg, oral, Daily    montelukast (SINGULAIR) 10 mg, oral, Nightly    oxygen (O2) gas therapy 1 each, inhalation, Continuous, 5 Liters continuous    Mary Grace-Darryl Rx 1- mg-mg-mcg tablet 1 tablet, oral, Daily    torsemide (DEMADEX) 20 mg, oral, 2 times daily       Review of Systems:  Review of Systems   Constitutional: Negative. Negative for decreased appetite and malaise/fatigue.   HENT:  Positive for hoarse voice.    Eyes:  Negative for blurred vision and visual disturbance.   Cardiovascular:  Positive for dyspnea on exertion. Negative for chest pain, irregular heartbeat, leg swelling, orthopnea, palpitations and syncope.   Respiratory: Negative.  Negative for cough and shortness of breath.         Home O2   Musculoskeletal:  Negative for arthritis and falls.   Gastrointestinal: Negative.    Neurological:  Negative for focal weakness and light-headedness.   Psychiatric/Behavioral:  Negative  "for depression and memory loss.         Vitals  Visit Vitals  /52 (BP Location: Right arm, Patient Position: Sitting, BP Cuff Size: Adult)   Pulse 90   Ht 1.88 m (6' 2.02\")   Wt 78.9 kg (174 lb)   SpO2 95%   BMI 22.33 kg/m²   Smoking Status Former   BSA 2.03 m²        Physical Exam:  Physical Exam  Constitutional:       Appearance: Normal appearance.   HENT:      Head: Normocephalic.   Eyes:      Conjunctiva/sclera: Conjunctivae normal.   Neck:      Comments: + L bruit.  Dialysis cath RS area  Cardiovascular:      Rate and Rhythm: Normal rate and regular rhythm.      Pulses: Normal pulses.      Heart sounds: S1 normal and S2 normal. No murmur heard.     No friction rub. No gallop.   Pulmonary:      Effort: Pulmonary effort is normal.      Breath sounds: Normal breath sounds.      Comments: Wearing O2  Abdominal:      General: Bowel sounds are normal.      Palpations: Abdomen is soft.      Tenderness: There is no abdominal tenderness.   Musculoskeletal:      Cervical back: Neck supple.      Right lower leg: No edema.      Left lower leg: No edema.      Comments: R radial site without ecchymosis, good radial pulse.   Skin:     General: Skin is warm and dry.   Neurological:      General: No focal deficit present.      Mental Status: He is alert and oriented to person, place, and time.   Psychiatric:         Attention and Perception: Attention normal.         Mood and Affect: Mood normal.         Last Labs:  CBC -  Lab Results   Component Value Date    WBC 13.0 (H) 04/26/2024    HGB 8.5 (L) 04/26/2024    HCT 27.7 (L) 04/26/2024    MCV 99 04/26/2024     04/26/2024     Lab Results   Component Value Date    GLUCOSE 98 04/26/2024    CALCIUM 8.6 04/26/2024     04/26/2024    K 5.2 04/26/2024    CO2 25 04/26/2024     04/26/2024    BUN 85 (H) 04/26/2024    CREATININE 5.95 (H) 04/26/2024      CMP -  Lab Results   Component Value Date    CALCIUM 8.6 04/26/2024    PHOS CANCELED 08/06/2023    PROT 6.3 (L) " 04/21/2024    ALBUMIN 4.0 04/21/2024    AST 12 04/21/2024    ALT 14 04/21/2024    ALKPHOS 59 04/21/2024    BILITOT 0.4 04/21/2024       LIPID PANEL -   Lab Results   Component Value Date    CHOL 112 04/14/2023    TRIG 161 (H) 04/14/2023    HDL 26.6 (A) 04/14/2023    CHHDL 4.2 04/14/2023    LDLF 53 04/14/2023    VLDL 32 04/14/2023       Lab Results   Component Value Date    BNP 1,040 (H) 04/21/2024    HGBA1C 6.1 12/10/2019       Last Cardiology Tests:  ECG:  EKG done in the office today and personally reviewed showed sinus rhythm at 77 bpm, QT corrected interval 409 ms.  No significant ST or T wave abnormalities.  This will go to Dr. Feng for review.    Echo:4-22-24  CONCLUSIONS:   1. Left ventricular systolic function is normal with a 60-65% estimated ejection fraction.   2. There is a moderate pleural effusion.   3. Mild mitral valve regurgitation.   4. Mild tricuspid regurgitation is visualized.   5. Mildly elevated RVSP    Cath:4-26-24  Recommendations:  Maximize medical therapy.  Agressive risk factor modification efforts.  Telemetry monitoring.  Follow-up with cardiology clinic.  Monitor vitals and arterial access site/pulses.  Lipid lowering agent or Statin therapy.  Medical management of coronary artery disease.  Aspirin therapy.     ____________________________________________________________________________________  CONCLUSIONS:   1. Mild nonobstructive CAD.   2. No significant LV-AO peak to peak pullback gradient.   3. Left Ventricular end-diastolic pressure = 14.        Lab review: I have personally reviewed the laboratory result(s)     Assessment/Plan:    CAD: Heart catheterization showed mild, nonobstructive coronary artery disease.  Patient is angina class 0.  He currently is on atorvastatin and a beta-blocker.  He is not on aspirin due to the need for anticoagulation.    Paroxysmal atrial flutter: Patient has a history of recurrent atrial flutter with 2 prior ablations.  He saw the EP service  in December.  Patient feels he is maintaining sinus rhythm.  He currently is on carvedilol 6.25 mg twice daily.  Patient has an elevated [id number] score requiring anticoagulation.  He is on Eliquis 5 mg twice daily.  Patient is not having any abnormal bleeding or bruising and should continue on anticoagulation.    Hypertension: Blood pressure today shows good control at 120/52.  Patient will continue on carvedilol and torsemide.    Dyslipidemia: Lipid labs from April show triglycerides 161, LDL 53.  Patient will continue to eat a heart healthy diet and continue on atorvastatin 40 mg nightly.    Patient will follow-up with Dr. Feng in August as planned.  Patient instructed to call with any cardiovascular complaints. All questions were answered.       Dragon dictation was utilized to create this document. Quite often unanticipated grammatical, syntax,  and other interpretive errors are inadvertently transcribed by the computer software.  Please disregard these errors.  Please excuse any errors that have escaped final proofreading.            Saritha Araujo, APRN-CNP

## 2024-05-10 NOTE — PROGRESS NOTES
Daily Call Note:   Spoke to patient for daily call. He reports receiving HD now and doing fine. Reports bp off HD machine and states he will have 1200 taken off today in preparation for weekend.   Currently on 3.5L NC O2. 99%.  He will go down to 94% with ambulation. His plan is to continue to wean as tolerated.  He has HC nurse coming alter today for eval.   Reviewed upcoming calls, no other concerns at this time.    Pt Education: POC  Barriers: na  Topics for Daily Review: POC  Pt demonstrates clear understanding: Yes    Daily Weight:  There were no vitals filed for this visit.   Last 3 Weights:  Wt Readings from Last 7 Encounters:   05/07/24 75.8 kg (167 lb 1.7 oz)   05/04/24 74.8 kg (164 lb 14.5 oz)   05/03/24 75.8 kg (167 lb)   05/01/24 75.9 kg (167 lb 5.3 oz)   04/27/24 71.8 kg (158 lb 4.6 oz)   04/04/24 79.4 kg (175 lb)   03/28/24 83.9 kg (185 lb)       Masimo Device: No   Masimo Clinical Impression: na    Virtual Visits--Scheduled (Most Recent Date at Top)  Follow up Appointments  Recent Visits  No visits were found meeting these conditions.  Showing recent visits within past 30 days and meeting all other requirements  Future Appointments  No visits were found meeting these conditions.  Showing future appointments within next 90 days and meeting all other requirements       Frequency of RN Calls & Virtual Visits per Team Agreement: Healthy at Home Frequency: Daily    Medication issues Addressed (what was done): na    Follow up appointments scheduled by [facility] Staff: jerica  Referrals made by [facility] staff: jerica

## 2024-05-11 ENCOUNTER — PATIENT OUTREACH (OUTPATIENT)
Dept: HOME HEALTH SERVICES | Age: 74
End: 2024-05-11
Payer: MEDICARE

## 2024-05-11 VITALS — DIASTOLIC BLOOD PRESSURE: 55 MMHG | HEART RATE: 75 BPM | OXYGEN SATURATION: 99 % | SYSTOLIC BLOOD PRESSURE: 125 MMHG

## 2024-05-11 PROCEDURE — 1090000001 HH PPS REVENUE CREDIT

## 2024-05-11 PROCEDURE — 1090000002 HH PPS REVENUE DEBIT

## 2024-05-11 NOTE — PROGRESS NOTES
Daily Call Note: The patient did not have time to complete the full call, as he was on his way out for an appointment. He reported that he is doing well. No difficulty breathing, no peripheral edema. He takes home dialysis on M-T-Th-Fri, and was feeling well enough to leave home. He was reminded of his [facility] appointment scheduled for Monday 5/13 at 1430. He will need it rescheduled due to having an MD appointment that day at 1500.    [facility] appointment rescheduled to Wednesday 5/15 at 1330 - patient was notified prior to ending the call.        Daily Weight:  There were no vitals filed for this visit.   Last 3 Weights:  Wt Readings from Last 7 Encounters:   05/07/24 75.8 kg (167 lb 1.7 oz)   05/04/24 74.8 kg (164 lb 14.5 oz)   05/03/24 75.8 kg (167 lb)   05/01/24 75.9 kg (167 lb 5.3 oz)   04/27/24 71.8 kg (158 lb 4.6 oz)   04/04/24 79.4 kg (175 lb)   03/28/24 83.9 kg (185 lb)     /55   Pulse 75   SpO2 99%     Masimo Device: No   Masimo Clinical Impression:     Virtual Visits--Scheduled (Most Recent Date at Top)  Follow up Appointments  Recent Visits  No visits were found meeting these conditions.  Showing recent visits within past 30 days and meeting all other requirements  Future Appointments  No visits were found meeting these conditions.  Showing future appointments within next 90 days and meeting all other requirements       Frequency of RN Calls & Virtual Visits per Team Agreement: Healthy at Home Frequency: Daily    Medication issues Addressed (what was done):                   [Initial Visit] : an initial visit for [Back Pain] : back pain

## 2024-05-12 PROCEDURE — 1090000002 HH PPS REVENUE DEBIT

## 2024-05-12 PROCEDURE — 1090000001 HH PPS REVENUE CREDIT

## 2024-05-13 ENCOUNTER — APPOINTMENT (OUTPATIENT)
Dept: CARE COORDINATION | Age: 74
End: 2024-05-13
Payer: MEDICARE

## 2024-05-13 ENCOUNTER — OFFICE VISIT (OUTPATIENT)
Dept: CARDIOLOGY | Facility: CLINIC | Age: 74
End: 2024-05-13
Payer: MEDICARE

## 2024-05-13 ENCOUNTER — APPOINTMENT (OUTPATIENT)
Dept: PHARMACY | Facility: HOSPITAL | Age: 74
End: 2024-05-13
Payer: MEDICARE

## 2024-05-13 ENCOUNTER — PATIENT OUTREACH (OUTPATIENT)
Dept: HOME HEALTH SERVICES | Age: 74
End: 2024-05-13

## 2024-05-13 VITALS
HEART RATE: 90 BPM | BODY MASS INDEX: 22.33 KG/M2 | DIASTOLIC BLOOD PRESSURE: 52 MMHG | OXYGEN SATURATION: 95 % | WEIGHT: 174 LBS | HEIGHT: 74 IN | SYSTOLIC BLOOD PRESSURE: 120 MMHG

## 2024-05-13 VITALS — SYSTOLIC BLOOD PRESSURE: 122 MMHG | DIASTOLIC BLOOD PRESSURE: 48 MMHG | OXYGEN SATURATION: 98 %

## 2024-05-13 DIAGNOSIS — I25.10 CORONARY ARTERY DISEASE INVOLVING NATIVE CORONARY ARTERY OF NATIVE HEART WITHOUT ANGINA PECTORIS: ICD-10-CM

## 2024-05-13 DIAGNOSIS — I70.0 ATHEROSCLEROSIS OF AORTA (CMS-HCC): ICD-10-CM

## 2024-05-13 DIAGNOSIS — I48.0 PAROXYSMAL A-FIB (MULTI): Primary | ICD-10-CM

## 2024-05-13 DIAGNOSIS — I10 ESSENTIAL HYPERTENSION: ICD-10-CM

## 2024-05-13 DIAGNOSIS — E78.5 DYSLIPIDEMIA: ICD-10-CM

## 2024-05-13 DIAGNOSIS — E78.5 DYSLIPIDEMIA, GOAL LDL BELOW 70: ICD-10-CM

## 2024-05-13 DIAGNOSIS — I48.92 ATRIAL FLUTTER, PAROXYSMAL (MULTI): ICD-10-CM

## 2024-05-13 PROCEDURE — 1111F DSCHRG MED/CURRENT MED MERGE: CPT | Performed by: NURSE PRACTITIONER

## 2024-05-13 PROCEDURE — 1160F RVW MEDS BY RX/DR IN RCRD: CPT | Performed by: NURSE PRACTITIONER

## 2024-05-13 PROCEDURE — 3074F SYST BP LT 130 MM HG: CPT | Performed by: NURSE PRACTITIONER

## 2024-05-13 PROCEDURE — 1036F TOBACCO NON-USER: CPT | Performed by: NURSE PRACTITIONER

## 2024-05-13 PROCEDURE — 3078F DIAST BP <80 MM HG: CPT | Performed by: NURSE PRACTITIONER

## 2024-05-13 PROCEDURE — 1090000002 HH PPS REVENUE DEBIT

## 2024-05-13 PROCEDURE — 93000 ELECTROCARDIOGRAM COMPLETE: CPT | Performed by: INTERNAL MEDICINE

## 2024-05-13 PROCEDURE — 1159F MED LIST DOCD IN RCRD: CPT | Performed by: NURSE PRACTITIONER

## 2024-05-13 PROCEDURE — 99214 OFFICE O/P EST MOD 30 MIN: CPT | Performed by: NURSE PRACTITIONER

## 2024-05-13 PROCEDURE — 1090000001 HH PPS REVENUE CREDIT

## 2024-05-13 ASSESSMENT — ENCOUNTER SYMPTOMS
FOCAL WEAKNESS: 0
SYNCOPE: 0
GASTROINTESTINAL NEGATIVE: 1
DEPRESSION: 0
DECREASED APPETITE: 0
FALLS: 0
CONSTITUTIONAL NEGATIVE: 1
SHORTNESS OF BREATH: 0
HOARSE VOICE: 1
DYSPNEA ON EXERTION: 1
BLURRED VISION: 0
COUGH: 0
RESPIRATORY NEGATIVE: 1
IRREGULAR HEARTBEAT: 0
LIGHT-HEADEDNESS: 0
ORTHOPNEA: 0
PALPITATIONS: 0
MEMORY LOSS: 0

## 2024-05-13 NOTE — PROGRESS NOTES
Daily Call Note:   Spoke to patient, he is receiving HD now, doing well. Still on 3.5L NC, took a 150 yard walk yesterday ON O2 and did well.   Reports pulse ox, 98%.  He sees cardiology today at 3pm and HC following.  Denies questions or concerns.   Call frequency changed to HD days per request.  Next call reviewed.     Pt Education: POC  Barriers: na  Topics for Daily Review: POC  Pt demonstrates clear understanding: Yes    Daily Weight:  There were no vitals filed for this visit.   Last 3 Weights:  Wt Readings from Last 7 Encounters:   05/07/24 75.8 kg (167 lb 1.7 oz)   05/04/24 74.8 kg (164 lb 14.5 oz)   05/03/24 75.8 kg (167 lb)   05/01/24 75.9 kg (167 lb 5.3 oz)   04/27/24 71.8 kg (158 lb 4.6 oz)   04/04/24 79.4 kg (175 lb)   03/28/24 83.9 kg (185 lb)       Masimo Device: No   Masimo Clinical Impression: na    Virtual Visits--Scheduled (Most Recent Date at Top)  Follow up Appointments  Recent Visits  No visits were found meeting these conditions.  Showing recent visits within past 30 days and meeting all other requirements  Future Appointments  No visits were found meeting these conditions.  Showing future appointments within next 90 days and meeting all other requirements       Frequency of RN Calls & Virtual Visits per Team Agreement: Healthy at Home Frequency: Bi-Weekly    Medication issues Addressed (what was done): na    Follow up appointments scheduled by [facility] Staff: jerica  Referrals made by [facility] staff: jerica

## 2024-05-13 NOTE — PATIENT INSTRUCTIONS
Continue on current meds  Heart healthy, low sodium diet  Mediterranean diet is recommended  Follow up with DR Feng in AUG as planned.

## 2024-05-14 ENCOUNTER — TELEPHONE (OUTPATIENT)
Dept: PRIMARY CARE | Facility: CLINIC | Age: 74
End: 2024-05-14

## 2024-05-14 PROCEDURE — G0180 MD CERTIFICATION HHA PATIENT: HCPCS | Performed by: INTERNAL MEDICINE

## 2024-05-14 PROCEDURE — 1090000001 HH PPS REVENUE CREDIT

## 2024-05-14 PROCEDURE — 1090000002 HH PPS REVENUE DEBIT

## 2024-05-14 NOTE — TELEPHONE ENCOUNTER
Patients wife is scheduled on 5/15 @ 2:20 for [facility] and she wanted to know if her  can be seen at same time for hospital followup.

## 2024-05-15 ENCOUNTER — TELEMEDICINE (OUTPATIENT)
Dept: PHARMACY | Facility: HOSPITAL | Age: 74
End: 2024-05-15
Payer: MEDICARE

## 2024-05-15 ENCOUNTER — PATIENT OUTREACH (OUTPATIENT)
Dept: HOME HEALTH SERVICES | Age: 74
End: 2024-05-15

## 2024-05-15 ENCOUNTER — OFFICE VISIT (OUTPATIENT)
Dept: PRIMARY CARE | Facility: CLINIC | Age: 74
End: 2024-05-15
Payer: MEDICARE

## 2024-05-15 ENCOUNTER — DOCUMENTATION (OUTPATIENT)
Dept: PRIMARY CARE | Facility: CLINIC | Age: 74
End: 2024-05-15

## 2024-05-15 VITALS
WEIGHT: 174 LBS | HEART RATE: 82 BPM | OXYGEN SATURATION: 94 % | DIASTOLIC BLOOD PRESSURE: 67 MMHG | BODY MASS INDEX: 22.33 KG/M2 | HEIGHT: 74 IN | SYSTOLIC BLOOD PRESSURE: 135 MMHG

## 2024-05-15 DIAGNOSIS — D63.1 ANEMIA OF CHRONIC RENAL FAILURE, STAGE 5 (MULTI): ICD-10-CM

## 2024-05-15 DIAGNOSIS — I48.92 ATRIAL FLUTTER, PAROXYSMAL (MULTI): ICD-10-CM

## 2024-05-15 DIAGNOSIS — R73.02 IGT (IMPAIRED GLUCOSE TOLERANCE): ICD-10-CM

## 2024-05-15 DIAGNOSIS — E06.3 HYPERTHYROIDISM WITH HASHIMOTO DISEASE: ICD-10-CM

## 2024-05-15 DIAGNOSIS — I27.81 COR PULMONALE (CHRONIC) (MULTI): ICD-10-CM

## 2024-05-15 DIAGNOSIS — N18.6 ESRD (END STAGE RENAL DISEASE) ON DIALYSIS (MULTI): ICD-10-CM

## 2024-05-15 DIAGNOSIS — I70.0 ATHEROSCLEROSIS OF AORTA (CMS-HCC): ICD-10-CM

## 2024-05-15 DIAGNOSIS — I48.0 PAROXYSMAL A-FIB (MULTI): ICD-10-CM

## 2024-05-15 DIAGNOSIS — N18.6 ESRD (END STAGE RENAL DISEASE) (MULTI): ICD-10-CM

## 2024-05-15 DIAGNOSIS — N18.5 ANEMIA OF CHRONIC RENAL FAILURE, STAGE 5 (MULTI): ICD-10-CM

## 2024-05-15 DIAGNOSIS — Z99.2 ESRD (END STAGE RENAL DISEASE) ON DIALYSIS (MULTI): ICD-10-CM

## 2024-05-15 DIAGNOSIS — J44.9 COPD, SEVERE (MULTI): Primary | ICD-10-CM

## 2024-05-15 DIAGNOSIS — Z99.2 HEMODIALYSIS PATIENT (CMS-HCC): ICD-10-CM

## 2024-05-15 DIAGNOSIS — N25.81 SECONDARY HYPERPARATHYROIDISM OF RENAL ORIGIN (MULTI): ICD-10-CM

## 2024-05-15 DIAGNOSIS — E05.80 HYPERTHYROIDISM WITH HASHIMOTO DISEASE: ICD-10-CM

## 2024-05-15 DIAGNOSIS — I50.9 CONGESTIVE HEART FAILURE, UNSPECIFIED HF CHRONICITY, UNSPECIFIED HEART FAILURE TYPE (MULTI): ICD-10-CM

## 2024-05-15 PROBLEM — D68.9 COAGULATION DISORDER (MULTI): Status: RESOLVED | Noted: 2018-11-28 | Resolved: 2024-05-15

## 2024-05-15 PROBLEM — R06.09 DYSPNEA ON EXERTION: Status: RESOLVED | Noted: 2024-05-03 | Resolved: 2024-05-15

## 2024-05-15 PROBLEM — H72.91 TYMPANIC MEMBRANE PERFORATION, RIGHT: Status: RESOLVED | Noted: 2023-08-06 | Resolved: 2024-05-15

## 2024-05-15 PROBLEM — D64.9 ANEMIA: Status: RESOLVED | Noted: 2023-09-21 | Resolved: 2024-05-15

## 2024-05-15 PROBLEM — H61.21 HEARING LOSS OF RIGHT EAR DUE TO CERUMEN IMPACTION: Status: ACTIVE | Noted: 2024-05-15

## 2024-05-15 PROCEDURE — 1159F MED LIST DOCD IN RCRD: CPT | Performed by: INTERNAL MEDICINE

## 2024-05-15 PROCEDURE — 1111F DSCHRG MED/CURRENT MED MERGE: CPT | Performed by: INTERNAL MEDICINE

## 2024-05-15 PROCEDURE — 69209 REMOVE IMPACTED EAR WAX UNI: CPT | Performed by: INTERNAL MEDICINE

## 2024-05-15 PROCEDURE — 99214 OFFICE O/P EST MOD 30 MIN: CPT | Performed by: INTERNAL MEDICINE

## 2024-05-15 PROCEDURE — 1090000001 HH PPS REVENUE CREDIT

## 2024-05-15 PROCEDURE — 1036F TOBACCO NON-USER: CPT | Performed by: INTERNAL MEDICINE

## 2024-05-15 PROCEDURE — 1090000002 HH PPS REVENUE DEBIT

## 2024-05-15 PROCEDURE — 1160F RVW MEDS BY RX/DR IN RCRD: CPT | Performed by: INTERNAL MEDICINE

## 2024-05-15 RX ORDER — TORSEMIDE 20 MG/1
20 TABLET ORAL 2 TIMES DAILY
Qty: 60 TABLET | Refills: 2 | Status: SHIPPED | OUTPATIENT
Start: 2024-05-15 | End: 2024-08-13

## 2024-05-15 RX ORDER — ALBUTEROL SULFATE 90 UG/1
2 AEROSOL, METERED RESPIRATORY (INHALATION) EVERY 6 HOURS PRN
Qty: 18 G | Refills: 2 | Status: SHIPPED | OUTPATIENT
Start: 2024-05-15

## 2024-05-15 RX ORDER — CARVEDILOL 6.25 MG/1
6.25 TABLET ORAL
Qty: 60 TABLET | Refills: 2 | Status: SHIPPED | OUTPATIENT
Start: 2024-05-15

## 2024-05-15 ASSESSMENT — ENCOUNTER SYMPTOMS: SHORTNESS OF BREATH: 1

## 2024-05-15 NOTE — PROGRESS NOTES
"Subjective   Reason for Visit: Renard Ward is an 73 y.o. male here for a Medicare Wellness visit.     Past Medical, Surgical, and Family History reviewed and updated in chart.    Reviewed all medications by prescribing practitioner or clinical pharmacist (such as prescriptions, OTCs, herbal therapies and supplements) and documented in the medical record.    HPI    Patient Care Team:  Leandro Bassett DO as PCP - General  Georges Kurtz, RN as Care Manager (Case Management)     Review of Systems   Respiratory:  Positive for shortness of breath.    All other systems reviewed and are negative.      Objective   Vitals:  /67   Pulse 82   Ht 1.88 m (6' 2\")   Wt 78.9 kg (174 lb)   SpO2 94%   BMI 22.34 kg/m²       Physical Exam    Assessment/Plan   Problem List Items Addressed This Visit       Atherosclerosis of aorta (CMS-HCC)    Current Assessment & Plan     Stable continue to follow with echocardiogram         Atrial flutter, paroxysmal (Multi)    Current Assessment & Plan     Rate and rhythm controlled stable         ESRD (end stage renal disease) (Multi)    Overview     Last Assessment & Plan: Formatting of this note might be different from the original. Assessment: On peritoneal dialysis, reports compliance with treatments. Follows with local nephrologist.         Current Assessment & Plan     Stable improving continue optimization working with nephrology clinic         Hemodialysis patient (CMS-HCC)    Current Assessment & Plan     Continues with home hemodialysis         COPD, severe (Multi) - Primary    Overview     >>OVERVIEW FOR COPD (CHRONIC OBSTRUCTIVE PULMONARY DISEASE) (CMS/HCC) WRITTEN ON 9/21/2023  4:09 AM BY LIZETT HARRINGTON    Last Assessment & Plan: Formatting of this note might be different from the original. Images from the original note were not included. Assessment: Severe COPD. Follows with ALONDRA Gonzalez-CNP  pulmonology, last OV 1/20/2023. Compliant on inhalers. Denies recent " exacerbations or hospitalizations. Previously required home oxygen use, no longer requires. Recent pulmonary testing PFT's below. Lungs CTA. No longer uses tobacco. 11/21/2022:         Current Assessment & Plan     Continue Trelegy Ellipta tapering off chronic oxygen therapy down to 3 L daily stable at this time         Relevant Orders    Follow Up In [facility] - PCP - Established    Comprehensive Metabolic Panel    Hemoglobin A1C    Lipid Panel    Tsh With Reflex To Free T4 If Abnormal    CBC and Auto Differential    Iron and TIBC    PTH, intact    Vitamin B12    Vitamin D 25-Hydroxy,Total (for eval of Vitamin D levels)    Secondary hyperparathyroidism of renal origin (Multi)    Current Assessment & Plan     Check parathyroid levels and calcium vitamin D levels         Cor pulmonale (chronic) (Multi)    Overview     Cor pulmonale, chronic         Current Assessment & Plan     Improvement with reevaluation of dry weight for chronic dialysis was able to drop fluid weight significantly with improvement in status         Paroxysmal A-fib (Multi)    Current Assessment & Plan     Normal sinus rhythm at this time continue apixaban 5 mg twice a day carvedilol 6.25 mg twice a day         Anemia of chronic renal failure, stage 5 (Multi)    Current Assessment & Plan     Reevaluate lab work with iron and erythropoietin administration per nephrologist         ESRD (end stage renal disease) on dialysis (Multi)    Relevant Orders    Iron and TIBC    Hyperthyroidism with Hashimoto disease    Relevant Orders    Tsh With Reflex To Free T4 If Abnormal    IGT (impaired glucose tolerance)    Relevant Orders    Hemoglobin A1C

## 2024-05-15 NOTE — ASSESSMENT & PLAN NOTE
Normal sinus rhythm at this time continue apixaban 5 mg twice a day carvedilol 6.25 mg twice a day

## 2024-05-15 NOTE — ASSESSMENT & PLAN NOTE
Continue Trelegy Ellipta tapering off chronic oxygen therapy down to 3 L daily stable at this time

## 2024-05-15 NOTE — PROGRESS NOTES
[facility] Call Note:   Spoke to patient he reports feeling good. Denies any new symptoms, denies SOB or swelling. Still on 3.5L NC, doing well.  Saw cardio this week and states all was good. Sees PCP today. HC and therapies following. HD going well.   No medication issues.  Attempted to schedule next [facility] and patient states has a lot of appointments and calls. Discussed early graduation, patient and provider agree ok for graduation today.  Pt graduated from [facility] program today.   Pt Education: POC  Barriers: na  Topics for Daily Review: POC  Pt demonstrates clear understanding: Yes    Daily Weight:  There were no vitals filed for this visit.   Last 3 Weights:  Wt Readings from Last 7 Encounters:   05/13/24 78.9 kg (174 lb)   05/07/24 75.8 kg (167 lb 1.7 oz)   05/04/24 74.8 kg (164 lb 14.5 oz)   05/03/24 75.8 kg (167 lb)   05/01/24 75.9 kg (167 lb 5.3 oz)   04/27/24 71.8 kg (158 lb 4.6 oz)   04/04/24 79.4 kg (175 lb)       Masimo Device: No   Masimo Clinical Impression: na    Virtual Visits--Scheduled (Most Recent Date at Top)  Follow up Appointments  Recent Visits  No visits were found meeting these conditions.  Showing recent visits within past 30 days and meeting all other requirements  Today's Visits  Date Type Provider Dept   05/15/24 Appointment Leandro Bassett DO [id number]   Showing today's visits and meeting all other requirements  Future Appointments  No visits were found meeting these conditions.  Showing future appointments within next 90 days and meeting all other requirements       Frequency of RN Calls & Virtual Visits per Team Agreement: Healthy at Home Frequency: Bi-Weekly    Medication issues Addressed (what was done): na    Follow up appointments scheduled by [facility] Staff: na  Referrals made by [facility] staff: jerica

## 2024-05-15 NOTE — ASSESSMENT & PLAN NOTE
Improvement with reevaluation of dry weight for chronic dialysis was able to drop fluid weight significantly with improvement in status

## 2024-05-15 NOTE — PROGRESS NOTES
"Subjective   Patient ID: Renard Ward is a 73 y.o. male who presents for Hsopital follow up .    HPI     Review of Systems    Objective   /67   Pulse 82   Ht 1.88 m (6' 2\")   Wt 78.9 kg (174 lb)   SpO2 94%   BMI 22.34 kg/m²     Physical Exam    Assessment/Plan          "

## 2024-05-15 NOTE — ASSESSMENT & PLAN NOTE
>>ASSESSMENT AND PLAN FOR ESRD (END STAGE RENAL DISEASE) ON DIALYSIS (MULTI) WRITTEN ON 8/14/2024  7:17 PM BY DEVORAH MOSES DO    >>ASSESSMENT AND PLAN FOR END STAGE RENAL FAILURE ON DIALYSIS (MULTI) WRITTEN ON 5/15/2024  3:30 PM BY DEVORAH MOSES DO    Stable improving continue optimization working with nephrology clinic    >>ASSESSMENT AND PLAN FOR HEMODIALYSIS PATIENT (CMS-HCC) WRITTEN ON 5/15/2024  3:30 PM BY DEVORAH MOSES DO    Continues with home hemodialysis

## 2024-05-15 NOTE — PROGRESS NOTES
Pharmacy Post-Discharge Visit - Follow Up    Renard Ward is a 73 y.o. male was referred to Clinical Pharmacy Team to complete a post-discharge medication optimization and monitoring visit.  The patient was referred for their Afib, COPD and CHF management while also enrolled in [facility].       Referring Provider: Shaheen Boone DO  PCP: Leandro Bassett DO - next visit: going today      Subjective   Allergies   Allergen Reactions    Penicillins Anaphylaxis and Hives       [id number] - [place], [place] - [address]  [address]  [facility] [address]  Phone: [phone] Fax: [phone]    [facility] - [place], [place] - [address]  [address]  [address]  [facility] [address]  Phone: [phone] Fax: [phone]    [facility]  [address], [address]  [facility] [address]  Phone: [phone] Fax: [phone]      Medication System Management:  Affordability/Accessibility: approved for PAP  Adherence/Organization: no concerns       Social History     Social History Narrative    Not on file          HPI  CHF ASSESSMENT  Staging:  Most recent ejection fraction: 60-65%  NYHA Stage: III  ACC/AHA Stage: C     Symptom Assessment:  Weight changes/edema?: No  Dyspnea?: With exertion  Dizziness/syncope/palpitations?: No     Current Regimen:  ARNI/ACEi/ARB: No  Beta Blocker: Yes - carvedilol  MRA: No  SGLT2i: No     Other therapy:  Eliquis   Torsemide      Secondary Prevention:  The ASCVD Risk score (Eugenia ROE, et al., 2019) failed to calculate for the following reasons:    The valid total cholesterol range is 130 to 320 mg/dL     Aspirin 81mg? no  Statin?: Yes - atorvastatin   HTN?: No      PULMONARY ASSESSMENT  Patient has been diagnosed with: COPD  does see a pulmonologist  Last visit: n/a     Current Regimen:  Trelegy   Albuterol      Appropriate Inhaler Technique? yes  How many times per week do you use your rescue inhaler? Has not had to use since being  home      Symptom Assessment:  Current symptoms: dyspnea and fatigue  Symptoms are improved     Exacerbation Hx:  When was your last hospitalization for an exacerbation? This past admission   When was the last time you were treated with antibiotics and/or steroids? This past admission - completed both while inpatient       Immunization History:  Influenza: Date [10/24/2023]  PCV13: Date [4/13/2022]  PPSV23: Date [n/a]  PCV20: Date [n/a]  COVID: Date [12/7/2021]  RSV: Date [n/a]     Smoking history:  He quit smoking     Review of Systems        Objective     There were no vitals taken for this visit.   BP Readings from Last 4 Encounters:   05/13/24 120/52   05/13/24 (!) 122/48   05/11/24 125/55   05/10/24 (!) 127/45      There were no vitals filed for this visit.     LAB  Lab Results   Component Value Date    BILITOT 0.4 04/21/2024    CALCIUM 8.6 04/26/2024    CO2 25 04/26/2024     04/26/2024    CREATININE 5.95 (H) 04/26/2024    GLUCOSE 98 04/26/2024    ALKPHOS 59 04/21/2024    K 5.2 04/26/2024    PROT 6.3 (L) 04/21/2024     04/26/2024    AST 12 04/21/2024    ALT 14 04/21/2024    BUN 85 (H) 04/26/2024    ANIONGAP 15 04/26/2024    MG 1.86 04/26/2024    PHOS CANCELED 08/06/2023    LDH 99 03/17/2020    ALBUMIN 4.0 04/21/2024    LIPASE 46 04/21/2024    GFRF CANCELED 08/06/2023    GFRMALE 9 (A) 09/21/2023     Lab Results   Component Value Date    TRIG 161 (H) 04/14/2023    CHOL 112 04/14/2023    HDL 26.6 (A) 04/14/2023     Lab Results   Component Value Date    HGBA1C 6.1 12/10/2019         Current Outpatient Medications   Medication Instructions    albuterol 90 mcg/actuation inhaler 2 puffs, inhalation, Every 6 hours PRN    atorvastatin (LIPITOR) 40 mg, oral, Nightly    carvedilol (Coreg) 6.25 mg tablet 1 tablet, oral, 2 times daily (morning and late afternoon)    cyanocobalamin (vitamin B-12) (VITAMIN B-12) 1,000 mcg, oral, Daily    Eliquis 5 mg, oral, 2 times daily    ergocalciferol (VITAMIN D-2) 1.25 mg,  oral, Once Weekly    famotidine (PEPCID) 40 mg, oral, 2 times daily    fluticasone-umeclidin-vilanter (Trelegy Ellipta) 100-62.5-25 mcg blister with device 1 puff, inhalation, Daily RT    methIMAzole (TAPAZOLE) 5 mg, oral, Daily    montelukast (SINGULAIR) 10 mg, oral, Nightly    oxygen (O2) gas therapy 1 each, inhalation, Continuous, 5 Liters continuous    Mary Grace-Darryl Rx 1- mg-mg-mcg tablet 1 tablet, oral, Daily    torsemide (DEMADEX) 20 mg, oral, 2 times daily            Assessment/Plan   Problem List Items Addressed This Visit    None    Afib  No chest pain or SOB  No feelings of palpitations  Continue with coreg and eliquis   Approved for PAP for his eliquis and already received first 3 month supply   COPD  Completed all antibiotics and steroids while inpatient  Breathing has been fine since being home  Still continuing to wear 3.5 L  Did not want to be set up on hallie but has his own pulse ox and is checking it regularly to report to nursing so we can try to wean back down to baseline   Continue trelegy daily --> also approved with PAP and received 3 month supply too   Albuterol as needed   CHF  Current GDMT --> carvedilol   Not able to start on others due to kidney function  Does home dialysis 4 days a week   Continue torsemide   Also taking renal cap daily   Continue daily weights and BP's and keep log   BP's due tend to run low while doing his dialysis sessions     Overall, he has been doing very well since being discharged so we will plan to graduate early from [facility] today. He has his follow ups scheduled and refills on medications. He is also going to see pcp later today. We discussed he is able to call any time though with any questions or concerns since nursing is available 24/7.     Follow Up: as needed     Continue all meds under the continuation of care with the referring provider and clinical pharmacy team.    Zechariah Williamson, Meghan     Verbal consent to manage patient's drug therapy was obtained  from the patient. They were informed they may decline to participate or withdraw from participation in pharmacy services at any time.

## 2024-05-16 ENCOUNTER — HOME CARE VISIT (OUTPATIENT)
Dept: HOME HEALTH SERVICES | Facility: HOME HEALTH | Age: 74
End: 2024-05-16
Payer: MEDICARE

## 2024-05-16 VITALS
HEART RATE: 76 BPM | SYSTOLIC BLOOD PRESSURE: 124 MMHG | RESPIRATION RATE: 18 BRPM | DIASTOLIC BLOOD PRESSURE: 50 MMHG | OXYGEN SATURATION: 99 % | TEMPERATURE: 97.5 F

## 2024-05-16 PROCEDURE — G0152 HHCP-SERV OF OT,EA 15 MIN: HCPCS | Mod: HHH

## 2024-05-16 PROCEDURE — G0151 HHCP-SERV OF PT,EA 15 MIN: HCPCS | Mod: HHH

## 2024-05-16 PROCEDURE — 1090000001 HH PPS REVENUE CREDIT

## 2024-05-16 PROCEDURE — 1090000002 HH PPS REVENUE DEBIT

## 2024-05-16 SDOH — HEALTH STABILITY: MENTAL HEALTH: SMOKING IN HOME: 0

## 2024-05-16 SDOH — HEALTH STABILITY: PHYSICAL HEALTH
EXERCISE COMMENTS: N AND KNEE FLEXION, AND SEATED PILLOW SQUEEZE HIP ADDUCTION, SEATED ABDOMINAL BRACING (3 SECOND HOLD) 2 SET 10 REPS EACH.  PATIENT REQUIRED VERBAL AND VISUAL CUING FOR PROPER EXECUTION OF EXERCISES.  PT HAS BEEN PROVIDED WRITTEN HOME EX HANDOUTS.

## 2024-05-16 SDOH — ECONOMIC STABILITY: HOUSING INSECURITY: EVIDENCE OF SMOKING MATERIAL: 0

## 2024-05-16 SDOH — HEALTH STABILITY: PHYSICAL HEALTH
EXERCISE COMMENTS: PERFORMED 4:00 MINS SEATED PEDALLING WARM-UP FOLLOWED BY INSTRUCTION AND RETURN DEMONSTRATION OF THERAPUETIC EXERCISES INCLUDING SEATED 1 1/2 LB KNEE EXTENSIONS, SEATED 1 1/2 LB MARCHING, SEATED HEEL AND TOE RAISES, SEATED BLUE THERABAND HIP ABDUCTIO

## 2024-05-16 SDOH — HEALTH STABILITY: PHYSICAL HEALTH: EXERCISE TYPE: TRUNK AND BLE STRENGTHENING EXERCISES.

## 2024-05-16 ASSESSMENT — ENCOUNTER SYMPTOMS
PAIN: PT REPORTS NO COMPLAINT OF PAIN.
PAIN SEVERITY GOAL: 0/10
PERSON REPORTING PAIN: PATIENT
PERSON REPORTING PAIN: PATIENT
LOWEST PAIN SEVERITY IN PAST 24 HOURS: 0/10
DENIES PAIN: 1
HIGHEST PAIN SEVERITY IN PAST 24 HOURS: 0/10
DENIES PAIN: 1

## 2024-05-16 ASSESSMENT — PAIN SCALES - PAIN ASSESSMENT IN ADVANCED DEMENTIA (PAINAD)
NEGVOCALIZATION: 0 - NONE.
BODYLANGUAGE: 0
BREATHING: 0
BODYLANGUAGE: 0 - RELAXED.
CONSOLABILITY: 0 - NO NEED TO CONSOLE.
FACIALEXPRESSION: 0 - SMILING OR INEXPRESSIVE.
CONSOLABILITY: 0
TOTALSCORE: 0
FACIALEXPRESSION: 0
NEGVOCALIZATION: 0

## 2024-05-16 ASSESSMENT — ACTIVITIES OF DAILY LIVING (ADL): AMBULATION ASSISTANCE ON FLAT SURFACES: 1

## 2024-05-17 ENCOUNTER — HOME CARE VISIT (OUTPATIENT)
Dept: HOME HEALTH SERVICES | Facility: HOME HEALTH | Age: 74
End: 2024-05-17
Payer: MEDICARE

## 2024-05-17 VITALS
HEART RATE: 80 BPM | DIASTOLIC BLOOD PRESSURE: 60 MMHG | TEMPERATURE: 98.6 F | OXYGEN SATURATION: 99 % | RESPIRATION RATE: 16 BRPM | SYSTOLIC BLOOD PRESSURE: 122 MMHG

## 2024-05-17 PROCEDURE — G0300 HHS/HOSPICE OF LPN EA 15 MIN: HCPCS | Mod: HHH

## 2024-05-17 PROCEDURE — 1090000001 HH PPS REVENUE CREDIT

## 2024-05-17 PROCEDURE — 1090000002 HH PPS REVENUE DEBIT

## 2024-05-17 SDOH — HEALTH STABILITY: MENTAL HEALTH: SMOKING IN HOME: 0

## 2024-05-17 SDOH — ECONOMIC STABILITY: HOUSING INSECURITY: EVIDENCE OF SMOKING MATERIAL: 0

## 2024-05-17 ASSESSMENT — ENCOUNTER SYMPTOMS
DENIES PAIN: 1
HIGHEST PAIN SEVERITY IN PAST 24 HOURS: 0/10
APPETITE LEVEL: GOOD
PAIN SEVERITY GOAL: 0/10
STOOL FREQUENCY: DAILY
PERSON REPORTING PAIN: PATIENT
LAST BOWEL MOVEMENT: 66977
BOWEL PATTERN NORMAL: 1
CHANGE IN APPETITE: UNCHANGED
LOWEST PAIN SEVERITY IN PAST 24 HOURS: 0/10
SHORTNESS OF BREATH: 1
SUBJECTIVE PAIN PROGRESSION: UNCHANGED

## 2024-05-17 ASSESSMENT — PAIN SCALES - PAIN ASSESSMENT IN ADVANCED DEMENTIA (PAINAD)
FACIALEXPRESSION: 0 - SMILING OR INEXPRESSIVE.
TOTALSCORE: 0
BREATHING: 0
CONSOLABILITY: 0 - NO NEED TO CONSOLE.
CONSOLABILITY: 0
BODYLANGUAGE: 0
FACIALEXPRESSION: 0
BODYLANGUAGE: 0 - RELAXED.
NEGVOCALIZATION: 0 - NONE.
NEGVOCALIZATION: 0

## 2024-05-18 PROCEDURE — 1090000001 HH PPS REVENUE CREDIT

## 2024-05-18 PROCEDURE — 1090000002 HH PPS REVENUE DEBIT

## 2024-05-19 PROCEDURE — 1090000001 HH PPS REVENUE CREDIT

## 2024-05-19 PROCEDURE — 1090000002 HH PPS REVENUE DEBIT

## 2024-05-20 PROCEDURE — 1090000002 HH PPS REVENUE DEBIT

## 2024-05-20 PROCEDURE — 1090000001 HH PPS REVENUE CREDIT

## 2024-05-21 ENCOUNTER — HOME CARE VISIT (OUTPATIENT)
Dept: HOME HEALTH SERVICES | Facility: HOME HEALTH | Age: 74
End: 2024-05-21
Payer: MEDICARE

## 2024-05-21 VITALS
DIASTOLIC BLOOD PRESSURE: 48 MMHG | SYSTOLIC BLOOD PRESSURE: 120 MMHG | RESPIRATION RATE: 18 BRPM | OXYGEN SATURATION: 98 % | HEART RATE: 72 BPM | TEMPERATURE: 97.2 F

## 2024-05-21 PROCEDURE — 1090000001 HH PPS REVENUE CREDIT

## 2024-05-21 PROCEDURE — G0151 HHCP-SERV OF PT,EA 15 MIN: HCPCS | Mod: HHH

## 2024-05-21 PROCEDURE — 1090000002 HH PPS REVENUE DEBIT

## 2024-05-21 SDOH — HEALTH STABILITY: PHYSICAL HEALTH: EXERCISE TYPE: BLE STRENGTHENING

## 2024-05-21 SDOH — HEALTH STABILITY: PHYSICAL HEALTH
EXERCISE COMMENTS: PERFORMED 4:30 MINS SEATED PEDALLING WARM-UP FOLLOWED BY INSTRUCTION AND RETURN DEMONSTRATION OF THERAPUETIC EXERCISES INCLUDING SEATED 2 LB KNEE EXTENSIONS, SEATED 2 LB MARCHING, SEATED HEEL AND TOE RAISES, SEATED BLUE THERABAND HIP ABDUCTION AND KN

## 2024-05-21 SDOH — HEALTH STABILITY: PHYSICAL HEALTH
EXERCISE COMMENTS: EE FLEXION, AND SEATED PILLOW SQUEEZE HIP ADDUCTION, SEATED ABDOMINAL BRACING (3 SECOND HOLD) 2 SET 10 REPS EACH.  PATIENT REQUIRED VERBAL AND VISUAL CUING FOR PROPER EXECUTION OF EXERCISES.  PT HAS BEEN PROVIDED WRITTEN HOME EX HANDOUTS.

## 2024-05-21 SDOH — HEALTH STABILITY: MENTAL HEALTH: SMOKING IN HOME: 0

## 2024-05-21 SDOH — ECONOMIC STABILITY: HOUSING INSECURITY: EVIDENCE OF SMOKING MATERIAL: 0

## 2024-05-21 ASSESSMENT — ENCOUNTER SYMPTOMS
HIGHEST PAIN SEVERITY IN PAST 24 HOURS: 0/10
LOWEST PAIN SEVERITY IN PAST 24 HOURS: 0/10
PERSON REPORTING PAIN: PATIENT
DENIES PAIN: 1
PAIN SEVERITY GOAL: 0/10

## 2024-05-21 ASSESSMENT — PAIN SCALES - PAIN ASSESSMENT IN ADVANCED DEMENTIA (PAINAD)
CONSOLABILITY: 0 - NO NEED TO CONSOLE.
FACIALEXPRESSION: 0 - SMILING OR INEXPRESSIVE.
NEGVOCALIZATION: 0 - NONE.
BREATHING: 0
TOTALSCORE: 0
CONSOLABILITY: 0
NEGVOCALIZATION: 0
BODYLANGUAGE: 0 - RELAXED.
BODYLANGUAGE: 0
FACIALEXPRESSION: 0

## 2024-05-21 ASSESSMENT — ACTIVITIES OF DAILY LIVING (ADL): AMBULATION ASSISTANCE ON FLAT SURFACES: 1

## 2024-05-22 ENCOUNTER — HOME CARE VISIT (OUTPATIENT)
Dept: HOME HEALTH SERVICES | Facility: HOME HEALTH | Age: 74
End: 2024-05-22
Payer: MEDICARE

## 2024-05-22 VITALS
TEMPERATURE: 98.2 F | OXYGEN SATURATION: 99 % | DIASTOLIC BLOOD PRESSURE: 58 MMHG | HEART RATE: 78 BPM | SYSTOLIC BLOOD PRESSURE: 132 MMHG | RESPIRATION RATE: 18 BRPM

## 2024-05-22 PROCEDURE — 1090000001 HH PPS REVENUE CREDIT

## 2024-05-22 PROCEDURE — G0300 HHS/HOSPICE OF LPN EA 15 MIN: HCPCS | Mod: HHH

## 2024-05-22 PROCEDURE — 1090000002 HH PPS REVENUE DEBIT

## 2024-05-22 ASSESSMENT — ENCOUNTER SYMPTOMS
HIGHEST PAIN SEVERITY IN PAST 24 HOURS: 0/10
LOWEST PAIN SEVERITY IN PAST 24 HOURS: 0/10
SUBJECTIVE PAIN PROGRESSION: UNCHANGED
PAIN SEVERITY GOAL: 0/10
DENIES PAIN: 1
CHANGE IN APPETITE: UNCHANGED
PERSON REPORTING PAIN: PATIENT
APPETITE LEVEL: GOOD

## 2024-05-22 NOTE — HOME HEALTH
Pt on home dialysis 4 days a week mon tues thurs fri, stated his wife assist and is doing well with dialysis at home.  Port is intact no dressing or cap changes today.  Pt is weaning himself off of oxygen per MD and has a pulse ox to check.

## 2024-05-23 ENCOUNTER — HOME CARE VISIT (OUTPATIENT)
Dept: HOME HEALTH SERVICES | Facility: HOME HEALTH | Age: 74
End: 2024-05-23
Payer: MEDICARE

## 2024-05-23 VITALS
TEMPERATURE: 97.2 F | RESPIRATION RATE: 16 BRPM | DIASTOLIC BLOOD PRESSURE: 46 MMHG | HEART RATE: 72 BPM | SYSTOLIC BLOOD PRESSURE: 118 MMHG | OXYGEN SATURATION: 98 %

## 2024-05-23 PROCEDURE — G0151 HHCP-SERV OF PT,EA 15 MIN: HCPCS | Mod: HHH

## 2024-05-23 PROCEDURE — 1090000001 HH PPS REVENUE CREDIT

## 2024-05-23 PROCEDURE — 1090000002 HH PPS REVENUE DEBIT

## 2024-05-23 SDOH — HEALTH STABILITY: MENTAL HEALTH: SMOKING IN HOME: 0

## 2024-05-23 SDOH — HEALTH STABILITY: PHYSICAL HEALTH: EXERCISE TYPE: TRUNK AND BLE STRENGTHENING

## 2024-05-23 SDOH — ECONOMIC STABILITY: HOUSING INSECURITY: EVIDENCE OF SMOKING MATERIAL: 0

## 2024-05-23 ASSESSMENT — ENCOUNTER SYMPTOMS
HIGHEST PAIN SEVERITY IN PAST 24 HOURS: 0/10
OCCASIONAL FEELINGS OF UNSTEADINESS: 0
DENIES PAIN: 1
PAIN SEVERITY GOAL: 0/10
PERSON REPORTING PAIN: PATIENT
LOWEST PAIN SEVERITY IN PAST 24 HOURS: 0/10

## 2024-05-23 ASSESSMENT — PAIN SCALES - PAIN ASSESSMENT IN ADVANCED DEMENTIA (PAINAD)
CONSOLABILITY: 0
BODYLANGUAGE: 0 - RELAXED.
CONSOLABILITY: 0 - NO NEED TO CONSOLE.
NEGVOCALIZATION: 0 - NONE.
NEGVOCALIZATION: 0
BREATHING: 0
TOTALSCORE: 0
FACIALEXPRESSION: 0 - SMILING OR INEXPRESSIVE.
FACIALEXPRESSION: 0
BODYLANGUAGE: 0

## 2024-05-23 ASSESSMENT — ACTIVITIES OF DAILY LIVING (ADL)
AMBULATION_DISTANCE/DURATION_TOLERATED: 200 FT X 1
AMBULATION ASSISTANCE ON UNEVEN SURFACES: 1
AMBULATION ASSISTANCE ON FLAT SURFACES: 1

## 2024-05-24 PROCEDURE — 1090000002 HH PPS REVENUE DEBIT

## 2024-05-24 PROCEDURE — 1090000001 HH PPS REVENUE CREDIT

## 2024-05-25 PROCEDURE — 1090000002 HH PPS REVENUE DEBIT

## 2024-05-25 PROCEDURE — 1090000001 HH PPS REVENUE CREDIT

## 2024-05-26 PROCEDURE — 1090000001 HH PPS REVENUE CREDIT

## 2024-05-26 PROCEDURE — 1090000002 HH PPS REVENUE DEBIT

## 2024-05-27 PROCEDURE — 1090000001 HH PPS REVENUE CREDIT

## 2024-05-27 PROCEDURE — 1090000002 HH PPS REVENUE DEBIT

## 2024-05-28 PROCEDURE — 1090000002 HH PPS REVENUE DEBIT

## 2024-05-28 PROCEDURE — 1090000001 HH PPS REVENUE CREDIT

## 2024-05-29 ENCOUNTER — PATIENT OUTREACH (OUTPATIENT)
Dept: PRIMARY CARE | Facility: CLINIC | Age: 74
End: 2024-05-29
Payer: MEDICARE

## 2024-05-29 PROCEDURE — 1090000002 HH PPS REVENUE DEBIT

## 2024-05-29 PROCEDURE — 1090000001 HH PPS REVENUE CREDIT

## 2024-05-29 NOTE — PROGRESS NOTES
Successful one month outreach to patient regarding hospitalization as patient continues TCM program.   At time of outreach call the patient feels as if their condition has improved since initial visit with PCP or specialist.  Questions or concerns addressed at this time with patient.   Provided contact information to patient if any further non-emergent needs arise.

## 2024-05-30 ENCOUNTER — HOME CARE VISIT (OUTPATIENT)
Dept: HOME HEALTH SERVICES | Facility: HOME HEALTH | Age: 74
End: 2024-05-30
Payer: MEDICARE

## 2024-05-30 VITALS
HEART RATE: 82 BPM | SYSTOLIC BLOOD PRESSURE: 122 MMHG | OXYGEN SATURATION: 99 % | RESPIRATION RATE: 20 BRPM | DIASTOLIC BLOOD PRESSURE: 86 MMHG

## 2024-05-30 PROCEDURE — 0023 HH SOC

## 2024-05-30 PROCEDURE — 1090000001 HH PPS REVENUE CREDIT

## 2024-05-30 PROCEDURE — G0299 HHS/HOSPICE OF RN EA 15 MIN: HCPCS | Mod: HHH

## 2024-05-30 PROCEDURE — 1090000002 HH PPS REVENUE DEBIT

## 2024-05-30 PROCEDURE — 1090000003 HH PPS REVENUE ADJ

## 2024-05-30 ASSESSMENT — PAIN SCALES - PAIN ASSESSMENT IN ADVANCED DEMENTIA (PAINAD)
CONSOLABILITY: 0
BREATHING: 0
CONSOLABILITY: 0 - NO NEED TO CONSOLE.
BODYLANGUAGE: 0 - RELAXED.
TOTALSCORE: 0
FACIALEXPRESSION: 0 - SMILING OR INEXPRESSIVE.
BODYLANGUAGE: 0
NEGVOCALIZATION: 0 - NONE.
NEGVOCALIZATION: 0
FACIALEXPRESSION: 0

## 2024-05-30 ASSESSMENT — ACTIVITIES OF DAILY LIVING (ADL)
HOME_HEALTH_OASIS: 00
OASIS_M1830: 00

## 2024-05-30 ASSESSMENT — ENCOUNTER SYMPTOMS: DENIES PAIN: 1

## 2024-05-30 NOTE — HOME HEALTH
pt in good spirits,improving daily, down to 3 L of O2 trying to wean off again.  lungs are clear      pt conintues dialysis daily

## 2024-07-16 ENCOUNTER — TELEPHONE (OUTPATIENT)
Dept: PRIMARY CARE | Facility: CLINIC | Age: 74
End: 2024-07-16
Payer: MEDICARE

## 2024-07-16 DIAGNOSIS — J30.2 PERENNIAL ALLERGIC RHINITIS WITH SEASONAL VARIATION: ICD-10-CM

## 2024-07-16 DIAGNOSIS — J30.89 PERENNIAL ALLERGIC RHINITIS WITH SEASONAL VARIATION: ICD-10-CM

## 2024-07-16 RX ORDER — MONTELUKAST SODIUM 10 MG/1
10 TABLET ORAL NIGHTLY
Qty: 90 TABLET | Refills: 1 | Status: SHIPPED | OUTPATIENT
Start: 2024-07-16

## 2024-07-16 NOTE — TELEPHONE ENCOUNTER
Med Refill   montelukast (Singulair) 10 mg tablet [[id number]]     [id number] - [place] [place] - [address]  [address], [facility] [address]  Phone: [phone]  Fax: [phone]  GERALDO #: --     LOV: 5/15/24     NOV: 8/14/24

## 2024-07-22 ENCOUNTER — TELEPHONE (OUTPATIENT)
Dept: PRIMARY CARE | Facility: CLINIC | Age: 74
End: 2024-07-22
Payer: MEDICARE

## 2024-07-22 DIAGNOSIS — E05.00 GOITER WITH HYPERTHYROIDISM: ICD-10-CM

## 2024-07-22 DIAGNOSIS — J44.9 COPD, SEVERE (MULTI): ICD-10-CM

## 2024-07-22 DIAGNOSIS — D51.3 OTHER DIETARY VITAMIN B12 DEFICIENCY ANEMIA: ICD-10-CM

## 2024-07-22 DIAGNOSIS — I70.0 ATHEROSCLEROSIS OF AORTA (CMS-HCC): ICD-10-CM

## 2024-07-22 DIAGNOSIS — E78.5 DYSLIPIDEMIA, GOAL LDL BELOW 70: ICD-10-CM

## 2024-07-22 DIAGNOSIS — N18.6 END STAGE RENAL DISEASE (MULTI): ICD-10-CM

## 2024-07-22 DIAGNOSIS — E55.9 VITAMIN D DEFICIENCY: ICD-10-CM

## 2024-07-22 RX ORDER — METHIMAZOLE 5 MG/1
5 TABLET ORAL DAILY
Qty: 30 TABLET | Refills: 3 | Status: SHIPPED | OUTPATIENT
Start: 2024-07-22

## 2024-07-22 NOTE — TELEPHONE ENCOUNTER
Med Refill   methIMAzole (Tapazole) 5 mg tablet [[id number]]     [id number] - [place], [place] - [address]  [address], [facility] [address]  Phone: [phone]  Fax: [phone]  GERALDO #: --     LOV: 5/15/24    NOV: 8/14/24

## 2024-07-23 PROCEDURE — RXMED WILLOW AMBULATORY MEDICATION CHARGE

## 2024-07-25 ENCOUNTER — PATIENT OUTREACH (OUTPATIENT)
Dept: PRIMARY CARE | Facility: CLINIC | Age: 74
End: 2024-07-25
Payer: MEDICARE

## 2024-07-25 ENCOUNTER — PHARMACY VISIT (OUTPATIENT)
Dept: PHARMACY | Facility: CLINIC | Age: 74
End: 2024-07-25
Payer: MEDICARE

## 2024-07-25 NOTE — PROGRESS NOTES
90 day outreach complete. Pt questions and concerns addressed. Pt states they are doing well. Pt has graduated from [facility] program.

## 2024-07-26 PROBLEM — T83.9XXA URINARY CATHETER COMPLICATION (CMS-HCC): Status: ACTIVE | Noted: 2023-06-20

## 2024-07-26 PROBLEM — A41.9 SEPSIS (MULTI): Status: ACTIVE | Noted: 2023-06-27

## 2024-07-26 PROBLEM — R00.0 TACHYCARDIA: Status: ACTIVE | Noted: 2023-08-06

## 2024-07-26 PROBLEM — R33.8 ACUTE RETENTION OF URINE: Status: ACTIVE | Noted: 2023-06-17

## 2024-07-26 PROBLEM — I10 HYPERTENSION: Status: ACTIVE | Noted: 2018-11-19

## 2024-07-26 PROBLEM — Z99.2 END STAGE RENAL FAILURE ON DIALYSIS (MULTI): Status: ACTIVE | Noted: 2020-05-27

## 2024-07-26 PROBLEM — I48.92 PAROXYSMAL ATRIAL FLUTTER (MULTI): Status: ACTIVE | Noted: 2018-08-10

## 2024-07-26 PROBLEM — E78.5 HYPERLIPIDEMIA: Status: ACTIVE | Noted: 2018-08-10

## 2024-07-26 PROBLEM — J96.11 CHRONIC RESPIRATORY FAILURE WITH HYPOXIA (MULTI): Status: ACTIVE | Noted: 2023-01-20

## 2024-07-31 ENCOUNTER — TELEPHONE (OUTPATIENT)
Dept: PRIMARY CARE | Facility: CLINIC | Age: 74
End: 2024-07-31
Payer: MEDICARE

## 2024-07-31 DIAGNOSIS — D51.3 OTHER DIETARY VITAMIN B12 DEFICIENCY ANEMIA: ICD-10-CM

## 2024-07-31 RX ORDER — ACETAMINOPHEN, DEXTROMETHORPHAN HBR, DOXYLAMINE SUCCINATE, PHENYLEPHRINE HCL 650; 20; 12.5; 1 MG/30ML; MG/30ML; MG/30ML; MG/30ML
1 SOLUTION ORAL DAILY
Qty: 90 TABLET | Refills: 0 | Status: SHIPPED | OUTPATIENT
Start: 2024-07-31

## 2024-07-31 NOTE — TELEPHONE ENCOUNTER
Med Refill   cyanocobalamin, vitamin B-12, (Vitamin B-12) 1,000 mcg tablet extended release [[id number]]     [id number] - [place], [place] - [address]  [address], [place] [address]  Phone: [phone]  Fax: [phone]     LOV: 5/15/24      NOV: 8/14/24

## 2024-08-06 ENCOUNTER — APPOINTMENT (OUTPATIENT)
Dept: CARDIOLOGY | Facility: CLINIC | Age: 74
End: 2024-08-06
Payer: MEDICARE

## 2024-08-12 ENCOUNTER — LAB (OUTPATIENT)
Dept: LAB | Facility: LAB | Age: 74
End: 2024-08-12
Payer: MEDICARE

## 2024-08-12 DIAGNOSIS — E06.3 HYPERTHYROIDISM WITH HASHIMOTO DISEASE: ICD-10-CM

## 2024-08-12 DIAGNOSIS — N18.6 ESRD (END STAGE RENAL DISEASE) ON DIALYSIS (MULTI): ICD-10-CM

## 2024-08-12 DIAGNOSIS — Z99.2 ESRD (END STAGE RENAL DISEASE) ON DIALYSIS (MULTI): ICD-10-CM

## 2024-08-12 DIAGNOSIS — J44.9 COPD, SEVERE (MULTI): ICD-10-CM

## 2024-08-12 DIAGNOSIS — E05.80 HYPERTHYROIDISM WITH HASHIMOTO DISEASE: ICD-10-CM

## 2024-08-12 DIAGNOSIS — R73.02 IGT (IMPAIRED GLUCOSE TOLERANCE): ICD-10-CM

## 2024-08-12 LAB
25(OH)D3 SERPL-MCNC: 41 NG/ML (ref 30–100)
ALBUMIN SERPL BCP-MCNC: 4 G/DL (ref 3.4–5)
ALP SERPL-CCNC: 56 U/L (ref 33–136)
ALT SERPL W P-5'-P-CCNC: 11 U/L (ref 10–52)
ANION GAP SERPL CALC-SCNC: 15 MMOL/L (ref 10–20)
AST SERPL W P-5'-P-CCNC: 11 U/L (ref 9–39)
BASOPHILS # BLD AUTO: 0.07 X10*3/UL (ref 0–0.1)
BASOPHILS NFR BLD AUTO: 1 %
BILIRUB SERPL-MCNC: 0.3 MG/DL (ref 0–1.2)
BUN SERPL-MCNC: 61 MG/DL (ref 6–23)
CALCIUM SERPL-MCNC: 8.9 MG/DL (ref 8.6–10.3)
CHLORIDE SERPL-SCNC: 101 MMOL/L (ref 98–107)
CHOLEST SERPL-MCNC: 105 MG/DL (ref 0–199)
CHOLESTEROL/HDL RATIO: 2.6
CO2 SERPL-SCNC: 30 MMOL/L (ref 21–32)
CREAT SERPL-MCNC: 7.43 MG/DL (ref 0.5–1.3)
EGFRCR SERPLBLD CKD-EPI 2021: 7 ML/MIN/1.73M*2
EOSINOPHIL # BLD AUTO: 1.03 X10*3/UL (ref 0–0.4)
EOSINOPHIL NFR BLD AUTO: 14.1 %
ERYTHROCYTE [DISTWIDTH] IN BLOOD BY AUTOMATED COUNT: 14.1 % (ref 11.5–14.5)
EST. AVERAGE GLUCOSE BLD GHB EST-MCNC: 100 MG/DL
GLUCOSE SERPL-MCNC: 81 MG/DL (ref 74–99)
HBA1C MFR BLD: 5.1 %
HCT VFR BLD AUTO: 27.6 % (ref 41–52)
HDLC SERPL-MCNC: 40.1 MG/DL
HGB BLD-MCNC: 8.5 G/DL (ref 13.5–17.5)
IMM GRANULOCYTES # BLD AUTO: 0.03 X10*3/UL (ref 0–0.5)
IMM GRANULOCYTES NFR BLD AUTO: 0.4 % (ref 0–0.9)
IRON SATN MFR SERPL: 36 % (ref 25–45)
IRON SERPL-MCNC: 86 UG/DL (ref 35–150)
LDLC SERPL CALC-MCNC: 46 MG/DL
LYMPHOCYTES # BLD AUTO: 1.21 X10*3/UL (ref 0.8–3)
LYMPHOCYTES NFR BLD AUTO: 16.6 %
MCH RBC QN AUTO: 30.4 PG (ref 26–34)
MCHC RBC AUTO-ENTMCNC: 30.8 G/DL (ref 32–36)
MCV RBC AUTO: 99 FL (ref 80–100)
MONOCYTES # BLD AUTO: 0.83 X10*3/UL (ref 0.05–0.8)
MONOCYTES NFR BLD AUTO: 11.4 %
NEUTROPHILS # BLD AUTO: 4.12 X10*3/UL (ref 1.6–5.5)
NEUTROPHILS NFR BLD AUTO: 56.5 %
NON HDL CHOLESTEROL: 65 MG/DL (ref 0–149)
NRBC BLD-RTO: 0 /100 WBCS (ref 0–0)
PLATELET # BLD AUTO: 182 X10*3/UL (ref 150–450)
POTASSIUM SERPL-SCNC: 5 MMOL/L (ref 3.5–5.3)
PROT SERPL-MCNC: 6.1 G/DL (ref 6.4–8.2)
PTH-INTACT SERPL-MCNC: 436.7 PG/ML (ref 18.5–88)
RBC # BLD AUTO: 2.8 X10*6/UL (ref 4.5–5.9)
SODIUM SERPL-SCNC: 141 MMOL/L (ref 136–145)
TIBC SERPL-MCNC: 240 UG/DL (ref 240–445)
TRIGL SERPL-MCNC: 94 MG/DL (ref 0–149)
TSH SERPL-ACNC: 1.48 MIU/L (ref 0.44–3.98)
UIBC SERPL-MCNC: 154 UG/DL (ref 110–370)
VIT B12 SERPL-MCNC: 998 PG/ML (ref 211–911)
VLDL: 19 MG/DL (ref 0–40)
WBC # BLD AUTO: 7.3 X10*3/UL (ref 4.4–11.3)

## 2024-08-12 PROCEDURE — 82306 VITAMIN D 25 HYDROXY: CPT

## 2024-08-12 PROCEDURE — 84443 ASSAY THYROID STIM HORMONE: CPT

## 2024-08-12 PROCEDURE — 83970 ASSAY OF PARATHORMONE: CPT

## 2024-08-12 PROCEDURE — 36415 COLL VENOUS BLD VENIPUNCTURE: CPT

## 2024-08-12 PROCEDURE — 80053 COMPREHEN METABOLIC PANEL: CPT

## 2024-08-12 PROCEDURE — 82607 VITAMIN B-12: CPT

## 2024-08-12 PROCEDURE — 83036 HEMOGLOBIN GLYCOSYLATED A1C: CPT

## 2024-08-12 PROCEDURE — 85025 COMPLETE CBC W/AUTO DIFF WBC: CPT

## 2024-08-12 PROCEDURE — 83540 ASSAY OF IRON: CPT

## 2024-08-12 PROCEDURE — 83550 IRON BINDING TEST: CPT

## 2024-08-12 PROCEDURE — 80061 LIPID PANEL: CPT

## 2024-08-14 ENCOUNTER — APPOINTMENT (OUTPATIENT)
Dept: PRIMARY CARE | Facility: CLINIC | Age: 74
End: 2024-08-14
Payer: MEDICARE

## 2024-08-14 VITALS
DIASTOLIC BLOOD PRESSURE: 60 MMHG | OXYGEN SATURATION: 92 % | HEIGHT: 74 IN | SYSTOLIC BLOOD PRESSURE: 138 MMHG | WEIGHT: 184 LBS | BODY MASS INDEX: 23.61 KG/M2 | HEART RATE: 73 BPM

## 2024-08-14 DIAGNOSIS — Z99.2 ESRD (END STAGE RENAL DISEASE) ON DIALYSIS (MULTI): ICD-10-CM

## 2024-08-14 DIAGNOSIS — I70.0 ATHEROSCLEROSIS OF AORTA (CMS-HCC): Primary | ICD-10-CM

## 2024-08-14 DIAGNOSIS — N18.6 END STAGE RENAL FAILURE ON DIALYSIS (MULTI): ICD-10-CM

## 2024-08-14 DIAGNOSIS — Z99.2 END STAGE RENAL FAILURE ON DIALYSIS (MULTI): ICD-10-CM

## 2024-08-14 DIAGNOSIS — N25.81 SECONDARY HYPERPARATHYROIDISM OF RENAL ORIGIN (MULTI): ICD-10-CM

## 2024-08-14 DIAGNOSIS — N18.6 ESRD (END STAGE RENAL DISEASE) ON DIALYSIS (MULTI): ICD-10-CM

## 2024-08-14 DIAGNOSIS — N18.5 ANEMIA OF CHRONIC RENAL FAILURE, STAGE 5 (MULTI): ICD-10-CM

## 2024-08-14 DIAGNOSIS — J44.9 COPD, SEVERE (MULTI): ICD-10-CM

## 2024-08-14 DIAGNOSIS — I27.81 COR PULMONALE (CHRONIC) (MULTI): ICD-10-CM

## 2024-08-14 DIAGNOSIS — I48.0 PAROXYSMAL A-FIB (MULTI): ICD-10-CM

## 2024-08-14 DIAGNOSIS — D63.1 ANEMIA OF CHRONIC RENAL FAILURE, STAGE 5 (MULTI): ICD-10-CM

## 2024-08-14 DIAGNOSIS — I48.92 PAROXYSMAL ATRIAL FLUTTER (MULTI): ICD-10-CM

## 2024-08-14 DIAGNOSIS — J96.11 CHRONIC RESPIRATORY FAILURE WITH HYPOXIA (MULTI): ICD-10-CM

## 2024-08-14 DIAGNOSIS — E05.00 GOITER WITH HYPERTHYROIDISM: ICD-10-CM

## 2024-08-14 PROBLEM — G45.3 AMAUROSIS FUGAX: Status: RESOLVED | Noted: 2023-04-17 | Resolved: 2024-08-14

## 2024-08-14 PROBLEM — T83.9XXA URINARY CATHETER COMPLICATION (CMS-HCC): Status: RESOLVED | Noted: 2023-06-20 | Resolved: 2024-08-14

## 2024-08-14 PROBLEM — E06.3 HYPERTHYROIDISM WITH HASHIMOTO DISEASE: Status: RESOLVED | Noted: 2024-05-15 | Resolved: 2024-08-14

## 2024-08-14 PROBLEM — E05.80 HYPERTHYROIDISM WITH HASHIMOTO DISEASE: Status: RESOLVED | Noted: 2024-05-15 | Resolved: 2024-08-14

## 2024-08-14 PROBLEM — R33.8 ACUTE RETENTION OF URINE: Status: RESOLVED | Noted: 2023-06-17 | Resolved: 2024-08-14

## 2024-08-14 PROCEDURE — 3075F SYST BP GE 130 - 139MM HG: CPT | Performed by: INTERNAL MEDICINE

## 2024-08-14 PROCEDURE — 1159F MED LIST DOCD IN RCRD: CPT | Performed by: INTERNAL MEDICINE

## 2024-08-14 PROCEDURE — 3078F DIAST BP <80 MM HG: CPT | Performed by: INTERNAL MEDICINE

## 2024-08-14 PROCEDURE — 1036F TOBACCO NON-USER: CPT | Performed by: INTERNAL MEDICINE

## 2024-08-14 PROCEDURE — 1160F RVW MEDS BY RX/DR IN RCRD: CPT | Performed by: INTERNAL MEDICINE

## 2024-08-14 PROCEDURE — 99214 OFFICE O/P EST MOD 30 MIN: CPT | Performed by: INTERNAL MEDICINE

## 2024-08-14 NOTE — PROGRESS NOTES
"Subjective   Reason for Visit: Renard Ward is an 74 y.o. male here for a fu visit.     Past Medical, Surgical, and Family History reviewed and updated in chart.         HPI    Patient Care Team:  Leandro Bassett DO as PCP - General     Review of Systems   All other systems reviewed and are negative.      Objective   Vitals:  /60   Pulse 73   Ht 1.88 m (6' 2\")   Wt 83.5 kg (184 lb)   SpO2 92%   BMI 23.62 kg/m²       Physical Exam  Vitals and nursing note reviewed.   Constitutional:       General: He is not in acute distress.     Appearance: Normal appearance. He is well-developed. He is not toxic-appearing.   HENT:      Head: Normocephalic and atraumatic.      Right Ear: Tympanic membrane and external ear normal.      Left Ear: Tympanic membrane and external ear normal.      Nose: Nose normal.      Mouth/Throat:      Mouth: Mucous membranes are moist.      Pharynx: Oropharynx is clear. No oropharyngeal exudate or posterior oropharyngeal erythema.      Tonsils: No tonsillar exudate. 2+ on the right. 2+ on the left.   Eyes:      Extraocular Movements: Extraocular movements intact.      Conjunctiva/sclera: Conjunctivae normal.   Cardiovascular:      Rate and Rhythm: Normal rate and regular rhythm.      Pulses: Normal pulses.      Heart sounds: Normal heart sounds. No murmur heard.  Pulmonary:      Effort: Pulmonary effort is normal.      Breath sounds: Normal breath sounds.   Abdominal:      General: Abdomen is flat. Bowel sounds are normal.      Palpations: Abdomen is soft.   Musculoskeletal:      Cervical back: Neck supple.   Feet:      Right foot:      Skin integrity: Skin integrity normal. No ulcer, blister, skin breakdown, erythema, warmth or callus.      Toenail Condition: Right toenails are normal.      Left foot:      Skin integrity: Skin integrity normal. No ulcer, blister, skin breakdown, erythema, warmth or callus.      Toenail Condition: Left toenails are normal.   Lymphadenopathy:      " Cervical: No cervical adenopathy.   Skin:     General: Skin is warm and dry.      Capillary Refill: Capillary refill takes more than 3 seconds.      Findings: No rash.   Neurological:      Mental Status: He is alert. Mental status is at baseline.      Sensory: Sensation is intact.   Psychiatric:         Mood and Affect: Mood normal.         Behavior: Behavior normal.         Thought Content: Thought content normal.         Judgment: Judgment normal.         Assessment/Plan   Problem List Items Addressed This Visit             ICD-10-CM    Atherosclerosis of aorta (CMS-HCC) - Primary I70.0     Continue to follow with echocardiogram with cardiologist         Paroxysmal atrial flutter (Multi) I48.92     Continue with control of rate and rhythm with carvedilol at this time         Goiter with hyperthyroidism E05.00     Clinically euthyroid on Tapazole continue to monitor closely         Chronic respiratory failure with hypoxia (Multi) J96.11     Tapering down oxygen requirements follows with pulmonologist         COPD, severe (Multi) J44.9     Improvement with Trelegy Ellipta 1 elation daily with montelukast 10 mg daily         Relevant Orders    Follow Up In [facility] - PCP - Established    Secondary hyperparathyroidism of renal origin (Multi) N25.81     Following with nephrology specialist continue Rocaltrol         Cor pulmonale (chronic) (Multi) I27.81     Stable with reduced oxygen requirements         Paroxysmal A-fib (Multi) I48.0     Rate controlled on carvedilol 6.25 mg twice a day continues on Eliquis 5 mg twice a day         Anemia of chronic renal failure, stage 5 (Multi) N18.5, D63.1    ESRD (end stage renal disease) on dialysis (Multi) N18.6, Z99.2     Receiving home hemodialysis treatments with improvement          Other Visit Diagnoses         Codes    End stage renal failure on dialysis (Multi)     N18.6, Z99.2

## 2024-08-14 NOTE — PROGRESS NOTES
"Subjective   Patient ID: Renard Ward is a 74 y.o. male who presents for Follow-up.    HPI     Review of Systems    Objective   /60   Pulse 73   Ht 1.88 m (6' 2\")   Wt 83.5 kg (184 lb)   SpO2 92%   BMI 23.62 kg/m²     Physical Exam    Assessment/Plan          "

## 2024-08-18 PROBLEM — R00.0 TACHYCARDIA: Status: RESOLVED | Noted: 2023-08-06 | Resolved: 2024-08-18

## 2024-08-18 PROBLEM — H61.21 HEARING LOSS OF RIGHT EAR DUE TO CERUMEN IMPACTION: Status: RESOLVED | Noted: 2024-05-15 | Resolved: 2024-08-18

## 2024-08-22 ENCOUNTER — APPOINTMENT (OUTPATIENT)
Dept: CARDIOLOGY | Facility: HOSPITAL | Age: 74
End: 2024-08-22
Payer: MEDICARE

## 2024-08-22 VITALS
HEART RATE: 81 BPM | BODY MASS INDEX: 22.13 KG/M2 | WEIGHT: 172.4 LBS | DIASTOLIC BLOOD PRESSURE: 54 MMHG | SYSTOLIC BLOOD PRESSURE: 132 MMHG

## 2024-08-22 DIAGNOSIS — I27.81 COR PULMONALE (CHRONIC) (MULTI): ICD-10-CM

## 2024-08-22 DIAGNOSIS — I10 PRIMARY HYPERTENSION: ICD-10-CM

## 2024-08-22 DIAGNOSIS — I48.92 PAROXYSMAL ATRIAL FLUTTER (MULTI): ICD-10-CM

## 2024-08-22 DIAGNOSIS — I70.0 ATHEROSCLEROSIS OF AORTA (CMS-HCC): Primary | ICD-10-CM

## 2024-08-22 DIAGNOSIS — E78.5 HYPERLIPIDEMIA, UNSPECIFIED HYPERLIPIDEMIA TYPE: ICD-10-CM

## 2024-08-22 DIAGNOSIS — I48.0 PAROXYSMAL A-FIB (MULTI): ICD-10-CM

## 2024-08-22 DIAGNOSIS — I50.9 CONGESTIVE HEART FAILURE, UNSPECIFIED HF CHRONICITY, UNSPECIFIED HEART FAILURE TYPE (MULTI): ICD-10-CM

## 2024-08-22 DIAGNOSIS — I65.22 STENOSIS OF LEFT EXTERNAL CAROTID ARTERY: ICD-10-CM

## 2024-08-22 DIAGNOSIS — E78.5 DYSLIPIDEMIA, GOAL LDL BELOW 70: ICD-10-CM

## 2024-08-22 PROCEDURE — 1160F RVW MEDS BY RX/DR IN RCRD: CPT | Performed by: INTERNAL MEDICINE

## 2024-08-22 PROCEDURE — 99214 OFFICE O/P EST MOD 30 MIN: CPT | Performed by: INTERNAL MEDICINE

## 2024-08-22 PROCEDURE — 3075F SYST BP GE 130 - 139MM HG: CPT | Performed by: INTERNAL MEDICINE

## 2024-08-22 PROCEDURE — 93010 ELECTROCARDIOGRAM REPORT: CPT | Performed by: INTERNAL MEDICINE

## 2024-08-22 PROCEDURE — 93005 ELECTROCARDIOGRAM TRACING: CPT | Performed by: INTERNAL MEDICINE

## 2024-08-22 PROCEDURE — 1159F MED LIST DOCD IN RCRD: CPT | Performed by: INTERNAL MEDICINE

## 2024-08-22 PROCEDURE — 1036F TOBACCO NON-USER: CPT | Performed by: INTERNAL MEDICINE

## 2024-08-22 PROCEDURE — 3078F DIAST BP <80 MM HG: CPT | Performed by: INTERNAL MEDICINE

## 2024-08-22 RX ORDER — TORSEMIDE 20 MG/1
20 TABLET ORAL 2 TIMES DAILY
COMMUNITY
Start: 2024-08-22

## 2024-08-22 RX ORDER — TORSEMIDE 20 MG/1
20 TABLET ORAL 2 TIMES DAILY
Qty: 180 TABLET | Refills: 1 | Status: SHIPPED | OUTPATIENT
Start: 2024-08-22 | End: 2024-08-22

## 2024-08-22 ASSESSMENT — ENCOUNTER SYMPTOMS
LOSS OF SENSATION IN FEET: 0
OCCASIONAL FEELINGS OF UNSTEADINESS: 0
DEPRESSION: 0

## 2024-08-22 NOTE — PROGRESS NOTES
Chief Complaint:   Annual Exam (YEARLY)     History Of Present Illness:    Renard Ward is a 74 y.o. male presenting for annual follow-up.    He has a past medical history of paroxysmal atrial flutter, atheromatous aortic disease, history of TIA and hypertension, tobacco abuse(now in remission), end-stage renal disease currently on home hemodialysis through [facility] after failing to get a AV fistula and after the peritoneal dialysis access stopped working.  He was hospitalized in April 22, 2024 with acute respiratory failure thought to be due to volume overload and at that time the dialysis volumes were adjusted.  There was a concern for aspiration pneumonia as well.  He underwent a cardiac cath that showed no significant coronary artery disease.  Thereafter, his dry weight was adjusted and he has done well.     See past medical history below-he was admitted in summer 2020 with acute kidney injury, moderate to large pericardial effusion and rapid atrial flutter. He underwent pericardiocentesis, no malignancy was detected. He was started on renal replacement therapy he underwent ALLAN guided cardioversion and was started on amiodarone but had recurrence of atrial flutter few days later.  He also had pleural effusion. This was treated medically and eventually he had atrial flutter ablation.     In July 2023, he was hospitalized with what appears to be pneumonia perhaps aspiration pneumonia. Also had vocal cord palsy and swallowing problems was transferred to another facility. He quit smoking in 2019.     Echo done on December 02, 2015 with ejection fraction of 50%, concentric LVH and mild pulmonary hypertension.  Echo July 2020- LVEF 45-50%. No residual pericardial effusion.      Event recorder from July 10, 2018 with 6 beat run of SVT at 139 bpm otherwise normal sinus rhythm.        Sleep study from July 30, 2014-absence of clinical evidence of sleep disordered breathing.     A carotid duplex 12/2/15- showed  significant stenosis of left external carotid artery but no significant stenosis of internal carotid artery(<50%).     Exercise stress MPI done on June 30, 2014-normal.      .     Last Recorded Vitals:  Vitals:    08/22/24 1326   BP: 132/54   BP Location: Right arm   Pulse: 81   Weight: 78.2 kg (172 lb 6.4 oz)       Past Medical History:  He has a past medical history of Abnormal level of blood mineral (02/22/2021), Acute diastolic (congestive) heart failure (Multi) (06/18/2020), Acute kidney failure, unspecified (CMS-HCC) (06/16/2020), Acute respiratory failure with hypoxia (Multi) (03/11/2020), Cellulitis of left toe (08/20/2020), Chronic obstructive pulmonary disease, unspecified (Multi) (09/21/2022), Chronic respiratory failure with hypoxia (Multi) (01/20/2023), Cor pulmonale (chronic) (Multi) (01/03/2022), Encounter for screening for malignant neoplasm of colon (10/19/2022), Infective pericarditis ([facility]) (06/16/2020), 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), Nicotine dependence, unspecified, uncomplicated (01/14/2020), Nontoxic multinodular goiter (11/19/2020), Obstructive sleep apnea (adult) (pediatric) (03/11/2020), Other allergic rhinitis (02/23/2021), Other fecal abnormalities (07/30/2021), Other hypersomnia (01/23/2020), Other hypersomnia (01/23/2020), Other ill-defined heart diseases (04/27/2020), Other pericardial effusion (noninflammatory) ([facility]) (06/18/2020), Other specified personal risk factors, not elsewhere classified (07/30/2020), Other specified symptoms and signs involving the circulatory and respiratory systems (10/10/2019), Personal history of diseases of the skin and subcutaneous tissue (11/19/2020), Personal history of nicotine dependence (08/18/2021), Personal history of other diseases of the digestive system, Personal history of other diseases of the nervous system and sense organs, Personal history of other endocrine, nutritional and metabolic disease  (11/19/2020), Personal history of other endocrine, nutritional and metabolic disease (10/14/2020), Personal history of other endocrine, nutritional and metabolic disease (08/20/2020), Personal history of other infectious and parasitic diseases (07/06/2021), Personal history of other infectious and parasitic diseases (02/04/2021), Personal history of other specified conditions (04/12/2016), Personal history of other specified conditions (04/07/2016), Personal history of transient ischemic attack (TIA), and cerebral infarction without residual deficits, Pleural effusion, not elsewhere classified (07/30/2020), Pleural effusion, not elsewhere classified (03/11/2020), Pneumonia due to Streptococcus pneumoniae (CMS-[facility]) (03/11/2020), Post-traumatic stress disorder, unspecified, Rash and other nonspecific skin eruption (03/25/2021), Secondary polycythemia (01/23/2020), Tinea unguium (08/25/2020), and Urinary catheter complication (CMS-HCC) (06/20/2023).    Past Surgical History:  He has a past surgical history that includes Appendectomy (04/07/2016); IR CVC tunneled (6/28/2023); and Cardiac catheterization (N/A, 4/26/2024).      Social History:  He reports that he has quit smoking. His smoking use included cigarettes. He has never used smokeless tobacco. He reports that he does not currently use alcohol. He reports that he does not use drugs.    Family History:  Family History   Family history unknown: Yes        Allergies:  Epogen [epoetin jennifer] and Penicillins    Outpatient Medications:  Current Outpatient Medications   Medication Instructions    albuterol 90 mcg/actuation inhaler 2 puffs, inhalation, Every 6 hours PRN    atorvastatin (LIPITOR) 40 mg, oral, Nightly    carvedilol (COREG) 6.25 mg, oral, 2 times daily (morning and late afternoon)    cyanocobalamin (vitamin B-12) (VITAMIN B-12) 1,000 mcg, oral, Daily    Eliquis 5 mg, oral, 2 times daily    ergocalciferol (Vitamin D-2) 1.25 MG (46626 UT) capsule 1 capsule,  oral, Once Weekly    famotidine (PEPCID) 40 mg, oral, 2 times daily    fluticasone-umeclidin-vilanter (Trelegy Ellipta) 100-62.5-25 mcg blister with device 1 puff, inhalation, Daily RT    methIMAzole (TAPAZOLE) 5 mg, oral, Daily    montelukast (SINGULAIR) 10 mg, oral, Nightly    oxygen (O2) gas therapy 1 each, inhalation, Continuous, 5 Liters continuous    Mary Grace-Darryl Rx 1- mg-mg-mcg tablet 1 tablet, oral, Daily    sevelamer carbonate (RENVELA) 800 mg, oral, Daily    torsemide (DEMADEX) 20 mg, oral, 2 times daily    Wescaps 1 mg capsule 1 capsule, oral, Daily (0630)       Physical Exam:  Physical Exam  Vitals reviewed.   Constitutional:       Appearance: Normal appearance.   Neck:      Vascular: No carotid bruit or JVD.   Cardiovascular:      Rate and Rhythm: Normal rate and regular rhythm.      Heart sounds: Normal heart sounds, S1 normal and S2 normal. No murmur heard.  Pulmonary:      Effort: Pulmonary effort is normal.      Breath sounds: Normal breath sounds.   Abdominal:      General: Abdomen is flat. Bowel sounds are normal.      Palpations: Abdomen is soft.   Musculoskeletal:      Right lower leg: No edema.      Left lower leg: No edema.   Skin:     General: Skin is warm.   Neurological:      Mental Status: He is alert. Mental status is at baseline.   Psychiatric:         Mood and Affect: Mood normal.         Behavior: Behavior normal.           Last Labs:  CBC -  Lab Results   Component Value Date    WBC 7.3 08/12/2024    HGB 8.5 (L) 08/12/2024    HCT 27.6 (L) 08/12/2024    MCV 99 08/12/2024     08/12/2024       CMP -  Lab Results   Component Value Date    CALCIUM 8.9 08/12/2024    PHOS CANCELED 08/06/2023    PROT 6.1 (L) 08/12/2024    ALBUMIN 4.0 08/12/2024    AST 11 08/12/2024    ALT 11 08/12/2024    ALKPHOS 56 08/12/2024    BILITOT 0.3 08/12/2024       LIPID PANEL -   Lab Results   Component Value Date    CHOL 105 08/12/2024    TRIG 94 08/12/2024    HDL 40.1 08/12/2024    CHHDL 2.6 08/12/2024  "   LDLF 53 04/14/2023    VLDL 19 08/12/2024    NHDL 65 08/12/2024       RENAL FUNCTION PANEL -   Lab Results   Component Value Date    GLUCOSE 81 08/12/2024     08/12/2024    K 5.0 08/12/2024     08/12/2024    CO2 30 08/12/2024    ANIONGAP 15 08/12/2024    BUN 61 (H) 08/12/2024    CREATININE 7.43 (H) 08/12/2024    GFRMALE 9 (A) 09/21/2023    CALCIUM 8.9 08/12/2024    PHOS CANCELED 08/06/2023    ALBUMIN 4.0 08/12/2024        Lab Results   Component Value Date    BNP 1,040 (H) 04/21/2024    HGBA1C 5.1 08/12/2024       Last Cardiology Tests:  ECG:  ECG 12 Lead 05/13/2024      Echo:  Transthoracic Echo (TTE) Complete 04/22/2024      Ejection Fractions:  No results found for: \"EF\"    Cath:  Cardiac Catheterization Procedure 04/26/2024      Stress Test:  Nuclear Stress Test 04/24/2024      Cardiac Imaging:  No results found for this or any previous visit from the past 1095 days.          Assessment/Plan   In summary Mr. Ward is a 74-year-old gentleman with paroxysmal atrial flutter, atheromatous aortic disease and a history of TIA.     1-paroxysmal atrial flutter- recurrent initially in setting of acute illness. Then became persistent. He has needed cardioversion in the past(every 5-6 years). Status post ablation now. However, high risk of having paroxysmal atrial fibrillation and high stroke risk, recommend continue oral anticoagulants.        2-hypertension-blood pressure is well controlled. Home/self monitoring of BP encouraged. Goal BP discussed. Lifestyle advise given.      3-TIA-possibly related to disruption of oral anticoagulants which has been resumed now. He is also taking a statin now.      4- left carotid bruit- carotid duplex from Dec 2015, there is severe stenosis of left external carotid, which is likely cause of the bruit.      5- Pericardial effusion, suspect this was due to uremia. No recurrence.     6-congestive heart failure-due to inadequate dialysis currently dry weight has been " adjusted.    7-aortic atherosclerosis-he is on a statin.         Nathan Feng MD

## 2024-09-09 DIAGNOSIS — N18.6 END STAGE RENAL DISEASE (MULTI): ICD-10-CM

## 2024-09-09 DIAGNOSIS — E55.9 VITAMIN D DEFICIENCY: ICD-10-CM

## 2024-09-09 DIAGNOSIS — I70.0 ATHEROSCLEROSIS OF AORTA (CMS-HCC): ICD-10-CM

## 2024-09-09 DIAGNOSIS — D51.3 OTHER DIETARY VITAMIN B12 DEFICIENCY ANEMIA: ICD-10-CM

## 2024-09-09 DIAGNOSIS — E05.00 GOITER WITH HYPERTHYROIDISM: ICD-10-CM

## 2024-09-09 DIAGNOSIS — J44.9 COPD, SEVERE (MULTI): ICD-10-CM

## 2024-09-09 DIAGNOSIS — E78.5 DYSLIPIDEMIA, GOAL LDL BELOW 70: ICD-10-CM

## 2024-09-10 RX ORDER — FAMOTIDINE 40 MG/1
40 TABLET, FILM COATED ORAL 2 TIMES DAILY
Qty: 180 TABLET | Refills: 0 | Status: SHIPPED | OUTPATIENT
Start: 2024-09-10

## 2024-10-21 PROCEDURE — RXMED WILLOW AMBULATORY MEDICATION CHARGE

## 2024-10-22 ENCOUNTER — PHARMACY VISIT (OUTPATIENT)
Dept: PHARMACY | Facility: CLINIC | Age: 74
End: 2024-10-22
Payer: MEDICARE

## 2024-11-04 DIAGNOSIS — D51.3 OTHER DIETARY VITAMIN B12 DEFICIENCY ANEMIA: ICD-10-CM

## 2024-11-04 RX ORDER — CHOLECALCIFEROL (VITAMIN D3) 25 MCG
1 TABLET,CHEWABLE ORAL DAILY
Qty: 90 TABLET | Refills: 3 | Status: SHIPPED | OUTPATIENT
Start: 2024-11-04

## 2024-11-14 ENCOUNTER — APPOINTMENT (OUTPATIENT)
Dept: PRIMARY CARE | Facility: CLINIC | Age: 74
End: 2024-11-14
Payer: MEDICARE

## 2024-11-27 ENCOUNTER — TELEPHONE (OUTPATIENT)
Dept: PRIMARY CARE | Facility: CLINIC | Age: 74
End: 2024-11-27
Payer: MEDICARE

## 2024-11-27 DIAGNOSIS — D51.3 OTHER DIETARY VITAMIN B12 DEFICIENCY ANEMIA: ICD-10-CM

## 2024-11-27 DIAGNOSIS — J44.9 COPD, SEVERE (MULTI): ICD-10-CM

## 2024-11-27 DIAGNOSIS — E05.00 GOITER WITH HYPERTHYROIDISM: ICD-10-CM

## 2024-11-27 DIAGNOSIS — E78.5 DYSLIPIDEMIA, GOAL LDL BELOW 70: ICD-10-CM

## 2024-11-27 DIAGNOSIS — E55.9 VITAMIN D DEFICIENCY: ICD-10-CM

## 2024-11-27 DIAGNOSIS — I70.0 ATHEROSCLEROSIS OF AORTA (CMS-HCC): ICD-10-CM

## 2024-11-27 DIAGNOSIS — N18.6 END STAGE RENAL DISEASE (MULTI): ICD-10-CM

## 2024-11-27 RX ORDER — METHIMAZOLE 5 MG/1
5 TABLET ORAL DAILY
Qty: 90 TABLET | Refills: 3 | Status: SHIPPED | OUTPATIENT
Start: 2024-11-27

## 2024-11-27 NOTE — TELEPHONE ENCOUNTER
Med Refill   methIMAzole (Tapazole) 5 mg tablet [[id number]]      [id number] - [place], [place] - [address]  [address], [facility] [address] [address]  Phone: [phone]  Fax: [phone]  GERALDO #: --      Patient requesting a 90 day supply

## 2024-12-13 DIAGNOSIS — D51.3 OTHER DIETARY VITAMIN B12 DEFICIENCY ANEMIA: ICD-10-CM

## 2024-12-13 DIAGNOSIS — E05.00 GOITER WITH HYPERTHYROIDISM: ICD-10-CM

## 2024-12-13 DIAGNOSIS — N18.6 END STAGE RENAL DISEASE (MULTI): ICD-10-CM

## 2024-12-13 DIAGNOSIS — E55.9 VITAMIN D DEFICIENCY: ICD-10-CM

## 2024-12-13 DIAGNOSIS — I70.0 ATHEROSCLEROSIS OF AORTA (CMS-HCC): ICD-10-CM

## 2024-12-13 DIAGNOSIS — E78.5 DYSLIPIDEMIA, GOAL LDL BELOW 70: ICD-10-CM

## 2024-12-13 DIAGNOSIS — J44.9 COPD, SEVERE (MULTI): ICD-10-CM

## 2024-12-13 RX ORDER — FAMOTIDINE 40 MG/1
40 TABLET, FILM COATED ORAL 2 TIMES DAILY
Qty: 180 TABLET | Refills: 0 | Status: SHIPPED | OUTPATIENT
Start: 2024-12-13

## 2024-12-26 DIAGNOSIS — E78.5 DYSLIPIDEMIA, GOAL LDL BELOW 70: ICD-10-CM

## 2024-12-26 RX ORDER — ATORVASTATIN CALCIUM 40 MG/1
40 TABLET, FILM COATED ORAL NIGHTLY
Qty: 90 TABLET | Refills: 3 | Status: SHIPPED | OUTPATIENT
Start: 2024-12-26

## 2025-01-19 PROCEDURE — RXMED WILLOW AMBULATORY MEDICATION CHARGE

## 2025-01-21 ENCOUNTER — PHARMACY VISIT (OUTPATIENT)
Dept: PHARMACY | Facility: CLINIC | Age: 75
End: 2025-01-21
Payer: MEDICARE

## 2025-01-22 ENCOUNTER — APPOINTMENT (OUTPATIENT)
Dept: PRIMARY CARE | Facility: CLINIC | Age: 75
End: 2025-01-22
Payer: MEDICARE

## 2025-01-22 VITALS
SYSTOLIC BLOOD PRESSURE: 122 MMHG | HEART RATE: 68 BPM | DIASTOLIC BLOOD PRESSURE: 80 MMHG | HEIGHT: 74 IN | BODY MASS INDEX: 22.33 KG/M2 | WEIGHT: 174 LBS

## 2025-01-22 DIAGNOSIS — Z00.00 MEDICARE ANNUAL WELLNESS VISIT, SUBSEQUENT: Primary | ICD-10-CM

## 2025-01-22 DIAGNOSIS — N18.5 ANEMIA OF CHRONIC RENAL FAILURE, STAGE 5 (MULTI): ICD-10-CM

## 2025-01-22 DIAGNOSIS — I48.92 ATRIAL FIBRILLATION AND FLUTTER: ICD-10-CM

## 2025-01-22 DIAGNOSIS — Z79.01 CHRONIC ANTICOAGULATION: ICD-10-CM

## 2025-01-22 DIAGNOSIS — I50.32 HYPERTENSIVE HEART DISEASE WITH CHRONIC DIASTOLIC CONGESTIVE HEART FAILURE: ICD-10-CM

## 2025-01-22 DIAGNOSIS — J30.2 PERENNIAL ALLERGIC RHINITIS WITH SEASONAL VARIATION: ICD-10-CM

## 2025-01-22 DIAGNOSIS — Z99.2 ESRD (END STAGE RENAL DISEASE) ON DIALYSIS (MULTI): ICD-10-CM

## 2025-01-22 DIAGNOSIS — J44.9 COPD, SEVERE (MULTI): ICD-10-CM

## 2025-01-22 DIAGNOSIS — I11.0 HYPERTENSIVE HEART DISEASE WITH CHRONIC DIASTOLIC CONGESTIVE HEART FAILURE: ICD-10-CM

## 2025-01-22 DIAGNOSIS — I70.0 ATHEROSCLEROSIS OF AORTA (CMS-HCC): ICD-10-CM

## 2025-01-22 DIAGNOSIS — N25.81 SECONDARY HYPERPARATHYROIDISM OF RENAL ORIGIN (MULTI): ICD-10-CM

## 2025-01-22 DIAGNOSIS — I48.91 ATRIAL FIBRILLATION AND FLUTTER: ICD-10-CM

## 2025-01-22 DIAGNOSIS — E78.5 DYSLIPIDEMIA, GOAL LDL BELOW 70: ICD-10-CM

## 2025-01-22 DIAGNOSIS — N18.6 ESRD (END STAGE RENAL DISEASE) ON DIALYSIS (MULTI): ICD-10-CM

## 2025-01-22 DIAGNOSIS — D63.1 ANEMIA OF CHRONIC RENAL FAILURE, STAGE 5 (MULTI): ICD-10-CM

## 2025-01-22 DIAGNOSIS — J30.89 PERENNIAL ALLERGIC RHINITIS WITH SEASONAL VARIATION: ICD-10-CM

## 2025-01-22 PROBLEM — R73.02 IGT (IMPAIRED GLUCOSE TOLERANCE): Status: RESOLVED | Noted: 2024-05-15 | Resolved: 2025-01-22

## 2025-01-22 PROBLEM — J96.11 CHRONIC RESPIRATORY FAILURE WITH HYPOXIA (MULTI): Status: RESOLVED | Noted: 2023-01-20 | Resolved: 2025-01-22

## 2025-01-22 PROBLEM — I27.81 COR PULMONALE (CHRONIC): Status: RESOLVED | Noted: 2024-03-23 | Resolved: 2025-01-22

## 2025-01-22 PROBLEM — I10 HYPERTENSION: Status: RESOLVED | Noted: 2018-11-19 | Resolved: 2025-01-22

## 2025-01-22 PROBLEM — I48.0 PAROXYSMAL A-FIB (MULTI): Status: RESOLVED | Noted: 2024-04-21 | Resolved: 2025-01-22

## 2025-01-22 PROCEDURE — 1159F MED LIST DOCD IN RCRD: CPT | Performed by: INTERNAL MEDICINE

## 2025-01-22 PROCEDURE — 3074F SYST BP LT 130 MM HG: CPT | Performed by: INTERNAL MEDICINE

## 2025-01-22 PROCEDURE — 3079F DIAST BP 80-89 MM HG: CPT | Performed by: INTERNAL MEDICINE

## 2025-01-22 PROCEDURE — 1170F FXNL STATUS ASSESSED: CPT | Performed by: INTERNAL MEDICINE

## 2025-01-22 PROCEDURE — 99214 OFFICE O/P EST MOD 30 MIN: CPT | Performed by: INTERNAL MEDICINE

## 2025-01-22 PROCEDURE — 1158F ADVNC CARE PLAN TLK DOCD: CPT | Performed by: INTERNAL MEDICINE

## 2025-01-22 PROCEDURE — 1036F TOBACCO NON-USER: CPT | Performed by: INTERNAL MEDICINE

## 2025-01-22 PROCEDURE — 1123F ACP DISCUSS/DSCN MKR DOCD: CPT | Performed by: INTERNAL MEDICINE

## 2025-01-22 PROCEDURE — 1160F RVW MEDS BY RX/DR IN RCRD: CPT | Performed by: INTERNAL MEDICINE

## 2025-01-22 RX ORDER — MONTELUKAST SODIUM 10 MG/1
10 TABLET ORAL NIGHTLY
Qty: 90 TABLET | Refills: 3 | Status: SHIPPED | OUTPATIENT
Start: 2025-01-22 | End: 2026-01-22

## 2025-01-22 ASSESSMENT — ACTIVITIES OF DAILY LIVING (ADL)
DRESSING: INDEPENDENT
MANAGING_FINANCES: INDEPENDENT
TAKING_MEDICATION: INDEPENDENT
BATHING: INDEPENDENT
GROCERY_SHOPPING: INDEPENDENT
DOING_HOUSEWORK: INDEPENDENT

## 2025-01-22 ASSESSMENT — PATIENT HEALTH QUESTIONNAIRE - PHQ9
2. FEELING DOWN, DEPRESSED OR HOPELESS: NOT AT ALL
1. LITTLE INTEREST OR PLEASURE IN DOING THINGS: NOT AT ALL
SUM OF ALL RESPONSES TO PHQ9 QUESTIONS 1 AND 2: 0

## 2025-01-22 ASSESSMENT — ANXIETY QUESTIONNAIRES
5. BEING SO RESTLESS THAT IT IS HARD TO SIT STILL: NOT AT ALL
3. WORRYING TOO MUCH ABOUT DIFFERENT THINGS: NOT AT ALL
7. FEELING AFRAID AS IF SOMETHING AWFUL MIGHT HAPPEN: NOT AT ALL
GAD7 TOTAL SCORE: 0
2. NOT BEING ABLE TO STOP OR CONTROL WORRYING: NOT AT ALL
1. FEELING NERVOUS, ANXIOUS, OR ON EDGE: NOT AT ALL
6. BECOMING EASILY ANNOYED OR IRRITABLE: NOT AT ALL
IF YOU CHECKED OFF ANY PROBLEMS ON THIS QUESTIONNAIRE, HOW DIFFICULT HAVE THESE PROBLEMS MADE IT FOR YOU TO DO YOUR WORK, TAKE CARE OF THINGS AT HOME, OR GET ALONG WITH OTHER PEOPLE: NOT DIFFICULT AT ALL
4. TROUBLE RELAXING: NOT AT ALL

## 2025-01-22 ASSESSMENT — ENCOUNTER SYMPTOMS
LOSS OF SENSATION IN FEET: 0
DEPRESSION: 0
OCCASIONAL FEELINGS OF UNSTEADINESS: 0

## 2025-01-22 NOTE — ASSESSMENT & PLAN NOTE
Refilled montelukast stable at this time  Orders:    montelukast (Singulair) 10 mg tablet; Take 1 tablet (10 mg) by mouth once daily at bedtime.    Follow Up In [facility] - PCP - Established; Future

## 2025-01-22 NOTE — PROGRESS NOTES
"Subjective   Reason for Visit: Renard Ward is an 74 y.o. male here for a Medicare Wellness visit.     Past Medical, Surgical, and Family History reviewed and updated in chart.    Reviewed all medications by prescribing practitioner or clinical pharmacist (such as prescriptions, OTCs, herbal therapies and supplements) and documented in the medical record.    HPI    Patient Care Team:  Leandro Bassett, DO as PCP - General     Review of Systems   All other systems reviewed and are negative.      Objective   Vitals:  /80   Pulse 68   Ht 1.88 m (6' 2\")   Wt 78.9 kg (174 lb)   BMI 22.34 kg/m²       Physical Exam  Vitals and nursing note reviewed.   Constitutional:       General: He is not in acute distress.     Appearance: Normal appearance. He is well-developed. He is not toxic-appearing.   HENT:      Head: Normocephalic and atraumatic.      Right Ear: Tympanic membrane and external ear normal.      Left Ear: Tympanic membrane and external ear normal.      Nose: Nose normal.      Mouth/Throat:      Mouth: Mucous membranes are moist.      Pharynx: Oropharynx is clear. No oropharyngeal exudate or posterior oropharyngeal erythema.      Tonsils: No tonsillar exudate. 2+ on the right. 2+ on the left.   Eyes:      Extraocular Movements: Extraocular movements intact.      Conjunctiva/sclera: Conjunctivae normal.   Cardiovascular:      Rate and Rhythm: Normal rate and regular rhythm.      Pulses: Normal pulses.      Heart sounds: Normal heart sounds. No murmur heard.  Pulmonary:      Effort: Pulmonary effort is normal.      Breath sounds: Normal breath sounds.   Abdominal:      General: Abdomen is flat. Bowel sounds are normal.      Palpations: Abdomen is soft.   Musculoskeletal:      Cervical back: Neck supple.   Feet:      Right foot:      Skin integrity: Skin integrity normal. No ulcer, blister, skin breakdown, erythema, warmth or callus.      Toenail Condition: Right toenails are normal.      Left foot:     "  Skin integrity: Skin integrity normal. No ulcer, blister, skin breakdown, erythema, warmth or callus.      Toenail Condition: Left toenails are normal.   Lymphadenopathy:      Cervical: No cervical adenopathy.   Skin:     General: Skin is warm and dry.      Capillary Refill: Capillary refill takes more than 3 seconds.      Findings: No rash.   Neurological:      Mental Status: He is alert. Mental status is at baseline.      Sensory: Sensation is intact.   Psychiatric:         Mood and Affect: Mood normal.         Behavior: Behavior normal.         Thought Content: Thought content normal.         Judgment: Judgment normal.         Assessment & Plan  COPD, severe (Multi)  Patient off chronic oxygen uses only at night to assist with sleep and hypoxia at night continue  Orders:    Follow Up In [facility] - PCP - Established    Perennial allergic rhinitis with seasonal variation  Refilled montelukast stable at this time  Orders:    montelukast (Singulair) 10 mg tablet; Take 1 tablet (10 mg) by mouth once daily at bedtime.    Follow Up In [facility] - PCP - Established; Future    Secondary hyperparathyroidism of renal origin (Multi)  Continue monitoring treatment with nephrologist on Renvela       ESRD (end stage renal disease) on dialysis (Multi)  Stable at this time with hemodialysis at home nightly basis       Medicare annual wellness visit, subsequent  Discussed advanced medical directives with wife as medical power of  unchanged.  Up-to-date with vaccinations and screenings reevaluate 6 months       Anemia of chronic renal failure, stage 5 (Multi)  Stable at this time continue to follow with nephrologist care       Dyslipidemia, goal LDL below 70  Continue atorvastatin 40 mg nightly       Atherosclerosis of aorta (CMS-HCC)  Reevaluate with echocardiogram followed by cardiologist with optimal blood pressure control on carvedilol 6.25 mg twice a day       Atrial fibrillation and  flutter  Stable rate and rhythm control on carvedilol 6.25 mg twice a day with apixaban 5 mg twice a day for stroke prevention       Chronic anticoagulation  Continue apixaban 5 mg twice a day       Hypertensive heart disease with chronic diastolic congestive heart failure  Well compensated at this time on carvedilol and torsemide 20 mg twice a day

## 2025-01-22 NOTE — ASSESSMENT & PLAN NOTE
Patient off chronic oxygen uses only at night to assist with sleep and hypoxia at night continue  Orders:    Follow Up In [facility] - PCP - Established

## 2025-01-22 NOTE — ASSESSMENT & PLAN NOTE
Discussed advanced medical directives with wife as medical power of  unchanged.  Up-to-date with vaccinations and screenings reevaluate 6 months

## 2025-01-22 NOTE — ASSESSMENT & PLAN NOTE
Reevaluate with echocardiogram followed by cardiologist with optimal blood pressure control on carvedilol 6.25 mg twice a day

## 2025-01-22 NOTE — PROGRESS NOTES
"Subjective   Reason for Visit: Renard Ward is an 74 y.o. male here for a Medicare Wellness visit.          Reviewed all medications by prescribing practitioner or clinical pharmacist (such as prescriptions, OTCs, herbal therapies and supplements) and documented in the medical record.    HPI    Patient Care Team:  Leandro Bassett DO as PCP - General     Review of Systems    Objective   Vitals:  /80   Pulse 68   Ht 1.88 m (6' 2\")   Wt 78.9 kg (174 lb)   BMI 22.34 kg/m²       Physical Exam    Assessment & Plan  COPD, severe (Multi)    Orders:    Follow Up In [facility] - PCP - Established    Perennial allergic rhinitis with seasonal variation                   "

## 2025-01-23 NOTE — ASSESSMENT & PLAN NOTE
Stable rate and rhythm control on carvedilol 6.25 mg twice a day with apixaban 5 mg twice a day for stroke prevention

## 2025-02-19 ASSESSMENT — ENCOUNTER SYMPTOMS
DEPRESSION: 0
GASTROINTESTINAL NEGATIVE: 1
CONSTITUTIONAL NEGATIVE: 1
SHORTNESS OF BREATH: 0
SYNCOPE: 0
HOARSE VOICE: 1
PALPITATIONS: 0
DYSPNEA ON EXERTION: 1
FALLS: 0
FOCAL WEAKNESS: 0
ORTHOPNEA: 0
LIGHT-HEADEDNESS: 0
MEMORY LOSS: 0
BLURRED VISION: 0
COUGH: 0
RESPIRATORY NEGATIVE: 1
IRREGULAR HEARTBEAT: 0
DECREASED APPETITE: 0

## 2025-02-19 NOTE — PROGRESS NOTES
Chief Complaint/Reason for Visit:   Patient is coming in today as a 6 month Cardiovascular follow up.     History Of Present Illness:    Mr. Ward is coming in today as a 6-month cardiovascular follow-up.  We have followed this patient previously for hypertension, tobacco use disorder, atrial flutter, prior TIA, CAD and atheromatous aortic disease.  Heart catheterization in April, 2024 showed mild, nonobstructive coronary artery disease.    Patient comes in today feeling well.  He has some mild dyspnea today as he was unable to do his home hemodialysis yesterday due to an equipment malfunction.  He will have a dialysis treatment later today.  Patient is currently wearing oxygen which she normally does not require.  He denies any chest pain, pressure, palpitations, orthopnea, or edema.  He is taking his medications as prescribed.    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[facility]) (06/16/2020), 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), Nicotine dependence, unspecified, uncomplicated (01/14/2020), Nontoxic multinodular goiter (11/19/2020), Obstructive sleep apnea (adult) (pediatric) (03/11/2020), Other allergic rhinitis (02/23/2021), Other fecal abnormalities (07/30/2021), Other hypersomnia (01/23/2020), Other hypersomnia (01/23/2020), Other ill-defined heart diseases (04/27/2020), Other pericardial effusion (noninflammatory) ([facility]) (06/18/2020), Other  specified personal risk factors, not elsewhere classified (07/30/2020), Other specified symptoms and signs involving the circulatory and respiratory systems (10/10/2019), Personal history of diseases of the skin and subcutaneous tissue (11/19/2020), Personal history of nicotine dependence (08/18/2021), Personal history of other diseases of the digestive system, Personal history of other diseases of the nervous system and sense organs, Personal history of other endocrine, nutritional and metabolic disease (11/19/2020), Personal history of other endocrine, nutritional and metabolic disease (10/14/2020), Personal history of other endocrine, nutritional and metabolic disease (08/20/2020), Personal history of other infectious and parasitic diseases (07/06/2021), Personal history of other infectious and parasitic diseases (02/04/2021), Personal history of other specified conditions (04/12/2016), Personal history of other specified conditions (04/07/2016), Personal history of transient ischemic attack (TIA), and cerebral infarction without residual deficits, Pleural effusion, not elsewhere classified (07/30/2020), Pleural effusion, not elsewhere classified (03/11/2020), Pneumonia due to Streptococcus pneumoniae (CMS-HCC) (03/11/2020), Post-traumatic stress disorder, unspecified, Rash and other nonspecific skin eruption (03/25/2021), Secondary polycythemia (01/23/2020), Tinea unguium (08/25/2020), and Urinary catheter complication (CMS-HCC) (06/20/2023).    Past Surgical History:  He has a past surgical history that includes Appendectomy (04/07/2016); IR CVC tunneled (6/28/2023); and Cardiac catheterization (N/A, 4/26/2024).      Social History:  He reports that he has quit smoking. His smoking use included cigarettes. He has never used smokeless tobacco. He reports that he does not currently use alcohol. He reports that he does not use drugs.    Family History:  Family History   Family history unknown: Yes       "  Allergies:  Epogen [epoetin jennifer] and Penicillins    Medications:  Current Outpatient Medications   Medication Instructions    albuterol 90 mcg/actuation inhaler 2 puffs, inhalation, Every 6 hours PRN    atorvastatin (LIPITOR) 40 mg, oral, Nightly    carvedilol (COREG) 6.25 mg, oral, 2 times daily (morning and late afternoon)    Eliquis 5 mg, oral, 2 times daily    ergocalciferol (Vitamin D-2) 1.25 MG (34095 UT) capsule 1 capsule, Once Weekly    famotidine (PEPCID) 40 mg, oral, 2 times daily    fluticasone-umeclidin-vilanter (Trelegy Ellipta) 100-62.5-25 mcg blister with device 1 puff, inhalation, Daily RT    methIMAzole (TAPAZOLE) 5 mg, oral, Daily    montelukast (SINGULAIR) 10 mg, oral, Nightly    oxygen (O2) gas therapy 1 each, Continuous    Mary Grace-Darryl Rx 1- mg-mg-mcg tablet 1 tablet, Daily    sevelamer carbonate (RENVELA) 800 mg, Daily    torsemide (DEMADEX) 20 mg, 2 times daily    Vitamin B-12 1,000 mcg, oral, Daily    Wescaps 1 mg capsule 1 capsule, Daily (0630)       Review of Systems:  Review of Systems   Constitutional: Negative. Negative for decreased appetite and malaise/fatigue.   HENT:  Positive for hoarse voice.    Eyes:  Negative for blurred vision and visual disturbance.   Cardiovascular:  Positive for dyspnea on exertion. Negative for chest pain, irregular heartbeat, leg swelling, orthopnea, palpitations and syncope.   Respiratory: Negative.  Negative for cough and shortness of breath.         Home O2 at hs and prn in the day   Musculoskeletal:  Negative for arthritis and falls.   Gastrointestinal: Negative.    Neurological:  Negative for focal weakness and light-headedness.   Psychiatric/Behavioral:  Negative for depression and memory loss.         Vitals  Visit Vitals  /60 (BP Location: Right arm, Patient Position: Sitting, BP Cuff Size: Adult)   Pulse 82   Ht 1.88 m (6' 2\")   Wt 88 kg (194 lb)   SpO2 97%   BMI 24.91 kg/m²   Smoking Status Former   BSA 2.14 m²        Physical " Exam:  Physical Exam  Constitutional:       Appearance: Normal appearance.   HENT:      Head: Normocephalic.   Eyes:      Conjunctiva/sclera: Conjunctivae normal.   Neck:      Comments: + L bruit.  Dialysis cath [facility] area  Cardiovascular:      Rate and Rhythm: Normal rate and regular rhythm.      Pulses: Normal pulses.      Heart sounds: S1 normal and S2 normal. No murmur heard.     No friction rub. No gallop.   Pulmonary:      Effort: Pulmonary effort is normal.      Breath sounds: Normal breath sounds.      Comments: Wearing O2  Abdominal:      General: Bowel sounds are normal.      Palpations: Abdomen is soft.      Tenderness: There is no abdominal tenderness.   Musculoskeletal:      Cervical back: Neck supple.      Right lower leg: No edema.      Left lower leg: No edema.   Skin:     General: Skin is warm and dry.   Neurological:      General: No focal deficit present.      Mental Status: He is alert and oriented to person, place, and time.   Psychiatric:         Attention and Perception: Attention normal.         Mood and Affect: Mood normal.           Last Labs:  CBC -  Lab Results   Component Value Date    WBC 7.3 08/12/2024    HGB 8.5 (L) 08/12/2024    HCT 27.6 (L) 08/12/2024    MCV 99 08/12/2024     08/12/2024     Lab Results   Component Value Date    GLUCOSE 81 08/12/2024    CALCIUM 8.9 08/12/2024     08/12/2024    K 5.0 08/12/2024    CO2 30 08/12/2024     08/12/2024    BUN 61 (H) 08/12/2024    CREATININE 7.43 (H) 08/12/2024      CMP -  Lab Results   Component Value Date    CALCIUM 8.9 08/12/2024    PHOS CANCELED 08/06/2023    PROT 6.1 (L) 08/12/2024    ALBUMIN 4.0 08/12/2024    AST 11 08/12/2024    ALT 11 08/12/2024    ALKPHOS 56 08/12/2024    BILITOT 0.3 08/12/2024       LIPID PANEL -   Lab Results   Component Value Date    CHOL 105 08/12/2024    TRIG 94 08/12/2024    HDL 40.1 08/12/2024    CHHDL 2.6 08/12/2024    LDLF 53 04/14/2023    VLDL 19 08/12/2024    NHDL 65 08/12/2024       Lab  Results   Component Value Date    BNP 1,040 (H) 04/21/2024    HGBA1C 5.1 08/12/2024       Last Cardiology Tests:    Echo:4-22-24  CONCLUSIONS:   1. Left ventricular systolic function is normal with a 60-65% estimated ejection fraction.   2. There is a moderate pleural effusion.   3. Mild mitral valve regurgitation.   4. Mild tricuspid regurgitation is visualized.   5. Mildly elevated RVSP    Cath:4-26-24  Recommendations:  Maximize medical therapy.  Agressive risk factor modification efforts.  Telemetry monitoring.  Follow-up with cardiology clinic.  Monitor vitals and arterial access site/pulses.  Lipid lowering agent or Statin therapy.  Medical management of coronary artery disease.  Aspirin therapy.     ____________________________________________________________________________________  CONCLUSIONS:   1. Mild nonobstructive CAD.   2. No significant LV-AO peak to peak pullback gradient.   3. Left Ventricular end-diastolic pressure = 14.        Lab review: I have personally reviewed the laboratory result(s)     Assessment/Plan:  Coronary artery disease: Patient has a history of mild, nonobstructive coronary artery disease noted on heart catheterization in April, 2024.  Medical therapy is recommended.  Patient will continue on atorvastatin and carvedilol.  He is not on aspirin due to the need for anticoagulation.  Patient is angina class 0.    Paroxysmal atrial flutter: Patient has had 2 prior ablations.  He is regular today on exam.  He continues on carvedilol 6.25 twice daily.  Patient has an elevated [id number] score requiring anticoagulation.  He is not having any abnormal bleeding or bruising and should continue on Eliquis 5 mg twice daily.    Hypertension: Patient's blood pressure is well-controlled.  He will continue on carvedilol 6.25 twice daily and torsemide 20 mg twice daily.  He was encouraged to be on a low-sodium diet.    Dyslipidemia: Lipid labs from August, 2024 shows an LDL of 46, triglycerides  94.  Patient will continue on atorvastatin 40 mg nightly.    Patient has a follow-up scheduled with his PCP this summer and will get labs prior to that visit.  He is scheduled to see Dr. Feng in August.  Patient instructed to call with any cardiovascular complaints. All questions were answered.       Dragon dictation was utilized to create this document. Quite often unanticipated grammatical, syntax,  and other interpretive errors are inadvertently transcribed by the computer software.  Please disregard these errors.  Please excuse any errors that have escaped final proofreading.           Saritha Araujo, APRN-CNP   Declines

## 2025-02-25 ENCOUNTER — APPOINTMENT (OUTPATIENT)
Dept: CARDIOLOGY | Facility: HOSPITAL | Age: 75
End: 2025-02-25
Payer: MEDICARE

## 2025-03-11 ENCOUNTER — OFFICE VISIT (OUTPATIENT)
Dept: CARDIOLOGY | Facility: HOSPITAL | Age: 75
End: 2025-03-11
Payer: MEDICARE

## 2025-03-11 VITALS
DIASTOLIC BLOOD PRESSURE: 60 MMHG | BODY MASS INDEX: 24.9 KG/M2 | WEIGHT: 194 LBS | SYSTOLIC BLOOD PRESSURE: 120 MMHG | HEIGHT: 74 IN | HEART RATE: 82 BPM | OXYGEN SATURATION: 97 %

## 2025-03-11 DIAGNOSIS — I65.22 STENOSIS OF LEFT EXTERNAL CAROTID ARTERY: ICD-10-CM

## 2025-03-11 DIAGNOSIS — E78.5 DYSLIPIDEMIA, GOAL LDL BELOW 70: ICD-10-CM

## 2025-03-11 DIAGNOSIS — I25.10 CORONARY ARTERY DISEASE INVOLVING NATIVE CORONARY ARTERY OF NATIVE HEART WITHOUT ANGINA PECTORIS: Primary | ICD-10-CM

## 2025-03-11 DIAGNOSIS — I10 PRIMARY HYPERTENSION: ICD-10-CM

## 2025-03-11 DIAGNOSIS — I48.92 PAROXYSMAL ATRIAL FLUTTER (MULTI): ICD-10-CM

## 2025-03-11 DIAGNOSIS — I27.81 COR PULMONALE (CHRONIC): ICD-10-CM

## 2025-03-11 DIAGNOSIS — I70.0 ATHEROSCLEROSIS OF AORTA (CMS-HCC): ICD-10-CM

## 2025-03-11 DIAGNOSIS — I48.0 PAROXYSMAL A-FIB (MULTI): ICD-10-CM

## 2025-03-11 DIAGNOSIS — E78.5 HYPERLIPIDEMIA, UNSPECIFIED HYPERLIPIDEMIA TYPE: ICD-10-CM

## 2025-03-11 PROCEDURE — 3078F DIAST BP <80 MM HG: CPT | Performed by: NURSE PRACTITIONER

## 2025-03-11 PROCEDURE — 1159F MED LIST DOCD IN RCRD: CPT | Performed by: NURSE PRACTITIONER

## 2025-03-11 PROCEDURE — 1036F TOBACCO NON-USER: CPT | Performed by: NURSE PRACTITIONER

## 2025-03-11 PROCEDURE — 99213 OFFICE O/P EST LOW 20 MIN: CPT | Performed by: NURSE PRACTITIONER

## 2025-03-11 PROCEDURE — 3074F SYST BP LT 130 MM HG: CPT | Performed by: NURSE PRACTITIONER

## 2025-03-11 PROCEDURE — 1160F RVW MEDS BY RX/DR IN RCRD: CPT | Performed by: NURSE PRACTITIONER

## 2025-03-11 NOTE — PATIENT INSTRUCTIONS
Continue on current meds  Heart healthy, low sodium diet  Mediterranean diet is recommended  Labs due this summer for you  Follow up with Dr Feng in AUG

## 2025-03-13 DIAGNOSIS — N18.6 END STAGE RENAL DISEASE (MULTI): ICD-10-CM

## 2025-03-13 DIAGNOSIS — J44.9 COPD, SEVERE (MULTI): ICD-10-CM

## 2025-03-13 DIAGNOSIS — E05.00 GOITER WITH HYPERTHYROIDISM: ICD-10-CM

## 2025-03-13 DIAGNOSIS — E78.5 DYSLIPIDEMIA, GOAL LDL BELOW 70: ICD-10-CM

## 2025-03-13 DIAGNOSIS — E55.9 VITAMIN D DEFICIENCY: ICD-10-CM

## 2025-03-13 DIAGNOSIS — D51.3 OTHER DIETARY VITAMIN B12 DEFICIENCY ANEMIA: ICD-10-CM

## 2025-03-13 DIAGNOSIS — I70.0 ATHEROSCLEROSIS OF AORTA (CMS-HCC): ICD-10-CM

## 2025-03-13 NOTE — TELEPHONE ENCOUNTER
Patient called to request medication refill.    Last appointment with our providers: 01/22/2025  Next appointment with our providers: 07/23/2025  Name of Medication:  famotidine (Pepcid) 40 mg tablet     Pharmacy: [facility] [place]

## 2025-03-14 RX ORDER — FAMOTIDINE 40 MG/1
40 TABLET, FILM COATED ORAL 2 TIMES DAILY
Qty: 180 TABLET | Refills: 0 | Status: SHIPPED | OUTPATIENT
Start: 2025-03-14

## 2025-03-21 DIAGNOSIS — I48.92 ATRIAL FLUTTER, PAROXYSMAL (MULTI): ICD-10-CM

## 2025-03-21 DIAGNOSIS — J44.9 COPD, SEVERE (MULTI): Primary | ICD-10-CM

## 2025-03-21 DIAGNOSIS — I50.9 CONGESTIVE HEART FAILURE, UNSPECIFIED HF CHRONICITY, UNSPECIFIED HEART FAILURE TYPE: ICD-10-CM

## 2025-04-16 ENCOUNTER — APPOINTMENT (OUTPATIENT)
Dept: PHARMACY | Facility: HOSPITAL | Age: 75
End: 2025-04-16
Payer: MEDICARE

## 2025-04-16 DIAGNOSIS — I50.9 CONGESTIVE HEART FAILURE, UNSPECIFIED HF CHRONICITY, UNSPECIFIED HEART FAILURE TYPE: ICD-10-CM

## 2025-04-16 DIAGNOSIS — J43.2 CENTRILOBULAR EMPHYSEMA (MULTI): ICD-10-CM

## 2025-04-16 DIAGNOSIS — I48.92 ATRIAL FLUTTER, PAROXYSMAL (MULTI): ICD-10-CM

## 2025-04-16 DIAGNOSIS — J44.9 COPD, SEVERE (MULTI): ICD-10-CM

## 2025-04-16 PROCEDURE — RXMED WILLOW AMBULATORY MEDICATION CHARGE

## 2025-04-16 RX ORDER — FLUTICASONE FUROATE, UMECLIDINIUM BROMIDE AND VILANTEROL TRIFENATATE 100; 62.5; 25 UG/1; UG/1; UG/1
1 POWDER RESPIRATORY (INHALATION)
Qty: 180 EACH | Refills: 3 | Status: SHIPPED | OUTPATIENT
Start: 2025-04-16

## 2025-04-16 NOTE — PROGRESS NOTES
Clinical Pharmacy Visit    Renard Ward is a 74 y.o. male was referred to Clinical Pharmacy Team to complete a post-discharge medication optimization and monitoring visit. The patient was referred for their Afib, CHF and COPD management. This visit was also to discuss renewal of the Patient Assistance Program.       Referring Provider: Rodri Martinez MD  PCP: Leandro Bassett,  - last visit: 1/22      Subjective   Allergies[1]    [id number] - [place], [place] - [address]  [address]  [facility] [address]  Phone: [phone] Fax: [phone]    [facility]  [address], [address]  [facility] [address]  Phone: [phone] Fax: [phone]      Medication System Management:  Affordability/Accessibility: approved for PAP  Adherence/Organization: no issues       Social History     Social History Narrative    Not on file          HPI      Review of Systems        Objective     There were no vitals taken for this visit.   BP Readings from Last 4 Encounters:   03/11/25 120/60   01/22/25 122/80   08/22/24 132/54   08/14/24 138/60      There were no vitals filed for this visit.     LAB  Lab Results   Component Value Date    BILITOT 0.3 08/12/2024    CALCIUM 8.9 08/12/2024    CO2 30 08/12/2024     08/12/2024    CREATININE 7.43 (H) 08/12/2024    GLUCOSE 81 08/12/2024    ALKPHOS 56 08/12/2024    K 5.0 08/12/2024    PROT 6.1 (L) 08/12/2024     08/12/2024    AST 11 08/12/2024    ALT 11 08/12/2024    BUN 61 (H) 08/12/2024    ANIONGAP 15 08/12/2024    MG 1.86 04/26/2024    PHOS CANCELED 08/06/2023    LDH 99 03/17/2020    ALBUMIN 4.0 08/12/2024    LIPASE 46 04/21/2024    GFRF CANCELED 08/06/2023    GFRMALE 9 (A) 09/21/2023     Lab Results   Component Value Date    TRIG 94 08/12/2024    CHOL 105 08/12/2024    LDLCALC 46 08/12/2024    HDL 40.1 08/12/2024     Lab Results   Component Value Date    HGBA1C 5.1 08/12/2024         Current Outpatient Medications   Medication  Instructions    albuterol 90 mcg/actuation inhaler 2 puffs, inhalation, Every 6 hours PRN    atorvastatin (LIPITOR) 40 mg, oral, Nightly    carvedilol (COREG) 6.25 mg, oral, 2 times daily (morning and late afternoon)    Eliquis 5 mg, oral, 2 times daily    ergocalciferol (Vitamin D-2) 1.25 MG (76934 UT) capsule 1 capsule, Once Weekly    famotidine (PEPCID) 40 mg, oral, 2 times daily    fluticasone-umeclidin-vilanter (Trelegy Ellipta) 100-62.5-25 mcg blister with device 1 puff, inhalation, Daily RT    methIMAzole (TAPAZOLE) 5 mg, oral, Daily    montelukast (SINGULAIR) 10 mg, oral, Nightly    oxygen (O2) gas therapy 1 each, Continuous    Mary Grace-Darryl Rx 1- mg-mg-mcg tablet 1 tablet, Daily    sevelamer carbonate (RENVELA) 800 mg, Daily    torsemide (DEMADEX) 20 mg, 2 times daily    Vitamin B-12 1,000 mcg, oral, Daily    Wescaps 1 mg capsule 1 capsule, Daily (0630)            Assessment/Plan   Problem List Items Addressed This Visit       COPD, severe (Multi)     Other Visit Diagnoses         Atrial flutter, paroxysmal (Multi)          Congestive heart failure, unspecified HF chronicity, unspecified heart failure type              Afib  No chest pain or SOB  No feelings of palpitations  Continue with coreg and eliquis twice daily  Although Scr >1.5, both age and weight are appropriate for 5mg twice daily dosing  COPD  Breathing has been good lately  No complaints of SOB  Still continuing to wear 3.5 L  Has his own pulse ox at home to monitor saturations when needed   Continue trelegy daily for maintenance inhaler (triple therapy)  Rinse mouth out after each use   Albuterol as needed   CHF  Most recent EF was 60-65% from 4/22/24  Current GDMT --> carvedilol   Not able to start on others due to kidney function  Does home dialysis 4 days a week   Continue torsemide   Also taking renal cap daily   Continue daily weights and BP's and keep log for all future visits   BP's due tend to run low while doing his dialysis  sessions     PAP:  -he is approved until 4/30/2025  -confirmed today that he did still have to file taxes in 2024 due to annual income  -he is going to send me a copy of his 1040 form   -once received, I will submit for possible re-enrollment     Follow Up: as needed     Continue all meds under the continuation of care with the referring provider and clinical pharmacy team.    Zechariah Williamson, Meghan     Verbal consent to manage patient's drug therapy was obtained from the patient. They were informed they may decline to participate or withdraw from participation in pharmacy services at any time.        [1]   Allergies  Allergen Reactions    Epogen [Epoetin Ronny] Rash    Penicillins Anaphylaxis and Hives

## 2025-04-18 ENCOUNTER — PHARMACY VISIT (OUTPATIENT)
Dept: PHARMACY | Facility: CLINIC | Age: 75
End: 2025-04-18
Payer: MEDICARE

## 2025-06-16 ENCOUNTER — TELEPHONE (OUTPATIENT)
Dept: PRIMARY CARE | Facility: CLINIC | Age: 75
End: 2025-06-16
Payer: MEDICARE

## 2025-06-16 DIAGNOSIS — N18.6 END STAGE RENAL DISEASE (MULTI): ICD-10-CM

## 2025-06-16 DIAGNOSIS — E78.5 DYSLIPIDEMIA, GOAL LDL BELOW 70: ICD-10-CM

## 2025-06-16 DIAGNOSIS — I70.0 ATHEROSCLEROSIS OF AORTA: ICD-10-CM

## 2025-06-16 DIAGNOSIS — E55.9 VITAMIN D DEFICIENCY: ICD-10-CM

## 2025-06-16 DIAGNOSIS — J44.9 COPD, SEVERE (MULTI): ICD-10-CM

## 2025-06-16 DIAGNOSIS — D51.3 OTHER DIETARY VITAMIN B12 DEFICIENCY ANEMIA: ICD-10-CM

## 2025-06-16 DIAGNOSIS — E05.00 GOITER WITH HYPERTHYROIDISM: ICD-10-CM

## 2025-06-16 RX ORDER — FAMOTIDINE 40 MG/1
40 TABLET, FILM COATED ORAL 2 TIMES DAILY
Qty: 180 TABLET | Refills: 0 | Status: SHIPPED | OUTPATIENT
Start: 2025-06-16

## 2025-06-16 NOTE — TELEPHONE ENCOUNTER
Requested Prescriptions     Pending Prescriptions Disp Refills    famotidine (Pepcid) 40 mg tablet 180 tablet 0     Sig: Take 1 tablet (40 mg) by mouth 2 times a day.     Lov 1/22/25    Nov 7/23/25

## 2025-07-13 PROCEDURE — RXMED WILLOW AMBULATORY MEDICATION CHARGE

## 2025-07-16 ENCOUNTER — PHARMACY VISIT (OUTPATIENT)
Dept: PHARMACY | Facility: CLINIC | Age: 75
End: 2025-07-16
Payer: MEDICARE

## 2025-07-16 PROBLEM — R06.00 DYSPNEA: Status: ACTIVE | Noted: 2020-05-27

## 2025-07-16 PROBLEM — J30.9 ALLERGIC RHINITIS: Status: ACTIVE | Noted: 2020-05-27

## 2025-07-16 PROBLEM — E04.0 SIMPLE GOITER: Status: ACTIVE | Noted: 2020-05-27

## 2025-07-16 PROBLEM — N18.9 CHRONIC KIDNEY DISEASE: Status: ACTIVE | Noted: 2020-05-27

## 2025-07-16 PROBLEM — E78.00 PURE HYPERCHOLESTEROLEMIA: Status: ACTIVE | Noted: 2020-05-27

## 2025-07-16 PROBLEM — K21.9 GASTROESOPHAGEAL REFLUX DISEASE WITHOUT ESOPHAGITIS: Status: ACTIVE | Noted: 2020-05-27

## 2025-07-16 PROBLEM — K59.00 CONSTIPATION: Status: ACTIVE | Noted: 2020-05-27

## 2025-07-16 PROBLEM — R53.1 ASTHENIA: Status: ACTIVE | Noted: 2020-05-27

## 2025-07-16 PROBLEM — E05.90 THYROTOXICOSIS: Status: ACTIVE | Noted: 2020-05-27

## 2025-07-16 PROBLEM — D64.9 ANEMIA: Status: ACTIVE | Noted: 2020-05-27

## 2025-07-16 RX ORDER — TORSEMIDE 100 MG/1
1 TABLET ORAL
COMMUNITY
Start: 2025-05-23

## 2025-07-23 ENCOUNTER — APPOINTMENT (OUTPATIENT)
Dept: PRIMARY CARE | Facility: CLINIC | Age: 75
End: 2025-07-23
Payer: MEDICARE

## 2025-07-23 VITALS
HEART RATE: 107 BPM | OXYGEN SATURATION: 92 % | BODY MASS INDEX: 25.41 KG/M2 | WEIGHT: 198 LBS | HEIGHT: 74 IN | SYSTOLIC BLOOD PRESSURE: 140 MMHG | DIASTOLIC BLOOD PRESSURE: 64 MMHG

## 2025-07-23 DIAGNOSIS — J30.2 PERENNIAL ALLERGIC RHINITIS WITH SEASONAL VARIATION: ICD-10-CM

## 2025-07-23 DIAGNOSIS — E78.5 DYSLIPIDEMIA, GOAL LDL BELOW 70: ICD-10-CM

## 2025-07-23 DIAGNOSIS — R73.02 IGT (IMPAIRED GLUCOSE TOLERANCE): ICD-10-CM

## 2025-07-23 DIAGNOSIS — I11.0 HYPERTENSIVE HEART DISEASE WITH CHRONIC DIASTOLIC CONGESTIVE HEART FAILURE: ICD-10-CM

## 2025-07-23 DIAGNOSIS — E05.00 GOITER WITH HYPERTHYROIDISM: ICD-10-CM

## 2025-07-23 DIAGNOSIS — N18.6 ESRD (END STAGE RENAL DISEASE) ON DIALYSIS (MULTI): ICD-10-CM

## 2025-07-23 DIAGNOSIS — N18.5 ANEMIA OF CHRONIC RENAL FAILURE, STAGE 5: ICD-10-CM

## 2025-07-23 DIAGNOSIS — J30.89 PERENNIAL ALLERGIC RHINITIS WITH SEASONAL VARIATION: ICD-10-CM

## 2025-07-23 DIAGNOSIS — Z99.2 ESRD (END STAGE RENAL DISEASE) ON DIALYSIS (MULTI): ICD-10-CM

## 2025-07-23 DIAGNOSIS — J43.2 CENTRILOBULAR EMPHYSEMA (MULTI): Primary | ICD-10-CM

## 2025-07-23 DIAGNOSIS — N25.81 SECONDARY HYPERPARATHYROIDISM OF RENAL ORIGIN (MULTI): ICD-10-CM

## 2025-07-23 DIAGNOSIS — D63.1 ANEMIA OF CHRONIC RENAL FAILURE, STAGE 5: ICD-10-CM

## 2025-07-23 DIAGNOSIS — I50.32 HYPERTENSIVE HEART DISEASE WITH CHRONIC DIASTOLIC CONGESTIVE HEART FAILURE: ICD-10-CM

## 2025-07-23 PROBLEM — L85.3 XEROSIS OF SKIN: Status: RESOLVED | Noted: 2023-04-17 | Resolved: 2025-07-23

## 2025-07-23 PROBLEM — E05.90 THYROTOXICOSIS: Status: RESOLVED | Noted: 2020-05-27 | Resolved: 2025-07-23

## 2025-07-23 PROBLEM — R53.1 ASTHENIA: Status: RESOLVED | Noted: 2020-05-27 | Resolved: 2025-07-23

## 2025-07-23 PROBLEM — E78.00 PURE HYPERCHOLESTEROLEMIA: Status: RESOLVED | Noted: 2020-05-27 | Resolved: 2025-07-23

## 2025-07-23 PROBLEM — J96.21 ACUTE ON CHRONIC RESPIRATORY FAILURE WITH HYPOXEMIA: Status: RESOLVED | Noted: 2020-05-27 | Resolved: 2025-07-23

## 2025-07-23 PROBLEM — I10 ESSENTIAL HYPERTENSION: Status: RESOLVED | Noted: 2018-11-19 | Resolved: 2025-07-23

## 2025-07-23 PROBLEM — R06.00 DYSPNEA: Status: RESOLVED | Noted: 2020-05-27 | Resolved: 2025-07-23

## 2025-07-23 PROBLEM — N18.9 CHRONIC KIDNEY DISEASE: Status: RESOLVED | Noted: 2020-05-27 | Resolved: 2025-07-23

## 2025-07-23 PROBLEM — D64.9 ANEMIA: Status: RESOLVED | Noted: 2020-05-27 | Resolved: 2025-07-23

## 2025-07-23 PROBLEM — E04.0 SIMPLE GOITER: Status: RESOLVED | Noted: 2020-05-27 | Resolved: 2025-07-23

## 2025-07-23 PROBLEM — L29.89 UREMIC PRURITUS: Status: RESOLVED | Noted: 2023-04-17 | Resolved: 2025-07-23

## 2025-07-23 PROBLEM — K59.00 CONSTIPATION: Status: RESOLVED | Noted: 2020-05-27 | Resolved: 2025-07-23

## 2025-07-23 PROBLEM — J30.9 ALLERGIC RHINITIS: Status: RESOLVED | Noted: 2020-05-27 | Resolved: 2025-07-23

## 2025-07-23 LAB — HCV AB SERPL QL IA: NORMAL

## 2025-07-23 PROCEDURE — 1159F MED LIST DOCD IN RCRD: CPT | Performed by: INTERNAL MEDICINE

## 2025-07-23 PROCEDURE — 1160F RVW MEDS BY RX/DR IN RCRD: CPT | Performed by: INTERNAL MEDICINE

## 2025-07-23 PROCEDURE — 3078F DIAST BP <80 MM HG: CPT | Performed by: INTERNAL MEDICINE

## 2025-07-23 PROCEDURE — 3077F SYST BP >= 140 MM HG: CPT | Performed by: INTERNAL MEDICINE

## 2025-07-23 PROCEDURE — G2211 COMPLEX E/M VISIT ADD ON: HCPCS | Performed by: INTERNAL MEDICINE

## 2025-07-23 PROCEDURE — 99214 OFFICE O/P EST MOD 30 MIN: CPT | Performed by: INTERNAL MEDICINE

## 2025-07-23 NOTE — PROGRESS NOTES
"Subjective   Reason for Visit: Renard Ward is an 75 y.o. male here for a follow-up visit.     Past Medical, Surgical, and Family History reviewed and updated in chart.    Reviewed all medications by prescribing practitioner or clinical pharmacist (such as prescriptions, OTCs, herbal therapies and supplements) and documented in the medical record.    HPI    Patient Care Team:  Leandro Bassett, DO as PCP - General     Review of Systems   All other systems reviewed and are negative.      Objective   Vitals:  /64   Pulse 107   Ht 1.88 m (6' 2\")   Wt 89.8 kg (198 lb)   SpO2 92%   BMI 25.42 kg/m²       Physical Exam  Vitals and nursing note reviewed.   Constitutional:       General: He is not in acute distress.     Appearance: Normal appearance. He is well-developed. He is obese. He is not toxic-appearing.   HENT:      Head: Normocephalic and atraumatic.      Right Ear: Tympanic membrane and external ear normal.      Left Ear: Tympanic membrane and external ear normal.      Nose: Nose normal.      Mouth/Throat:      Mouth: Mucous membranes are moist.      Pharynx: Oropharynx is clear. No oropharyngeal exudate or posterior oropharyngeal erythema.      Tonsils: No tonsillar exudate. 2+ on the right. 2+ on the left.     Eyes:      Extraocular Movements: Extraocular movements intact.      Conjunctiva/sclera: Conjunctivae normal.       Cardiovascular:      Rate and Rhythm: Normal rate and regular rhythm.      Pulses: Normal pulses.      Heart sounds: Normal heart sounds. No murmur heard.  Pulmonary:      Effort: Pulmonary effort is normal.      Breath sounds: Normal breath sounds.   Abdominal:      General: Abdomen is flat. Bowel sounds are normal.      Palpations: Abdomen is soft.     Musculoskeletal:      Cervical back: Neck supple.   Feet:      Right foot:      Skin integrity: Skin integrity normal. No ulcer, blister, skin breakdown, erythema, warmth or callus.      Toenail Condition: Right toenails are " normal.      Left foot:      Skin integrity: Skin integrity normal. No ulcer, blister, skin breakdown, erythema, warmth or callus.      Toenail Condition: Left toenails are normal.   Lymphadenopathy:      Cervical: No cervical adenopathy.     Skin:     General: Skin is warm and dry.      Capillary Refill: Capillary refill takes more than 3 seconds.      Findings: No rash.     Neurological:      Mental Status: He is alert. Mental status is at baseline.      Sensory: Sensation is intact.     Psychiatric:         Mood and Affect: Mood normal.         Behavior: Behavior normal.         Thought Content: Thought content normal.         Judgment: Judgment normal.     Lifestyle Recommendations  I recommend a whole-food plant-based diet, an eating pattern that encourages the consumption of unrefined plant foods (such as fruits, vegetables, tubers, whole grains, legumes, nuts and seeds) and discourages meats, dairy products, eggs and processed foods.     The AHA/ACC recommends that the patient consume a dietary pattern that emphasizes intake of vegetables, fruits, and whole grains; includes low-fat dairy products, poultry, fish, legumes, non-tropical vegetable oils, and nuts; and limits intake of sodium, sweets, sugar-sweetened beverages, and red meats.  Adapt this dietary pattern to appropriate calorie requirements (a 500-750 kcal/day deficit to loose weight), personal and cultural food preferences, and nutrition therapy for other medical conditions (including diabetes).  Achieve this pattern by following plans such as the [place] Mediterranean, DASH dietary pattern, or AHA diet.     Engage in 2 hours and 30 minutes per week of moderate-intensity physical activity, or 1 hour and 15 minutes (75 minutes) per week of vigorous-intensity aerobic physical activity, or an equivalent combination of moderate and vigorous-intensity aerobic physical activity. Aerobic activity should be performed in episodes of at least 10 minutes  preferably spread throughout the week.     Adhering to a heart healthy diet, regular exercise habits, avoidance of tobacco products, and maintenance of a healthy weight are crucial components of their heart disease risk reduction.  This may not meet the criteria for a clinical depression diagnosis. Symptoms were reviewed with Renard.  Follow-up within the next 3 months is recommended to re-assess symptoms and monitor mental health status.     Assessment & Plan  Perennial allergic rhinitis with seasonal variation  Stable continue montelukast  Orders:    Follow Up In [facility]    Follow Up In [facility] - PCP - Medicare Annual; Future    Centrilobular emphysema (Multi)  Stable on Trelegy Ellipta  Orders:    Follow Up In [facility] - PCP - Medicare Annual; Future    Hypertensive heart disease with chronic diastolic congestive heart failure  Well compensated on carvedilol 6.25 mg twice a day  Orders:    Follow Up In [facility] - PCP - Medicare Annual; Future    CBC and Auto Differential; Future    Comprehensive Metabolic Panel; Future    Hemoglobin A1C; Future    Lipid Panel; Future    Albumin-Creatinine Ratio, Urine Random; Future    Tsh With Reflex To Free T4 If Abnormal; Future    Iron and TIBC; Future    Ferritin; Future    PTH, intact; Future    Vitamin B12; Future    Vitamin D 25-Hydroxy,Total (for eval of Vitamin D levels); Future    Dyslipidemia, goal LDL below 70  Continue atorvastatin 40 mg daily  Orders:    Follow Up In [facility] - PCP - Medicare Annual; Future    CBC and Auto Differential; Future    Comprehensive Metabolic Panel; Future    Hemoglobin A1C; Future    Lipid Panel; Future    Albumin-Creatinine Ratio, Urine Random; Future    Tsh With Reflex To Free T4 If Abnormal; Future    Iron and TIBC; Future    Ferritin; Future    PTH, intact; Future    Vitamin B12; Future    Vitamin D 25-Hydroxy,Total (for eval of Vitamin D levels);  Future    ESRD (end stage renal disease) on dialysis (Multi)  Receiving home hemodialysis 4 days a week review outcomes from nephrologist  Orders:    Follow Up In [facility] - PCP - Medicare Annual; Future    CBC and Auto Differential; Future    Comprehensive Metabolic Panel; Future    Hemoglobin A1C; Future    Lipid Panel; Future    Albumin-Creatinine Ratio, Urine Random; Future    Tsh With Reflex To Free T4 If Abnormal; Future    Iron and TIBC; Future    Ferritin; Future    PTH, intact; Future    Vitamin B12; Future    Vitamin D 25-Hydroxy,Total (for eval of Vitamin D levels); Future    Goiter with hyperthyroidism  Stable on methimazole repeat blood work  Orders:    CBC and Auto Differential; Future    Comprehensive Metabolic Panel; Future    Hemoglobin A1C; Future    Lipid Panel; Future    Albumin-Creatinine Ratio, Urine Random; Future    Tsh With Reflex To Free T4 If Abnormal; Future    Iron and TIBC; Future    Ferritin; Future    PTH, intact; Future    Vitamin B12; Future    Vitamin D 25-Hydroxy,Total (for eval of Vitamin D levels); Future    Secondary hyperparathyroidism of renal origin (Multi)  Continue to monitor intact PTH vitamin D levels in the setting of end-stage renal disease  Orders:    CBC and Auto Differential; Future    Comprehensive Metabolic Panel; Future    Hemoglobin A1C; Future    Lipid Panel; Future    Albumin-Creatinine Ratio, Urine Random; Future    Tsh With Reflex To Free T4 If Abnormal; Future    Iron and TIBC; Future    Ferritin; Future    PTH, intact; Future    Vitamin B12; Future    Vitamin D 25-Hydroxy,Total (for eval of Vitamin D levels); Future    Anemia of chronic renal failure, stage 5  Hemoglobin level stable at 11 improved continue to monitor closely  Orders:    CBC and Auto Differential; Future    Comprehensive Metabolic Panel; Future    Hemoglobin A1C; Future    Lipid Panel; Future    Albumin-Creatinine Ratio, Urine Random; Future    Tsh With Reflex To Free T4 If  Abnormal; Future    Iron and TIBC; Future    Ferritin; Future    PTH, intact; Future    Vitamin B12; Future    Vitamin D 25-Hydroxy,Total (for eval of Vitamin D levels); Future    IGT (impaired glucose tolerance)    Orders:    Hemoglobin A1C; Future

## 2025-07-23 NOTE — ASSESSMENT & PLAN NOTE
Well compensated on carvedilol 6.25 mg twice a day  Orders:    Follow Up In [facility] - PCP - Medicare Annual; Future    CBC and Auto Differential; Future    Comprehensive Metabolic Panel; Future    Hemoglobin A1C; Future    Lipid Panel; Future    Albumin-Creatinine Ratio, Urine Random; Future    Tsh With Reflex To Free T4 If Abnormal; Future    Iron and TIBC; Future    Ferritin; Future    PTH, intact; Future    Vitamin B12; Future    Vitamin D 25-Hydroxy,Total (for eval of Vitamin D levels); Future

## 2025-07-23 NOTE — ASSESSMENT & PLAN NOTE
Stable continue montelukast  Orders:    Follow Up In [facility] - PCP - Established    Follow Up In [facility] - PCP - Medicare Annual; Future

## 2025-07-23 NOTE — ASSESSMENT & PLAN NOTE
Receiving home hemodialysis 4 days a week review outcomes from nephrologist  Orders:    Follow Up In [facility] - PCP - Medicare Annual; Future    CBC and Auto Differential; Future    Comprehensive Metabolic Panel; Future    Hemoglobin A1C; Future    Lipid Panel; Future    Albumin-Creatinine Ratio, Urine Random; Future    Tsh With Reflex To Free T4 If Abnormal; Future    Iron and TIBC; Future    Ferritin; Future    PTH, intact; Future    Vitamin B12; Future    Vitamin D 25-Hydroxy,Total (for eval of Vitamin D levels); Future

## 2025-07-23 NOTE — ASSESSMENT & PLAN NOTE
Continue to monitor intact PTH vitamin D levels in the setting of end-stage renal disease  Orders:    CBC and Auto Differential; Future    Comprehensive Metabolic Panel; Future    Hemoglobin A1C; Future    Lipid Panel; Future    Albumin-Creatinine Ratio, Urine Random; Future    Tsh With Reflex To Free T4 If Abnormal; Future    Iron and TIBC; Future    Ferritin; Future    PTH, intact; Future    Vitamin B12; Future    Vitamin D 25-Hydroxy,Total (for eval of Vitamin D levels); Future

## 2025-07-23 NOTE — ASSESSMENT & PLAN NOTE
Continue atorvastatin 40 mg daily  Orders:    Follow Up In [facility] - PCP - Medicare Annual; Future    CBC and Auto Differential; Future    Comprehensive Metabolic Panel; Future    Hemoglobin A1C; Future    Lipid Panel; Future    Albumin-Creatinine Ratio, Urine Random; Future    Tsh With Reflex To Free T4 If Abnormal; Future    Iron and TIBC; Future    Ferritin; Future    PTH, intact; Future    Vitamin B12; Future    Vitamin D 25-Hydroxy,Total (for eval of Vitamin D levels); Future

## 2025-07-23 NOTE — ASSESSMENT & PLAN NOTE
Hemoglobin level stable at 11 improved continue to monitor closely  Orders:    CBC and Auto Differential; Future    Comprehensive Metabolic Panel; Future    Hemoglobin A1C; Future    Lipid Panel; Future    Albumin-Creatinine Ratio, Urine Random; Future    Tsh With Reflex To Free T4 If Abnormal; Future    Iron and TIBC; Future    Ferritin; Future    PTH, intact; Future    Vitamin B12; Future    Vitamin D 25-Hydroxy,Total (for eval of Vitamin D levels); Future

## 2025-07-23 NOTE — PROGRESS NOTES
"Subjective   Patient ID: Renard Ward is a 75 y.o. male who presents for Follow-up.    HPI     Review of Systems    Objective   /64   Pulse 107   Ht 1.88 m (6' 2\")   Wt 89.8 kg (198 lb)   SpO2 92%   BMI 25.42 kg/m²     Physical Exam    Assessment/Plan          "

## 2025-07-23 NOTE — ASSESSMENT & PLAN NOTE
Stable on Trelegy Ellipta  Orders:    Follow Up In [facility] - PCP - Medicare Annual; Future     None

## 2025-07-23 NOTE — ASSESSMENT & PLAN NOTE
Stable on methimazole repeat blood work  Orders:    CBC and Auto Differential; Future    Comprehensive Metabolic Panel; Future    Hemoglobin A1C; Future    Lipid Panel; Future    Albumin-Creatinine Ratio, Urine Random; Future    Tsh With Reflex To Free T4 If Abnormal; Future    Iron and TIBC; Future    Ferritin; Future    PTH, intact; Future    Vitamin B12; Future    Vitamin D 25-Hydroxy,Total (for eval of Vitamin D levels); Future

## 2025-07-31 ENCOUNTER — HOSPITAL ENCOUNTER (OUTPATIENT)
Dept: CARDIOLOGY | Facility: HOSPITAL | Age: 75
Discharge: HOME | End: 2025-07-31
Payer: MEDICARE

## 2025-07-31 VITALS
HEART RATE: 72 BPM | TEMPERATURE: 97.9 F | SYSTOLIC BLOOD PRESSURE: 136 MMHG | DIASTOLIC BLOOD PRESSURE: 56 MMHG | RESPIRATION RATE: 16 BRPM | OXYGEN SATURATION: 100 %

## 2025-07-31 DIAGNOSIS — N18.6 END STAGE RENAL DISEASE (MULTI): ICD-10-CM

## 2025-07-31 PROCEDURE — 2500000004 HC RX 250 GENERAL PHARMACY W/ HCPCS (ALT 636 FOR OP/ED): Performed by: RADIOLOGY

## 2025-07-31 PROCEDURE — 2780000003 HC OR 278 NO HCPCS

## 2025-07-31 PROCEDURE — C1894 INTRO/SHEATH, NON-LASER: HCPCS

## 2025-07-31 PROCEDURE — C1769 GUIDE WIRE: HCPCS

## 2025-07-31 PROCEDURE — 36589 REMOVAL TUNNELED CV CATH: CPT | Performed by: RADIOLOGY

## 2025-07-31 PROCEDURE — 2720000007 HC OR 272 NO HCPCS

## 2025-07-31 PROCEDURE — 7100000009 HC PHASE TWO TIME - INITIAL BASE CHARGE

## 2025-07-31 PROCEDURE — 77001 FLUOROGUIDE FOR VEIN DEVICE: CPT | Performed by: RADIOLOGY

## 2025-07-31 PROCEDURE — 36581 REPLACE TUNNELED CV CATH: CPT | Performed by: RADIOLOGY

## 2025-07-31 PROCEDURE — 7100000010 HC PHASE TWO TIME - EACH INCREMENTAL 1 MINUTE

## 2025-07-31 PROCEDURE — 99153 MOD SED SAME PHYS/QHP EA: CPT

## 2025-07-31 PROCEDURE — 99152 MOD SED SAME PHYS/QHP 5/>YRS: CPT

## 2025-07-31 PROCEDURE — C1750 CATH, HEMODIALYSIS,LONG-TERM: HCPCS

## 2025-07-31 RX ORDER — LIDOCAINE HYDROCHLORIDE 20 MG/ML
INJECTION, SOLUTION EPIDURAL; INFILTRATION; INTRACAUDAL; PERINEURAL
Status: COMPLETED | OUTPATIENT
Start: 2025-07-31 | End: 2025-07-31

## 2025-07-31 RX ORDER — ONDANSETRON HYDROCHLORIDE 2 MG/ML
4 INJECTION, SOLUTION INTRAVENOUS EVERY 6 HOURS PRN
Status: DISCONTINUED | OUTPATIENT
Start: 2025-07-31 | End: 2025-08-01 | Stop reason: HOSPADM

## 2025-07-31 RX ORDER — MIDAZOLAM HYDROCHLORIDE 1 MG/ML
INJECTION, SOLUTION INTRAMUSCULAR; INTRAVENOUS
Status: COMPLETED | OUTPATIENT
Start: 2025-07-31 | End: 2025-07-31

## 2025-07-31 RX ORDER — NALOXONE HYDROCHLORIDE 0.4 MG/ML
0.2 INJECTION, SOLUTION INTRAMUSCULAR; INTRAVENOUS; SUBCUTANEOUS EVERY 5 MIN PRN
Status: DISCONTINUED | OUTPATIENT
Start: 2025-07-31 | End: 2025-08-01 | Stop reason: HOSPADM

## 2025-07-31 RX ORDER — HEPARIN 100 UNIT/ML
500 SYRINGE INTRAVENOUS ONCE AS NEEDED
Status: DISCONTINUED | OUTPATIENT
Start: 2025-07-31 | End: 2025-08-01 | Stop reason: HOSPADM

## 2025-07-31 RX ORDER — FENTANYL CITRATE 50 UG/ML
INJECTION, SOLUTION INTRAMUSCULAR; INTRAVENOUS
Status: COMPLETED | OUTPATIENT
Start: 2025-07-31 | End: 2025-07-31

## 2025-07-31 RX ORDER — FLUMAZENIL 0.1 MG/ML
0.2 INJECTION INTRAVENOUS ONCE AS NEEDED
Status: DISCONTINUED | OUTPATIENT
Start: 2025-07-31 | End: 2025-08-01 | Stop reason: HOSPADM

## 2025-07-31 RX ADMIN — LIDOCAINE HYDROCHLORIDE 5 ML: 20 INJECTION, SOLUTION EPIDURAL; INFILTRATION; INTRACAUDAL; PERINEURAL at 10:49

## 2025-07-31 RX ADMIN — LIDOCAINE HYDROCHLORIDE 5 ML: 20 INJECTION, SOLUTION EPIDURAL; INFILTRATION; INTRACAUDAL; PERINEURAL at 11:05

## 2025-07-31 RX ADMIN — MIDAZOLAM 1 MG: 1 INJECTION INTRAMUSCULAR; INTRAVENOUS at 10:48

## 2025-07-31 RX ADMIN — FENTANYL CITRATE 100 MCG: 50 INJECTION INTRAMUSCULAR; INTRAVENOUS at 10:48

## 2025-07-31 ASSESSMENT — PAIN SCALES - GENERAL

## 2025-07-31 ASSESSMENT — PAIN - FUNCTIONAL ASSESSMENT
PAIN_FUNCTIONAL_ASSESSMENT: 0-10

## 2025-07-31 NOTE — NURSING NOTE
Final access site assessment WDL and dressing clean, dry, and intact. IV removed and dressing applied.     Homegoing instructions specific to procedure given, patient verbalized understanding.  Discharge criteria met: patient easily arousable, responding appropriately. Vital signs +/- 20% of preprocedure baseline. Significant complications absent. Ambulates without dizziness. Pulse ox > 92% on room air or baseline O2.     Patient discharged to home, accompanied by family. Discharged via wheelchair.

## 2025-07-31 NOTE — Clinical Note
The site was marked. Prepped: right chest and right neck. Prepped with: ChloraPrep. The site was clipped. The patient was draped.

## 2025-07-31 NOTE — POST-PROCEDURE NOTE
Interventional Radiology Brief Postprocedure Note    Attending: Hussein Sands MD      Assistant: none    Diagnosis: esrd    Description of procedure: tdc placement     Anesthesia:  Local, mod sed    Complications: None    Estimated Blood Loss: minimal      See detailed result report with images in PACS.    The patient tolerated the procedure well without incident or complication.

## 2025-08-05 ENCOUNTER — HOSPITAL ENCOUNTER (EMERGENCY)
Facility: HOSPITAL | Age: 75
Discharge: HOME | End: 2025-08-05
Payer: MEDICARE

## 2025-08-05 VITALS
TEMPERATURE: 98.4 F | SYSTOLIC BLOOD PRESSURE: 154 MMHG | DIASTOLIC BLOOD PRESSURE: 63 MMHG | RESPIRATION RATE: 14 BRPM | OXYGEN SATURATION: 94 % | HEART RATE: 93 BPM | BODY MASS INDEX: 28.88 KG/M2 | HEIGHT: 69 IN | WEIGHT: 195 LBS

## 2025-08-05 DIAGNOSIS — T82.838A BLEEDING DUE TO DIALYSIS CATHETER PLACEMENT, INITIAL ENCOUNTER: Primary | ICD-10-CM

## 2025-08-05 PROCEDURE — 99282 EMERGENCY DEPT VISIT SF MDM: CPT

## 2025-08-05 PROCEDURE — 99284 EMERGENCY DEPT VISIT MOD MDM: CPT

## 2025-08-05 PROCEDURE — 99283 EMERGENCY DEPT VISIT LOW MDM: CPT

## 2025-08-05 PROCEDURE — 99281 EMR DPT VST MAYX REQ PHY/QHP: CPT

## 2025-08-05 ASSESSMENT — LIFESTYLE VARIABLES
TOTAL SCORE: 0
HAVE PEOPLE ANNOYED YOU BY CRITICIZING YOUR DRINKING: NO
EVER HAD A DRINK FIRST THING IN THE MORNING TO STEADY YOUR NERVES TO GET RID OF A HANGOVER: NO
HAVE YOU EVER FELT YOU SHOULD CUT DOWN ON YOUR DRINKING: NO
EVER FELT BAD OR GUILTY ABOUT YOUR DRINKING: NO

## 2025-08-05 ASSESSMENT — PAIN SCALES - GENERAL
PAINLEVEL_OUTOF10: 0 - NO PAIN

## 2025-08-05 ASSESSMENT — PAIN - FUNCTIONAL ASSESSMENT
PAIN_FUNCTIONAL_ASSESSMENT: 0-10
PAIN_FUNCTIONAL_ASSESSMENT: 0-10

## 2025-08-05 ASSESSMENT — PAIN DESCRIPTION - PAIN TYPE: TYPE: ACUTE PAIN

## 2025-08-05 NOTE — ED PROVIDER NOTES
"    HPI   Chief Complaint   Patient presents with    dialysis cath insertion site bleeding       This is a 75-year-old  male, with a past medical history of hypertension, CAD, CHF, hyperlipidemia, end-stage renal disease on dialysis, hypothyroidism with goiter, hyperparathyroidism, phonic renal failure anemia, IGT and emphysema with home O2, that is presenting to the emergency room with complaints of persistent bleeding from dialysis insertion site.  The patient had his dialysis catheter changed from the right chest wall to the left chest wall on 7/31 by Dr. Sands.  Patient has been experiencing persistent bleeding from the insertion site.  He called the cardiology to let them know.  He is not having any shortness of breath, dizziness, palpitations, paresthesias, focal weakness.  Reports that the area is tender to palpation.  They have been using the catheters for dialysis.  He is not having any fevers or cold-like symptoms.  Patient is on oral anticoagulation.  He held his daily dose today.      History provided by:  Patient   used: No                          Mayra Coma Scale Score: 15                  Patient History   Medical History[1]  Surgical History[2]  Family History[3]  Social History[4]    Physical Exam   Visit Vitals  /63   Pulse 93   Temp 36.9 °C (98.4 °F)   Resp 14   Ht 1.753 m (5' 9\")   Wt 88.5 kg (195 lb)   SpO2 94%   BMI 28.80 kg/m²   Smoking Status Former   BSA 2.08 m²      Physical Exam  Vitals and nursing note reviewed.   HENT:      Head: Normocephalic.      Right Ear: External ear normal.      Left Ear: External ear normal.      Nose: Nose normal.      Mouth/Throat:      Pharynx: Oropharynx is clear.     Eyes:      Conjunctiva/sclera: Conjunctivae normal.       Cardiovascular:      Rate and Rhythm: Normal rate and regular rhythm.      Pulses: Normal pulses.      Heart sounds: Normal heart sounds.   Pulmonary:      Effort: Pulmonary effort is normal.      " Breath sounds: Normal breath sounds.   Chest:      Comments: The patient has a left chest wall dialysis catheter in place.  There is a clot surrounding the shaft of the catheter.  No active bleeding currently.  There is some ecchymosis noted to the surrounding chest wall.  No crepitus appreciated.  Abdominal:      Palpations: Abdomen is soft.     Neurological:      Mental Status: He is alert.         No orders to display       Labs Reviewed   CBC WITH AUTO DIFFERENTIAL         ED Course & MDM   Diagnoses as of 08/05/25 1342   Bleeding due to dialysis catheter placement, initial encounter           Medical Decision Making  The patient was seen and evaluated by the nurse practitioner, Wendy Aldrich.  The patient is presenting to the emergency room for a bleeding dialysis catheter to the left chest wall.  There is no gross hemorrhage appreciated.  Nery, the cardiac NP was at bedside for evaluation.  The catheter was cleaned and a clotting dressing was applied around the base of the catheter.  The catheter was redressed.  We wanted to obtain blood work to assess for anemia.  The patient is refusing blood work.  He is refusing monitoring.  The patient is not having any obvious bleeding at this time.  The patient was discharged in stable condition with computer discharge instructions given.  He is to follow-up with cardiology/Dr. Sands.  He was discharged in stable condition with computer discharge instructions.           Your medication list        ASK your doctor about these medications        Instructions Last Dose Given Next Dose Due   albuterol 90 mcg/actuation inhaler      Inhale 2 puffs every 6 hours if needed for wheezing or shortness of breath.       atorvastatin 40 mg tablet  Commonly known as: Lipitor      Take 1 tablet (40 mg) by mouth once daily at bedtime.       carvedilol 6.25 mg tablet  Commonly known as: Coreg      Take 1 tablet (6.25 mg) by mouth 2 times daily (morning and late afternoon).       Eliquis  5 mg tablet  Generic drug: apixaban      Take 1 tablet (5 mg) by mouth 2 times a day.       ergocalciferol 1250 mcg (50,000 units) capsule  Commonly known as: Vitamin D-2           famotidine 40 mg tablet  Commonly known as: Pepcid      Take 1 tablet (40 mg) by mouth 2 times a day.       methIMAzole 5 mg tablet  Commonly known as: Tapazole      Take 1 tablet (5 mg) by mouth once daily.       montelukast 10 mg tablet  Commonly known as: Singulair      Take 1 tablet (10 mg) by mouth once daily at bedtime.       oxygen gas therapy  Commonly known as: O2           Mary Grace-Darryl Rx 1- mg-mg-mcg tablet  Generic drug: B complex-vitamin C-folic acid           sevelamer carbonate 800 mg tablet  Commonly known as: Renvela           torsemide 100 mg tablet  Commonly known as: Demadex           Trelegy Ellipta 100-62.5-25 mcg blister with device  Generic drug: fluticasone-umeclidin-vilanter      Inhale 1 puff once daily.       Vitamin B-12 1,000 mcg tablet extended release  Generic drug: cyanocobalamin (vitamin B-12)      TAKE ONE TABLET BY MOUTH ONCE DAILY       Wescaps 1 mg capsule  Generic drug: vitamin B complex-vitamin C-folic acid                    Procedure       *This report was transcribed using voice recognition software.  Every effort was made to ensure accuracy; however, inadvertent computerized transcription errors may be present.*  Wendy CANTU-CNP  08/05/25           ALONDRA Tucker-CNP  08/05/25 1341       [1]   Past Medical History:  Diagnosis Date    Abnormal level of blood mineral 02/22/2021    Abnormal level of blood mineral    Acute diastolic (congestive) heart failure 06/18/2020    Acute diastolic congestive heart failure    Acute kidney failure, unspecified 06/16/2020    ALMAS (acute kidney injury)    Acute respiratory failure with hypoxia 03/11/2020    Acute respiratory failure with hypoxia    Cellulitis of left toe 08/20/2020    Cellulitis of great toe of left foot    Chronic obstructive  pulmonary disease, unspecified 09/21/2022    COPD, severe    Chronic respiratory failure with hypoxia 01/20/2023    Cor pulmonale (chronic) 01/03/2022    Cor pulmonale, chronic    Encounter for screening for malignant neoplasm of colon 10/19/2022    Colon cancer screening    Infective pericarditis ([facility]) 06/16/2020    Acute viral pericarditis    Iron deficiency anemia secondary to blood loss (chronic) 11/19/2020    Iron deficiency anemia due to chronic blood loss    Nicotine dependence, unspecified, uncomplicated 01/14/2020    Needs smoking cessation education    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    Nontoxic multinodular goiter    Obstructive sleep apnea (adult) (pediatric) 03/11/2020    LEO and COPD overlap syndrome    Other allergic rhinitis 02/23/2021    Other allergic rhinitis    Other fecal abnormalities 07/30/2021    Positive occult stool blood test    Other hypersomnia 01/23/2020    Excessive daytime sleepiness    Other hypersomnia 01/23/2020    Daytime hypersomnolence    Other ill-defined heart diseases 04/27/2020    Grade I diastolic dysfunction    Other pericardial effusion (noninflammatory) ([facility]) 06/18/2020    Pericardial effusion without cardiac tamponade    Other specified personal risk factors, not elsewhere classified 07/30/2020    At risk for amiodarone toxicity with long term use    Other specified symptoms and signs involving the circulatory and respiratory systems 10/10/2019    Bruit of left carotid artery    Personal history of diseases of the skin and subcutaneous tissue 11/19/2020    History of psoriasis    Personal history of nicotine dependence 08/18/2021    History of tobacco use disorder    Personal history of other diseases of the digestive system     History of esophageal reflux    Personal history of other diseases of the nervous system and sense organs     History of obstructive sleep apnea    Personal history of other endocrine, nutritional and metabolic disease 11/19/2020     History of vitamin D deficiency    Personal history of other endocrine, nutritional and metabolic disease 10/14/2020    History of hyperlipidemia    Personal history of other endocrine, nutritional and metabolic disease 08/20/2020    History of hyperthyroidism    Personal history of other infectious and parasitic diseases 07/06/2021    History of Clostridioides difficile colitis    Personal history of other infectious and parasitic diseases 02/04/2021    History of scabies    Personal history of other specified conditions 04/12/2016    History of snoring    Personal history of other specified conditions 04/07/2016    History of dizziness    Personal history of transient ischemic attack (TIA), and cerebral infarction without residual deficits     History of stroke    Pleural effusion, not elsewhere classified 07/30/2020    Bilateral pleural effusion    Pleural effusion, not elsewhere classified 03/11/2020    Pleural effusion, left    Pneumonia due to Streptococcus pneumoniae 03/11/2020    Pneumonia of both lower lobes due to Streptococcus pneumoniae    Post-traumatic stress disorder, unspecified     PTSD (post-traumatic stress disorder)    Rash and other nonspecific skin eruption 03/25/2021    Rash    Secondary polycythemia 01/23/2020    Polycythemia secondary to smoking    Tinea unguium 08/25/2020    Onychomycosis of toenail    Urinary catheter complication 06/20/2023   [2]   Past Surgical History:  Procedure Laterality Date    APPENDECTOMY  04/07/2016    Appendectomy    CARDIAC CATHETERIZATION N/A 4/26/2024    Procedure: Left Heart Cath;  Surgeon: Perez Hernandez MD;  Location: [facility];  Service: Cardiovascular;  Laterality: N/A;    IR CVC TUNNELED  6/28/2023    IR CVC TUNNELED 6/28/2023 [facility]   [3]   Family History  Family history unknown: Yes   [4]   Social History  Tobacco Use    Smoking status: Former     Types: Cigarettes    Smokeless tobacco: Never   Vaping Use    Vaping status: Never  Used   Substance Use Topics    Alcohol use: Not Currently    Drug use: Never        ALONDRA Tucker-CNP  08/05/25 8615

## 2025-08-05 NOTE — SIGNIFICANT EVENT
Renard Ward is a 75 year old male that presents to the ER with constant oozing from left chest tunneled HD catheter placed on 7/31 by Dr. Sands in IR. The patient explained that after he changed his dressing 3 days after placement and that is when the catheter started oozing. Upon assessment, there is a slow trickle of blood coming from the posterior section of the catheter. I prepped the left sided HD dialysis catheter in sterile fashion. I held pressure at the left internal jugular and around the left chest. No further oozing noted. I packed hemostasis dressing around the catheter and covered with sterile gauze. I secured the dressing with Tegaderm. I updated NP Wendy. If patient has no further oozing will discharge home. The dressing should stayin place for 2-3 days. If the oozing would reoccur patient will need to report to IR for evaluation and treatment by Dr. Hussein Sands. He may require the placement of retention suture.

## 2025-08-18 ENCOUNTER — TELEPHONE (OUTPATIENT)
Dept: CARDIOLOGY | Facility: HOSPITAL | Age: 75
End: 2025-08-18
Payer: MEDICARE

## 2025-08-22 ENCOUNTER — RESULTS FOLLOW-UP (OUTPATIENT)
Dept: PRIMARY CARE | Facility: CLINIC | Age: 75
End: 2025-08-22
Payer: MEDICARE

## 2025-08-22 LAB
25(OH)D3+25(OH)D2 SERPL-MCNC: 47 NG/ML (ref 30–100)
ALBUMIN SERPL-MCNC: 4.2 G/DL (ref 3.6–5.1)
ALP SERPL-CCNC: 68 U/L (ref 35–144)
ALT SERPL-CCNC: 15 U/L (ref 9–46)
ANION GAP SERPL CALCULATED.4IONS-SCNC: 10 MMOL/L (CALC) (ref 7–17)
AST SERPL-CCNC: 13 U/L (ref 10–35)
BASOPHILS # BLD AUTO: 70 CELLS/UL (ref 0–200)
BASOPHILS NFR BLD AUTO: 0.9 %
BILIRUB SERPL-MCNC: 0.4 MG/DL (ref 0.2–1.2)
BUN SERPL-MCNC: 41 MG/DL (ref 7–25)
CALCIUM SERPL-MCNC: 8.2 MG/DL (ref 8.6–10.3)
CHLORIDE SERPL-SCNC: 97 MMOL/L (ref 98–110)
CHOLEST SERPL-MCNC: 112 MG/DL
CHOLEST/HDLC SERPL: 2.4 (CALC)
CO2 SERPL-SCNC: 30 MMOL/L (ref 20–32)
CREAT SERPL-MCNC: 7.69 MG/DL (ref 0.7–1.28)
EGFRCR SERPLBLD CKD-EPI 2021: 7 ML/MIN/1.73M2
EOSINOPHIL # BLD AUTO: 1084 CELLS/UL (ref 15–500)
EOSINOPHIL NFR BLD AUTO: 13.9 %
ERYTHROCYTE [DISTWIDTH] IN BLOOD BY AUTOMATED COUNT: 14.4 % (ref 11–15)
EST. AVERAGE GLUCOSE BLD GHB EST-MCNC: 103 MG/DL
EST. AVERAGE GLUCOSE BLD GHB EST-SCNC: 5.7 MMOL/L
FERRITIN SERPL-MCNC: 656 NG/ML (ref 24–380)
GLUCOSE SERPL-MCNC: 82 MG/DL (ref 65–99)
HBA1C MFR BLD: 5.2 %
HCT VFR BLD AUTO: 33 % (ref 38.5–50)
HDLC SERPL-MCNC: 47 MG/DL
HGB BLD-MCNC: 10.7 G/DL (ref 13.2–17.1)
IRON SATN MFR SERPL: 31 % (CALC) (ref 20–48)
IRON SERPL-MCNC: 63 MCG/DL (ref 50–180)
LDLC SERPL CALC-MCNC: 48 MG/DL (CALC)
LYMPHOCYTES # BLD AUTO: 1045 CELLS/UL (ref 850–3900)
LYMPHOCYTES NFR BLD AUTO: 13.4 %
MCH RBC QN AUTO: 31.4 PG (ref 27–33)
MCHC RBC AUTO-ENTMCNC: 32.4 G/DL (ref 32–36)
MCV RBC AUTO: 96.8 FL (ref 80–100)
MONOCYTES # BLD AUTO: 780 CELLS/UL (ref 200–950)
MONOCYTES NFR BLD AUTO: 10 %
NEUTROPHILS # BLD AUTO: 4820 CELLS/UL (ref 1500–7800)
NEUTROPHILS NFR BLD AUTO: 61.8 %
NONHDLC SERPL-MCNC: 65 MG/DL (CALC)
PLATELET # BLD AUTO: 203 THOUSAND/UL (ref 140–400)
PMV BLD REES-ECKER: 10.7 FL (ref 7.5–12.5)
POTASSIUM SERPL-SCNC: 5.3 MMOL/L (ref 3.5–5.3)
PROT SERPL-MCNC: 6.3 G/DL (ref 6.1–8.1)
PTH-INTACT SERPL-MCNC: 470 PG/ML (ref 16–77)
RBC # BLD AUTO: 3.41 MILLION/UL (ref 4.2–5.8)
SODIUM SERPL-SCNC: 137 MMOL/L (ref 135–146)
TIBC SERPL-MCNC: 201 MCG/DL (CALC) (ref 250–425)
TRIGL SERPL-MCNC: 88 MG/DL
TSH SERPL-ACNC: 2.16 MIU/L (ref 0.4–4.5)
VIT B12 SERPL-MCNC: 1129 PG/ML (ref 200–1100)
WBC # BLD AUTO: 7.8 THOUSAND/UL (ref 3.8–10.8)

## 2025-08-27 ENCOUNTER — OFFICE VISIT (OUTPATIENT)
Dept: CARDIOLOGY | Facility: HOSPITAL | Age: 75
End: 2025-08-27
Payer: MEDICARE

## 2025-08-27 VITALS
HEART RATE: 84 BPM | DIASTOLIC BLOOD PRESSURE: 70 MMHG | WEIGHT: 200 LBS | SYSTOLIC BLOOD PRESSURE: 158 MMHG | HEIGHT: 69 IN | BODY MASS INDEX: 29.62 KG/M2

## 2025-08-27 DIAGNOSIS — I10 PRIMARY HYPERTENSION: ICD-10-CM

## 2025-08-27 DIAGNOSIS — I48.0 PAROXYSMAL A-FIB (MULTI): ICD-10-CM

## 2025-08-27 DIAGNOSIS — E78.5 DYSLIPIDEMIA, GOAL LDL BELOW 70: ICD-10-CM

## 2025-08-27 DIAGNOSIS — I27.81 COR PULMONALE (CHRONIC): ICD-10-CM

## 2025-08-27 DIAGNOSIS — I65.22 STENOSIS OF LEFT EXTERNAL CAROTID ARTERY: ICD-10-CM

## 2025-08-27 DIAGNOSIS — I48.92 PAROXYSMAL ATRIAL FLUTTER (MULTI): ICD-10-CM

## 2025-08-27 DIAGNOSIS — E78.5 HYPERLIPIDEMIA, UNSPECIFIED HYPERLIPIDEMIA TYPE: ICD-10-CM

## 2025-08-27 DIAGNOSIS — I70.0 ATHEROSCLEROSIS OF AORTA: ICD-10-CM

## 2025-08-27 LAB
ATRIAL RATE: 84 BPM
P AXIS: 48 DEGREES
P OFFSET: 175 MS
P ONSET: 132 MS
PR INTERVAL: 172 MS
Q ONSET: 218 MS
QRS COUNT: 14 BEATS
QRS DURATION: 96 MS
QT INTERVAL: 372 MS
QTC CALCULATION(BAZETT): 439 MS
QTC FREDERICIA: 416 MS
R AXIS: 94 DEGREES
T AXIS: 24 DEGREES
T OFFSET: 404 MS
VENTRICULAR RATE: 84 BPM

## 2025-08-27 PROCEDURE — 1160F RVW MEDS BY RX/DR IN RCRD: CPT | Performed by: INTERNAL MEDICINE

## 2025-08-27 PROCEDURE — 3078F DIAST BP <80 MM HG: CPT | Performed by: INTERNAL MEDICINE

## 2025-08-27 PROCEDURE — 3077F SYST BP >= 140 MM HG: CPT | Performed by: INTERNAL MEDICINE

## 2025-08-27 PROCEDURE — 93005 ELECTROCARDIOGRAM TRACING: CPT | Performed by: INTERNAL MEDICINE

## 2025-08-27 PROCEDURE — 1159F MED LIST DOCD IN RCRD: CPT | Performed by: INTERNAL MEDICINE

## 2025-08-27 PROCEDURE — 99213 OFFICE O/P EST LOW 20 MIN: CPT

## 2025-08-27 PROCEDURE — 99214 OFFICE O/P EST MOD 30 MIN: CPT | Performed by: INTERNAL MEDICINE

## 2025-08-27 ASSESSMENT — ENCOUNTER SYMPTOMS
OCCASIONAL FEELINGS OF UNSTEADINESS: 0
DEPRESSION: 0
LOSS OF SENSATION IN FEET: 0

## 2026-01-21 ENCOUNTER — APPOINTMENT (OUTPATIENT)
Dept: PRIMARY CARE | Facility: CLINIC | Age: 76
End: 2026-01-21
Payer: MEDICARE

## (undated) DEVICE — CATHETER, DIAGNOSTIC, DXTERITY, 6FR 100CM, JL35

## (undated) DEVICE — SHEATH, GLIDESHEATH, SLENDER, 6FR 10CM

## (undated) DEVICE — CATHETER, OPTITORQUE, 6FR, JACKY, BL3.5/2H/100CM

## (undated) DEVICE — GUIDEWIRE, INQWIRE, 3MM J, .035 X 210CM, FIXED

## (undated) DEVICE — GUIDEWIRE, ANGLED, ZIPWIRE, 0.035 IN X 180 CM, 3 CM TIP

## (undated) DEVICE — TR BAND, RADIAL COMPRESSION, STANDARD, 24CM

## (undated) DEVICE — CATHETER, DIAGNOSTIC, DXTERITY, 5 FR JR 4.0, 100CM